# Patient Record
Sex: MALE | Race: WHITE | NOT HISPANIC OR LATINO | Employment: OTHER | URBAN - METROPOLITAN AREA
[De-identification: names, ages, dates, MRNs, and addresses within clinical notes are randomized per-mention and may not be internally consistent; named-entity substitution may affect disease eponyms.]

---

## 2017-04-28 PROBLEM — E11.3293 NON-PROLIFERATIVE DIABETIC RETINOPATHY, BOTH EYES: Status: ACTIVE | Noted: 2017-04-28

## 2017-06-07 ENCOUNTER — PATIENT OUTREACH (OUTPATIENT)
Dept: ADMINISTRATIVE | Facility: HOSPITAL | Age: 47
End: 2017-06-07

## 2017-06-12 ENCOUNTER — TELEPHONE (OUTPATIENT)
Dept: FAMILY MEDICINE | Facility: CLINIC | Age: 47
End: 2017-06-12

## 2017-06-12 DIAGNOSIS — E78.5 DYSLIPIDEMIA: ICD-10-CM

## 2017-06-12 DIAGNOSIS — I10 ESSENTIAL HYPERTENSION: Primary | ICD-10-CM

## 2017-06-12 DIAGNOSIS — Z12.5 ENCOUNTER FOR SCREENING FOR MALIGNANT NEOPLASM OF PROSTATE: ICD-10-CM

## 2017-06-12 DIAGNOSIS — E11.9 CONTROLLED TYPE 2 DIABETES MELLITUS WITHOUT COMPLICATION, WITHOUT LONG-TERM CURRENT USE OF INSULIN: ICD-10-CM

## 2017-06-12 NOTE — TELEPHONE ENCOUNTER
----- Message from Beth Noel sent at 6/12/2017  8:14 AM CDT -----  Contact: pt [phone]  Patient called and asked if you will put the labs to be done in the system.

## 2017-06-14 ENCOUNTER — LAB VISIT (OUTPATIENT)
Dept: LAB | Facility: HOSPITAL | Age: 47
End: 2017-06-14
Attending: INTERNAL MEDICINE
Payer: COMMERCIAL

## 2017-06-14 DIAGNOSIS — E78.5 DYSLIPIDEMIA: ICD-10-CM

## 2017-06-14 DIAGNOSIS — Z12.5 ENCOUNTER FOR SCREENING FOR MALIGNANT NEOPLASM OF PROSTATE: ICD-10-CM

## 2017-06-14 DIAGNOSIS — E11.9 CONTROLLED TYPE 2 DIABETES MELLITUS WITHOUT COMPLICATION, WITHOUT LONG-TERM CURRENT USE OF INSULIN: ICD-10-CM

## 2017-06-14 DIAGNOSIS — I10 ESSENTIAL HYPERTENSION: ICD-10-CM

## 2017-06-14 LAB
BASOPHILS # BLD AUTO: 0.03 K/UL
BASOPHILS NFR BLD: 0.4 %
CHOLEST/HDLC SERPL: 6.2 {RATIO}
COMPLEXED PSA SERPL-MCNC: 0.46 NG/ML
DIFFERENTIAL METHOD: ABNORMAL
EOSINOPHIL # BLD AUTO: 0.1 K/UL
EOSINOPHIL NFR BLD: 1.3 %
ERYTHROCYTE [DISTWIDTH] IN BLOOD BY AUTOMATED COUNT: 12.9 %
ESTIMATED AVG GLUCOSE: 315 MG/DL
HBA1C MFR BLD HPLC: 12.6 %
HCT VFR BLD AUTO: 51 %
HDL/CHOLESTEROL RATIO: 16 %
HDLC SERPL-MCNC: 231 MG/DL
HDLC SERPL-MCNC: 37 MG/DL
HGB BLD-MCNC: 17.7 G/DL
LDLC SERPL CALC-MCNC: 129 MG/DL
LYMPHOCYTES # BLD AUTO: 2.7 K/UL
LYMPHOCYTES NFR BLD: 32 %
MCH RBC QN AUTO: 29.4 PG
MCHC RBC AUTO-ENTMCNC: 34.7 %
MCV RBC AUTO: 85 FL
MONOCYTES # BLD AUTO: 0.6 K/UL
MONOCYTES NFR BLD: 7.3 %
NEUTROPHILS # BLD AUTO: 4.9 K/UL
NEUTROPHILS NFR BLD: 59 %
NONHDLC SERPL-MCNC: 194 MG/DL
PLATELET # BLD AUTO: 172 K/UL
PMV BLD AUTO: 13.3 FL
RBC # BLD AUTO: 6.03 M/UL
TRIGL SERPL-MCNC: 325 MG/DL
WBC # BLD AUTO: 8.34 K/UL

## 2017-06-14 PROCEDURE — 85025 COMPLETE CBC W/AUTO DIFF WBC: CPT

## 2017-06-14 PROCEDURE — 80061 LIPID PANEL: CPT

## 2017-06-14 PROCEDURE — 83036 HEMOGLOBIN GLYCOSYLATED A1C: CPT

## 2017-06-14 PROCEDURE — 36415 COLL VENOUS BLD VENIPUNCTURE: CPT | Mod: PO

## 2017-06-14 PROCEDURE — 84153 ASSAY OF PSA TOTAL: CPT

## 2017-06-21 ENCOUNTER — OFFICE VISIT (OUTPATIENT)
Dept: FAMILY MEDICINE | Facility: CLINIC | Age: 47
End: 2017-06-21
Payer: COMMERCIAL

## 2017-06-21 VITALS
WEIGHT: 289.44 LBS | SYSTOLIC BLOOD PRESSURE: 126 MMHG | OXYGEN SATURATION: 96 % | RESPIRATION RATE: 18 BRPM | BODY MASS INDEX: 37.15 KG/M2 | HEIGHT: 74 IN | DIASTOLIC BLOOD PRESSURE: 90 MMHG | HEART RATE: 85 BPM

## 2017-06-21 DIAGNOSIS — E78.5 DYSLIPIDEMIA: ICD-10-CM

## 2017-06-21 DIAGNOSIS — I10 ESSENTIAL HYPERTENSION: ICD-10-CM

## 2017-06-21 DIAGNOSIS — Z00.00 ROUTINE PHYSICAL EXAMINATION: Primary | ICD-10-CM

## 2017-06-21 PROCEDURE — 99396 PREV VISIT EST AGE 40-64: CPT | Mod: S$GLB,,, | Performed by: INTERNAL MEDICINE

## 2017-06-21 PROCEDURE — 99999 PR PBB SHADOW E&M-EST. PATIENT-LVL IV: CPT | Mod: PBBFAC,,, | Performed by: INTERNAL MEDICINE

## 2017-06-21 RX ORDER — ENALAPRIL MALEATE 10 MG/1
10 TABLET ORAL DAILY
Qty: 30 TABLET | Refills: 3 | Status: SHIPPED | OUTPATIENT
Start: 2017-06-21 | End: 2017-12-04 | Stop reason: SDUPTHER

## 2017-06-21 RX ORDER — FENOFIBRATE 54 MG/1
54 TABLET ORAL DAILY
Qty: 30 TABLET | Refills: 3 | Status: SHIPPED | OUTPATIENT
Start: 2017-06-21 | End: 2017-10-15 | Stop reason: SDUPTHER

## 2017-06-21 RX ORDER — ATORVASTATIN CALCIUM 40 MG/1
40 TABLET, FILM COATED ORAL DAILY
Qty: 30 TABLET | Refills: 3 | Status: SHIPPED | OUTPATIENT
Start: 2017-06-21 | End: 2017-11-10 | Stop reason: SDUPTHER

## 2017-06-21 RX ORDER — INSULIN LISPRO 100 [IU]/ML
INJECTION, SOLUTION INTRAVENOUS; SUBCUTANEOUS
Qty: 9 ML | Refills: 1 | Status: SHIPPED | OUTPATIENT
Start: 2017-06-21 | End: 2017-11-10 | Stop reason: SDUPTHER

## 2017-06-21 RX ORDER — INSULIN GLARGINE 100 [IU]/ML
50 INJECTION, SOLUTION SUBCUTANEOUS DAILY
Qty: 15 ML | Refills: 1 | Status: SHIPPED | OUTPATIENT
Start: 2017-06-21 | End: 2017-09-01 | Stop reason: SDUPTHER

## 2017-06-21 NOTE — PROGRESS NOTES
Subjective:       Patient ID: Dre Short is a 47 y.o. male.    Chief Complaint: Establish Care and Annual Exam    Here for routine health maintenance.  No new complaints.  Nice patient who has been extremely poorly compliant with Dr peres and medication.  He was eventually fired and now is committed to his health.  He is out of meds for weeks - months  DM - severely uncontrolled;   HTN - uncontrolled;   HLD - uncontrolled        Review of Systems   Constitutional: Negative for activity change and unexpected weight change.   HENT: Negative for hearing loss, rhinorrhea and trouble swallowing.    Eyes: Negative for discharge and visual disturbance.   Respiratory: Negative for chest tightness and wheezing.    Cardiovascular: Negative for chest pain and palpitations.   Gastrointestinal: Negative for blood in stool, constipation, diarrhea and vomiting.   Endocrine: Negative for polydipsia and polyuria.   Genitourinary: Negative for difficulty urinating, hematuria and urgency.   Musculoskeletal: Negative for arthralgias, joint swelling and neck pain.   Neurological: Negative for weakness and headaches.   Psychiatric/Behavioral: Negative for confusion and dysphoric mood.       Objective:      Vitals:    06/21/17 1424   BP: (!) 126/90   Pulse: 85   Resp: 18     Physical Exam   Constitutional: He appears well-nourished.   Eyes: Conjunctivae and EOM are normal.   Neck: Trachea normal and normal range of motion. No thyromegaly present.   Cardiovascular: Normal heart sounds.    Edema negative   Pulmonary/Chest: Effort normal and breath sounds normal.   Abdominal: Soft. There is no hepatomegaly.   Musculoskeletal:   ROM normal bilateral  Strength normal bilateral   Neurological: He has normal reflexes. No cranial nerve deficit.   Skin: Skin is warm, dry and intact.   Psychiatric: He has a normal mood and affect.   Alert and Oriented    Vitals reviewed.        Assessment:       1. Routine physical examination    2.  Uncontrolled type 2 diabetes mellitus without complication, without long-term current use of insulin    3. Essential hypertension    4. Dyslipidemia        Plan:       Routine physical examination    Uncontrolled type 2 diabetes mellitus without complication, without long-term current use of insulin  -     Ambulatory Referral to Diabetes Education  -     Hemoglobin A1c; Future; Expected date: 09/19/2017  -     insulin glargine (LANTUS SOLOSTAR) 100 unit/mL (3 mL) InPn pen; Inject 50 Units into the skin once daily. Include needles  Dispense: 15 mL; Refill: 1  -     insulin lispro (HUMALOG KWIKPEN) 100 unit/mL InPn pen; 15u sc before 2 largest meals daily plus scale below:  If the glucose is less than 150, add nothing to the 15u.   If the glucose is >151-200, add 2 unit SQ   If the glucose is 201-250, add 3 units SQ   If the glucose is 250-300, add 4 units SQ   If the glucose is 301-350, add 5 units SQ   If the glucose is 350-400, add 6 units SQ   If the glucose is >400, add 7 units SQ  Dispense: 9 mL; Refill: 1    Essential hypertension  -     Comprehensive metabolic panel; Future; Expected date: 09/19/2017  -     enalapril (VASOTEC) 10 MG tablet; Take 1 tablet (10 mg total) by mouth once daily.  Dispense: 30 tablet; Refill: 3    Dyslipidemia  -     Lipid panel; Future; Expected date: 09/19/2017  -     TSH; Future; Expected date: 09/19/2017  -     atorvastatin (LIPITOR) 40 MG tablet; Take 1 tablet (40 mg total) by mouth once daily.  Dispense: 30 tablet; Refill: 3  -     fenofibrate (TRICOR) 54 MG tablet; Take 1 tablet (54 mg total) by mouth once daily.  Dispense: 30 tablet; Refill: 3            Counseled on regular exercise, maintenance of a healthy weight, balanced diet rich in fruits/vegetables and lean protein, and avoidance of unhealthy habits like smoking and excessive alcohol intake.   Also, counseled on importance of being compliant with medication, health appointments, diet and exercise.     Return in about 3  "months (around 9/21/2017).    "This note will not be shared with the patient."  "

## 2017-06-22 ENCOUNTER — PATIENT MESSAGE (OUTPATIENT)
Dept: FAMILY MEDICINE | Facility: CLINIC | Age: 47
End: 2017-06-22

## 2017-06-27 ENCOUNTER — TELEPHONE (OUTPATIENT)
Dept: FAMILY MEDICINE | Facility: CLINIC | Age: 47
End: 2017-06-27

## 2017-06-27 NOTE — TELEPHONE ENCOUNTER
----- Message from Arlyn Banegas sent at 6/27/2017  8:39 AM CDT -----  Contact: self- [phone]  Patient called asking for an update for rx refill for rx pen needles, meter, testing strips. Thanks!

## 2017-07-13 ENCOUNTER — PATIENT MESSAGE (OUTPATIENT)
Dept: ENDOCRINOLOGY | Facility: CLINIC | Age: 47
End: 2017-07-13

## 2017-07-13 ENCOUNTER — CLINICAL SUPPORT (OUTPATIENT)
Dept: DIABETES | Facility: CLINIC | Age: 47
End: 2017-07-13
Payer: COMMERCIAL

## 2017-07-13 VITALS — WEIGHT: 291.25 LBS | HEIGHT: 74 IN | BODY MASS INDEX: 37.38 KG/M2

## 2017-07-13 PROCEDURE — 99999 PR PBB SHADOW E&M-EST. PATIENT-LVL I: CPT | Mod: PBBFAC,,,

## 2017-07-13 PROCEDURE — G0108 DIAB MANAGE TRN  PER INDIV: HCPCS | Mod: S$GLB,,, | Performed by: DIETITIAN, REGISTERED

## 2017-07-13 NOTE — PROGRESS NOTES
Patient here for DM education, referred by PCP, Dr. Peñaloza.  Pt with long history of uncontrolled diabetes (Hgb A1c > 12%).  Appt made for pt to see Tessie mills NP in Endocrinology for diabetes management.   Pt reports he weighed 423 lbs in 2010 and has lost weight gradually to current weight of 290 lbs. Has recently been fluctuating between 280-290 lbs.  Pt has a goal weight of 250 lbs.      Much of appt spent with meal planning-pt eating a high CHO diet with increased portions.  Pt willing to count CHO at meals to help balance intake--will use [employer]ink for iPhone and Android geo on phone to count CHO especially when dining at restaurants.  Pt not getting regular exercise, generally bikes in Fall & Spring with young son but in summer it is too hot.  Recommendations given to pt for aerobic exercise and stressed importance of getting 30 minutes in most days.    Pt having difficulty getting Humalog kwikpen from pharmacy.  Called [facility] in [place] today and fixed the error with the prescription so he will begin his Humalog with dinner meal tonight as prescribed by PCP.  Pt taking Lantus each evening around 9pm (50 units) and has been injecting same site.  Discussed importance of site rotation for injections and pt verbalizes understanding.       07/13/17 0000   Diabetes Education Visit   Diabetes Education Record Assessment/Progress Initial   Diabetes Type   Diabetes Type  Type II   Diabetes History   Diabetes Diagnosis 5-10 years  (Diagnosed 7 yrs ago per patient)   Nutrition   Meal Planning skipping meals;eats out often;diet drinks;snacks between meal  (Has stopped drinking regular sodas, continues to snack 2-3 times a day. Eats fast food regularly)   Meal Plan 24 Hour Recall - Breakfast Generally skips or will have pop-tart or slice of toast with butter   Meal Plan 24 Hour Recall - Lunch chicken breast, rice pilaf, baked beans, crescent roll, 2% milk   Meal Plan 24 Hour Recall - Dinner Popeyes: 2 pieces, mashed potatoes, biscuit, and  Coke Zero   Meal Plan 24 Hour Recall - Snack Cane's chicken finger (1) with slice tex[place] toast (mid afternoon) AND vanilla wafers/harpreet crackers (evening)   Monitoring    Monitoring Free Lite   Self Monitoring  Yes has been checking 2 times daily, once before meals in morning and right after eating   Blood Glucose Logs No  (Pt self reports glucose levels since starting Lantus 3 weeks ago running 215-220 mg/dL in morning before breakfast)   Exercise    Exercise Type bike riding  (Bike rides with son in Fall and Spring, does not exercise when too hot)   Intensity Moderate   Frequency 3-5 Times per week  (Only in Fall and Spring season)   Current Diabetes Treatment    Current Treatment Insulin  (Lantus 50 units QHS, prescribed Humalog but having issues getting prescription filled)   Social History   Preferred Learning Method Face to Face   Primary Support Self   Alcohol Use Never   Barriers to Change   Barriers to Change None   Learning Challenges  None   Readiness to Learn    Readiness to Learn  Acceptance   Cultural Influences   Cultural Influences None   Diabetes Education Assessment/Progress   Acute Complications (preventing, detecting, and treating acute complications) DC  (Discussed signs and symptoms of hypoglycemia (< 70 mg/dL) and proper methods to treat if hypoglycemia occurs)   Chronic Complications (preventing, detecting, and treating chronic complications) DC  (Long discussion on long term complications of uncontrolled DM--A1c has been > 12 since 2014)   Diabetes Disease Process (diabetes disease process and treatment options) DC  (Discussed goal Hgb A1c levels and goal pre meal glucose levels (< 150 mg/dL))   Nutrition (Incorporating nutritional management into one's lifestyle) DC;W  (Reviewed foods that contain CHO; Practiced reading food labels for CHO content.  Pt given a CHO budget for meals and snacks.  Written booklet provided for CHO counting & pt will use [place] geo on his phone.  Discussed  balancing CHO throughout day)   Physical Activity (incorporating physical activity into one's lifestyle) DC  (Encouraged to increase frequency of exercise, pt has a pool at home.  Goal to get 30 minutes of aerobic exercisem ost days of the week for both weight loss efforts and better glucose management)   Medications (states correct name, dose, onset, peak, duration, side effects & timing of meds) DC  (Discussed proper timing of Lantus, pt compliant.  Pt states he gives injection in same site each time--discussed importance of alternating injection sites. Called in Humalog kwikpen ordered by Dr. Peñaloza into pharmacy, to begin Humalog tonight with dinner)   Monitoring (monitoring blood glucose/other parameters & using results) DC  (Encouraged to bring glucose log to August 2017 Endo appt, pt given written glucose log to complete.  He will be checking glucose 2-3 times daily.  )   Goals   Healthy Eating Set   Start Date 07/14/17  (Will decrease portions of CHO and try to limit CHO at meals)   Physical Activity In Progress  (Will increase frequency)   Monitoring In Progress   Medications In Progress  (Compliant with Lantus, will start Humalog at lunch & dinner)   Diabetes Care Plan/Intervention   Education Plan/Intervention Endocrine Provider Visit Set Up  (Due to long history of elevated Hgb A1c, made appt with Endocrine provider on 8/4/17, pt will call or email me with questions/concerns or if additional DM education desired)   Diabetes Meal Plan   Carbohydrate Per Meal 60-75g   Carbohydrate Per Snack  15-20g   Education Units of Time    Time Spent 45 min

## 2017-08-31 ENCOUNTER — PATIENT MESSAGE (OUTPATIENT)
Dept: ADMINISTRATIVE | Facility: OTHER | Age: 47
End: 2017-08-31

## 2017-09-01 RX ORDER — INSULIN GLARGINE 100 [IU]/ML
50 INJECTION, SOLUTION SUBCUTANEOUS DAILY
Qty: 15 ML | Refills: 1 | Status: SHIPPED | OUTPATIENT
Start: 2017-09-01 | End: 2017-11-08 | Stop reason: SDUPTHER

## 2017-09-14 ENCOUNTER — PATIENT OUTREACH (OUTPATIENT)
Dept: ADMINISTRATIVE | Facility: HOSPITAL | Age: 47
End: 2017-09-14

## 2017-10-12 ENCOUNTER — TELEPHONE (OUTPATIENT)
Dept: FAMILY MEDICINE | Facility: CLINIC | Age: 47
End: 2017-10-12

## 2017-10-12 NOTE — TELEPHONE ENCOUNTER
----- Message from Mike Morse sent at 10/12/2017  2:28 PM CDT -----  Contact: Patient  Patient is calling to see about getting an appointment the last week of October or the first week of November for a 3 week follow-up.   Call Back #[phone]  Thanks

## 2017-10-15 DIAGNOSIS — E78.5 DYSLIPIDEMIA: ICD-10-CM

## 2017-10-16 RX ORDER — FENOFIBRATE 54 MG/1
54 TABLET ORAL DAILY
Qty: 90 TABLET | Refills: 0 | Status: SHIPPED | OUTPATIENT
Start: 2017-10-16 | End: 2017-11-10 | Stop reason: SDUPTHER

## 2017-10-16 NOTE — PROGRESS NOTES
Refill Authorization Note     is requesting a refill authorization.    Brief assessment and rational for refill: APPROVE: prr  Name of medication: FENOFIBRATE 54MG TABLETS  How patient will take medication: t1t po daily   Amount/Quantity of medication ordered: 90d   Medication reconciliation completed: No        Refills Authorized: Yes  If authorized number of refills: 0        Medication Therapy Plan: New start at last visit.  Needs new labs to assess efficacy.  Needs to draw labs before appointment with NP.  Approve 90d   Comments:   Lab Results   Component Value Date    CHOL 231 (H) 06/14/2017    CHOL 212 (H) 04/18/2016    CHOL 143 03/14/2014     Lab Results   Component Value Date    HDL 37 (L) 06/14/2017    HDL 35 (L) 04/18/2016    HDL 35 (L) 03/14/2014     Lab Results   Component Value Date    LDLCALC 129.0 06/14/2017    LDLCALC 125.6 04/18/2016    LDLCALC 80.6 03/14/2014     Lab Results   Component Value Date    TRIG 325 (H) 06/14/2017    TRIG 257 (H) 04/18/2016    TRIG 137 03/14/2014     Lab Results   Component Value Date    CHOLHDL 16.0 (L) 06/14/2017    CHOLHDL 16.5 (L) 04/18/2016    CHOLHDL 24.5 03/14/2014      Lab Results   Component Value Date    CREATININE 1.1 04/18/2016    BUN 12 04/18/2016     04/18/2016    K 4.5 04/18/2016     04/18/2016    CO2 26 04/18/2016

## 2017-10-27 ENCOUNTER — PATIENT OUTREACH (OUTPATIENT)
Dept: ADMINISTRATIVE | Facility: HOSPITAL | Age: 47
End: 2017-10-27

## 2017-11-03 ENCOUNTER — LAB VISIT (OUTPATIENT)
Dept: LAB | Facility: HOSPITAL | Age: 47
End: 2017-11-03
Attending: INTERNAL MEDICINE
Payer: COMMERCIAL

## 2017-11-03 DIAGNOSIS — I10 ESSENTIAL HYPERTENSION: ICD-10-CM

## 2017-11-03 DIAGNOSIS — E78.5 DYSLIPIDEMIA: ICD-10-CM

## 2017-11-03 LAB
ALBUMIN SERPL BCP-MCNC: 3.4 G/DL
ALP SERPL-CCNC: 93 U/L
ALT SERPL W/O P-5'-P-CCNC: 21 U/L
ANION GAP SERPL CALC-SCNC: 8 MMOL/L
AST SERPL-CCNC: 22 U/L
BILIRUB SERPL-MCNC: 0.8 MG/DL
BUN SERPL-MCNC: 11 MG/DL
CALCIUM SERPL-MCNC: 9.4 MG/DL
CHLORIDE SERPL-SCNC: 104 MMOL/L
CHOLEST SERPL-MCNC: 138 MG/DL
CHOLEST/HDLC SERPL: 3.9 {RATIO}
CO2 SERPL-SCNC: 29 MMOL/L
CREAT SERPL-MCNC: 1 MG/DL
EST. GFR  (AFRICAN AMERICAN): >60 ML/MIN/1.73 M^2
EST. GFR  (NON AFRICAN AMERICAN): >60 ML/MIN/1.73 M^2
ESTIMATED AVG GLUCOSE: 260 MG/DL
GLUCOSE SERPL-MCNC: 266 MG/DL
HBA1C MFR BLD HPLC: 10.7 %
HDLC SERPL-MCNC: 35 MG/DL
HDLC SERPL: 25.4 %
LDLC SERPL CALC-MCNC: 79.4 MG/DL
NONHDLC SERPL-MCNC: 103 MG/DL
POTASSIUM SERPL-SCNC: 5.2 MMOL/L
PROT SERPL-MCNC: 7.4 G/DL
SODIUM SERPL-SCNC: 141 MMOL/L
TRIGL SERPL-MCNC: 118 MG/DL
TSH SERPL DL<=0.005 MIU/L-ACNC: 1.66 UIU/ML

## 2017-11-03 PROCEDURE — 83036 HEMOGLOBIN GLYCOSYLATED A1C: CPT

## 2017-11-03 PROCEDURE — 36415 COLL VENOUS BLD VENIPUNCTURE: CPT | Mod: PO

## 2017-11-03 PROCEDURE — 80053 COMPREHEN METABOLIC PANEL: CPT

## 2017-11-03 PROCEDURE — 80061 LIPID PANEL: CPT

## 2017-11-03 PROCEDURE — 84443 ASSAY THYROID STIM HORMONE: CPT

## 2017-11-08 RX ORDER — INSULIN GLARGINE 100 [IU]/ML
INJECTION, SOLUTION SUBCUTANEOUS
Qty: 15 ML | Refills: 0 | Status: SHIPPED | OUTPATIENT
Start: 2017-11-08 | End: 2017-11-10 | Stop reason: SDUPTHER

## 2017-11-10 ENCOUNTER — LAB VISIT (OUTPATIENT)
Dept: LAB | Facility: HOSPITAL | Age: 47
End: 2017-11-10
Attending: NURSE PRACTITIONER
Payer: COMMERCIAL

## 2017-11-10 ENCOUNTER — OFFICE VISIT (OUTPATIENT)
Dept: FAMILY MEDICINE | Facility: CLINIC | Age: 47
End: 2017-11-10
Payer: COMMERCIAL

## 2017-11-10 VITALS
RESPIRATION RATE: 18 BRPM | DIASTOLIC BLOOD PRESSURE: 80 MMHG | BODY MASS INDEX: 38.73 KG/M2 | SYSTOLIC BLOOD PRESSURE: 134 MMHG | OXYGEN SATURATION: 99 % | HEART RATE: 74 BPM | TEMPERATURE: 99 F | WEIGHT: 301.81 LBS | HEIGHT: 74 IN

## 2017-11-10 DIAGNOSIS — E11.69 COMBINED HYPERLIPIDEMIA ASSOCIATED WITH TYPE 2 DIABETES MELLITUS: ICD-10-CM

## 2017-11-10 DIAGNOSIS — Z23 NEED FOR INFLUENZA VACCINATION: ICD-10-CM

## 2017-11-10 DIAGNOSIS — E78.2 COMBINED HYPERLIPIDEMIA ASSOCIATED WITH TYPE 2 DIABETES MELLITUS: ICD-10-CM

## 2017-11-10 DIAGNOSIS — E87.5 HYPERKALEMIA: ICD-10-CM

## 2017-11-10 DIAGNOSIS — I15.2 HYPERTENSION ASSOCIATED WITH DIABETES: ICD-10-CM

## 2017-11-10 DIAGNOSIS — E78.5 DYSLIPIDEMIA: ICD-10-CM

## 2017-11-10 DIAGNOSIS — E11.59 HYPERTENSION ASSOCIATED WITH DIABETES: ICD-10-CM

## 2017-11-10 LAB — POTASSIUM SERPL-SCNC: 4.8 MMOL/L

## 2017-11-10 PROCEDURE — 36415 COLL VENOUS BLD VENIPUNCTURE: CPT | Mod: PO

## 2017-11-10 PROCEDURE — 99999 PR PBB SHADOW E&M-EST. PATIENT-LVL IV: CPT | Mod: PBBFAC,,, | Performed by: NURSE PRACTITIONER

## 2017-11-10 PROCEDURE — 84132 ASSAY OF SERUM POTASSIUM: CPT

## 2017-11-10 PROCEDURE — 90471 IMMUNIZATION ADMIN: CPT | Mod: S$GLB,,, | Performed by: FAMILY MEDICINE

## 2017-11-10 PROCEDURE — 99214 OFFICE O/P EST MOD 30 MIN: CPT | Mod: 25,S$GLB,, | Performed by: NURSE PRACTITIONER

## 2017-11-10 PROCEDURE — 90686 IIV4 VACC NO PRSV 0.5 ML IM: CPT | Mod: S$GLB,,, | Performed by: FAMILY MEDICINE

## 2017-11-10 RX ORDER — CIPROFLOXACIN 500 MG/1
500 TABLET ORAL EVERY 12 HOURS
Qty: 14 TABLET | Refills: 0 | Status: SHIPPED | OUTPATIENT
Start: 2017-11-10 | End: 2017-11-17

## 2017-11-10 RX ORDER — PEN NEEDLE, DIABETIC 29 G X1/2"
NEEDLE, DISPOSABLE MISCELLANEOUS
Qty: 100 EACH | Refills: 11 | Status: SHIPPED | OUTPATIENT
Start: 2017-11-10 | End: 2018-06-28 | Stop reason: SDUPTHER

## 2017-11-10 RX ORDER — INSULIN LISPRO 100 [IU]/ML
INJECTION, SOLUTION INTRAVENOUS; SUBCUTANEOUS
Qty: 9 ML | Refills: 3 | OUTPATIENT
Start: 2017-11-10 | End: 2018-06-28 | Stop reason: SDUPTHER

## 2017-11-10 RX ORDER — ATORVASTATIN CALCIUM 40 MG/1
40 TABLET, FILM COATED ORAL DAILY
Qty: 90 TABLET | Refills: 1 | Status: SHIPPED | OUTPATIENT
Start: 2017-11-10 | End: 2018-02-14 | Stop reason: SDUPTHER

## 2017-11-10 RX ORDER — INSULIN GLARGINE 100 [IU]/ML
INJECTION, SOLUTION SUBCUTANEOUS
Qty: 15 ML | Refills: 3 | Status: SHIPPED | OUTPATIENT
Start: 2017-11-10 | End: 2018-05-15 | Stop reason: SDUPTHER

## 2017-11-10 RX ORDER — FENOFIBRATE 54 MG/1
54 TABLET ORAL DAILY
Qty: 90 TABLET | Refills: 1 | Status: SHIPPED | OUTPATIENT
Start: 2017-11-10 | End: 2018-11-02 | Stop reason: SDUPTHER

## 2017-11-10 NOTE — PROGRESS NOTES
Subjective:       Patient ID: Dre Short is a 47 y.o. male.    Chief Complaint: Diabetes    HPI   Mr. Short is a new patient to me. He presents today to review lab results.     T2DM: uncontrolled; Hemoglobin A1C improved from 12.6 to 10.7 in the past 4 months. Last saw PCP on 6/21/17, patient had been noncompliant with meds prior to then. Started on Lantus 50 units daily and Humalog 15 units before 2 largest meals of the day plus SSI. Saw diabetic education on 7/13/17--no follow up. Was referred to endocrinology but cancelled 2 appointments. He reports he didn't get humalog until at least 1-2 months after prescribed due to pharmacy confusion. He reports he is feeling much better--only taking Humalog about 60% of time due to being away from home for meals. Isnt eating breakfast so taking Humalog with lunch and dinner. No hypoglycemic episodes.    HTN: controlled on enalapril 10mg daily; recent mild elevation in K--will recheck today    HLD: controlled on lipitor and fenofibrate  Vitals:    11/10/17 0747   BP: 134/80   Pulse: 74   Resp: 18   Temp: 98.5 °F (36.9 °C)     Review of Systems   Constitutional: Negative.  Negative for diaphoresis and fever.   HENT: Negative.  Negative for facial swelling and trouble swallowing.    Eyes: Negative.  Negative for discharge and redness.   Respiratory: Negative.  Negative for cough and shortness of breath.    Cardiovascular: Negative.  Negative for chest pain and palpitations.   Gastrointestinal: Negative.  Negative for abdominal pain, constipation and diarrhea.   Genitourinary: Negative.  Negative for difficulty urinating and flank pain.   Musculoskeletal: Negative.  Negative for back pain and neck pain.   Skin: Negative.  Negative for rash and wound.   Neurological: Negative.  Negative for dizziness and light-headedness.   Hematological: Negative.    Psychiatric/Behavioral: Negative.  Negative for confusion. The patient is not nervous/anxious.        Past Medical  History:   Diagnosis Date    Colon polyps 7/3/2012    Combined hyperlipidemia associated with type 2 diabetes mellitus     Diabetes mellitus type II, uncontrolled     Diverticulitis of colon 7/3/2012    Fatty liver     Hyperlipidemia     Hypertension associated with diabetes     Sleep apnea     compliant with CPAP     Objective:      Physical Exam   Constitutional: He is oriented to person, place, and time. He does not have a sickly appearance. No distress.   HENT:   Head: Normocephalic.   Right Ear: Hearing normal.   Left Ear: Hearing normal.   Nose: Nose normal.   Eyes: Conjunctivae and lids are normal.   Neck: Trachea normal. No JVD present.   Cardiovascular: Normal rate, regular rhythm, S1 normal, S2 normal and normal heart sounds.    Pulses:       Dorsalis pedis pulses are 2+ on the right side, and 2+ on the left side.        Posterior tibial pulses are 2+ on the right side, and 2+ on the left side.   Pulmonary/Chest: Effort normal and breath sounds normal. He exhibits no tenderness.   Abdominal: Normal appearance. He exhibits no distension.   Musculoskeletal: Normal range of motion. He exhibits no edema or deformity.        Right foot: There is normal range of motion and no deformity.        Left foot: There is normal range of motion and no deformity.   Feet:   Right Foot:   Protective Sensation: 10 sites tested. 10 sites sensed.   Skin Integrity: Negative for ulcer, blister, skin breakdown, erythema, warmth, callus or dry skin.   Left Foot:   Protective Sensation: 10 sites tested. 10 sites sensed.   Skin Integrity: Negative for ulcer, blister, skin breakdown, erythema, warmth, callus or dry skin.   Neurological: He is alert and oriented to person, place, and time.   Skin: Skin is warm and dry. He is not diaphoretic. No pallor.   Psychiatric: He has a normal mood and affect. His speech is normal and behavior is normal. Judgment and thought content normal. Cognition and memory are normal.   Nursing note  and vitals reviewed.      Assessment:       1. Uncontrolled type 2 diabetes mellitus with other ophthalmic complication, without long-term current use of insulin    2. Combined hyperlipidemia associated with type 2 diabetes mellitus    3. Hypertension associated with diabetes    4. Need for influenza vaccination    5. BMI 38.0-38.9,adult    6. Uncontrolled type 2 diabetes mellitus with other ophthalmic complication, with long-term current use of insulin    7. Hyperkalemia        Plan:     Dre was seen today for diabetes.    Diagnoses and all orders for this visit:    Uncontrolled type 2 diabetes mellitus with other ophthalmic complication, without long-term current use of insulin  -     Ambulatory referral to Endocrinology  -     Discontinue: liraglutide 0.6 mg/0.1 mL, 18 mg/3 mL, subq PNIJ 0.6 mg/0.1 mL (18 mg/3 mL) PnIj; Inject 0.6 mg into the skin once daily.  -     Comprehensive metabolic panel; Future  -     Hemoglobin A1c; Future  -     liraglutide 0.6 mg/0.1 mL, 18 mg/3 mL, subq PNIJ 0.6 mg/0.1 mL (18 mg/3 mL) PnIj; Inject 0.6 mg into the skin once daily. Increase to 1.2 mg SC daily after 1 week  -     insulin glargine (LANTUS SOLOSTAR) 100 unit/mL (3 mL) InPn pen; INJECT 50 UNITS INTO THE SKIN ONCE DAILY  -     insulin lispro (HUMALOG KWIKPEN) 100 unit/mL InPn pen; 15u sc before 2 largest meals daily plus scale below:  If the glucose is less than 150, add nothing to the 15u.   If the glucose is >151-200, add 2 unit SQ   If the glucose is 201-250, add 3 units SQ   If the glucose is 250-300, add 4 units SQ   If the glucose is 301-350, add 5 units SQ   If the glucose is 350-400, add 6 units SQ   If the glucose is >400, add 7 units SQ  - Start eating breakfast--diet education; try taking Humalog with breakfast and dinner to increase compliance with humalog       Combined hyperlipidemia associated with type 2 diabetes mellitus  -     fenofibrate (TRICOR) 54 MG tablet; Take 1 tablet (54 mg total) by mouth once  "daily. Needs to draw new labs  -     atorvastatin (LIPITOR) 40 MG tablet; Take 1 tablet (40 mg total) by mouth once daily.    Hypertension associated with diabetes    Need for influenza vaccination    BMI 38.0-38.9,adult  -     Discontinue: liraglutide 0.6 mg/0.1 mL, 18 mg/3 mL, subq PNIJ 0.6 mg/0.1 mL (18 mg/3 mL) PnIj; Inject 0.6 mg into the skin once daily.  -     liraglutide 0.6 mg/0.1 mL, 18 mg/3 mL, subq PNIJ 0.6 mg/0.1 mL (18 mg/3 mL) PnIj; Inject 0.6 mg into the skin once daily. Increase to 1.2 mg SC daily after 1 week    Uncontrolled type 2 diabetes mellitus with other ophthalmic complication, with long-term current use of insulin  -     pen needle, diabetic (BD INSULIN PEN NEEDLE UF ORIG) 29 gauge x 1/2" Ndle; USE ONCE DAILY AS DIRECTED    Hyperkalemia  -     Potassium; Future    Dyslipidemia  -     fenofibrate (TRICOR) 54 MG tablet; Take 1 tablet (54 mg total) by mouth once daily. Needs to draw new labs  -     atorvastatin (LIPITOR) 40 MG tablet; Take 1 tablet (40 mg total) by mouth once daily.    Uncontrolled type 2 diabetes mellitus without complication, without long-term current use of insulin  -     insulin glargine (LANTUS SOLOSTAR) 100 unit/mL (3 mL) InPn pen; INJECT 50 UNITS INTO THE SKIN ONCE DAILY  -     insulin lispro (HUMALOG KWIKPEN) 100 unit/mL InPn pen; 15u sc before 2 largest meals daily plus scale below:  If the glucose is less than 150, add nothing to the 15u.   If the glucose is >151-200, add 2 unit SQ   If the glucose is 201-250, add 3 units SQ   If the glucose is 250-300, add 4 units SQ   If the glucose is 301-350, add 5 units SQ   If the glucose is 350-400, add 6 units SQ   If the glucose is >400, add 7 units SQ    Other orders  -     ciprofloxacin HCl (CIPRO) 500 MG tablet; Take 1 tablet (500 mg total) by mouth every 12 (twelve) hours.      Follow up with Endo in January as scheduled  3 months with PCP  With me PRN  "

## 2017-11-13 ENCOUNTER — PATIENT MESSAGE (OUTPATIENT)
Dept: FAMILY MEDICINE | Facility: CLINIC | Age: 47
End: 2017-11-13

## 2017-11-21 ENCOUNTER — TELEPHONE (OUTPATIENT)
Dept: FAMILY MEDICINE | Facility: CLINIC | Age: 47
End: 2017-11-21

## 2017-11-22 NOTE — TELEPHONE ENCOUNTER
Per CoverMyMeds:    The plan will fax you a determination, typically within 1 to 5 business days.

## 2017-11-24 ENCOUNTER — TELEPHONE (OUTPATIENT)
Dept: FAMILY MEDICINE | Facility: CLINIC | Age: 47
End: 2017-11-24

## 2017-12-04 DIAGNOSIS — I10 ESSENTIAL HYPERTENSION: ICD-10-CM

## 2017-12-05 RX ORDER — ENALAPRIL MALEATE 10 MG/1
TABLET ORAL
Qty: 30 TABLET | Refills: 0 | Status: SHIPPED | OUTPATIENT
Start: 2017-12-05 | End: 2018-01-25 | Stop reason: SDUPTHER

## 2017-12-05 NOTE — TELEPHONE ENCOUNTER
Needs an appointment with a provider; preferably NP Yoanna - next available.   Labs right before next appointment.

## 2017-12-21 ENCOUNTER — TELEPHONE (OUTPATIENT)
Dept: FAMILY MEDICINE | Facility: CLINIC | Age: 47
End: 2017-12-21

## 2017-12-21 NOTE — TELEPHONE ENCOUNTER
----- Message from Ileana Castro sent at 12/21/2017  9:06 AM CST -----  Contact: Tawnya Deja   Wife called regarding a refill of medication. Only has 2 days left. Please contact [phone] (home)     insulin glargine (LANTUS SOLOSTAR) 100 unit/mL (3 mL) [facility] [address] [place] [address] AXEL TAVERAS  Annie EGAN AT [facility] [place] [address]  Phone: [phone] Fax: [phone]

## 2017-12-21 NOTE — TELEPHONE ENCOUNTER
Spoke with pharmacy advised pt still have 4 refills ledt of Rx. Spoke with patient advised him of refills. Patient verbalized understanding

## 2018-01-25 DIAGNOSIS — I10 ESSENTIAL HYPERTENSION: ICD-10-CM

## 2018-01-26 RX ORDER — ENALAPRIL MALEATE 10 MG/1
TABLET ORAL
Qty: 30 TABLET | Refills: 0 | Status: SHIPPED | OUTPATIENT
Start: 2018-01-26 | End: 2018-06-27 | Stop reason: SDUPTHER

## 2018-02-14 ENCOUNTER — PATIENT MESSAGE (OUTPATIENT)
Dept: FAMILY MEDICINE | Facility: CLINIC | Age: 48
End: 2018-02-14

## 2018-02-14 DIAGNOSIS — E78.5 DYSLIPIDEMIA: ICD-10-CM

## 2018-02-14 DIAGNOSIS — E11.69 COMBINED HYPERLIPIDEMIA ASSOCIATED WITH TYPE 2 DIABETES MELLITUS: ICD-10-CM

## 2018-02-14 DIAGNOSIS — E78.2 COMBINED HYPERLIPIDEMIA ASSOCIATED WITH TYPE 2 DIABETES MELLITUS: ICD-10-CM

## 2018-02-14 RX ORDER — ATORVASTATIN CALCIUM 40 MG/1
TABLET, FILM COATED ORAL
Qty: 90 TABLET | Refills: 1 | Status: SHIPPED | OUTPATIENT
Start: 2018-02-14 | End: 2018-06-28 | Stop reason: SDUPTHER

## 2018-05-07 ENCOUNTER — PATIENT MESSAGE (OUTPATIENT)
Dept: FAMILY MEDICINE | Facility: CLINIC | Age: 48
End: 2018-05-07

## 2018-05-09 ENCOUNTER — PATIENT OUTREACH (OUTPATIENT)
Dept: ADMINISTRATIVE | Facility: HOSPITAL | Age: 48
End: 2018-05-09

## 2018-05-09 NOTE — PROGRESS NOTES
Eye exam performed by [facility]  in media tab. HM and care team updated.     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

## 2018-05-11 LAB
ALBUMIN SERPL-MCNC: 4.1 G/DL (ref 3.6–5.1)
ALBUMIN/GLOB SERPL: 1.3 (CALC) (ref 1–2.5)
ALP SERPL-CCNC: 75 U/L (ref 40–115)
ALT SERPL-CCNC: 30 U/L (ref 9–46)
AST SERPL-CCNC: 26 U/L (ref 10–40)
BILIRUB SERPL-MCNC: 0.7 MG/DL (ref 0.2–1.2)
BUN SERPL-MCNC: 10 MG/DL (ref 7–25)
BUN/CREAT SERPL: ABNORMAL (CALC) (ref 6–22)
CALCIUM SERPL-MCNC: 9.4 MG/DL (ref 8.6–10.3)
CHLORIDE SERPL-SCNC: 107 MMOL/L (ref 98–110)
CO2 SERPL-SCNC: 30 MMOL/L (ref 20–31)
CREAT SERPL-MCNC: 0.89 MG/DL (ref 0.6–1.35)
GFR SERPL CREATININE-BSD FRML MDRD: 100 ML/MIN/1.73M2
GLOBULIN SER CALC-MCNC: 3.2 G/DL (CALC) (ref 1.9–3.7)
GLUCOSE SERPL-MCNC: 165 MG/DL (ref 65–99)
POTASSIUM SERPL-SCNC: 4.9 MMOL/L (ref 3.5–5.3)
PROT SERPL-MCNC: 7.3 G/DL (ref 6.1–8.1)
SODIUM SERPL-SCNC: 142 MMOL/L (ref 135–146)

## 2018-05-14 ENCOUNTER — TELEPHONE (OUTPATIENT)
Dept: FAMILY MEDICINE | Facility: CLINIC | Age: 48
End: 2018-05-14

## 2018-05-14 ENCOUNTER — PATIENT MESSAGE (OUTPATIENT)
Dept: FAMILY MEDICINE | Facility: CLINIC | Age: 48
End: 2018-05-14

## 2018-05-15 RX ORDER — INSULIN GLARGINE 100 [IU]/ML
INJECTION, SOLUTION SUBCUTANEOUS
Qty: 15 ML | Refills: 3 | Status: SHIPPED | OUTPATIENT
Start: 2018-05-15 | End: 2018-06-28 | Stop reason: SDUPTHER

## 2018-05-15 NOTE — TELEPHONE ENCOUNTER
----- Message from Laura Barros sent at 5/15/2018  1:15 PM CDT -----  Contact: Dre  Calling to speak with nurse regarding [place] message. Please call him back on [phone]. thanks

## 2018-05-15 NOTE — TELEPHONE ENCOUNTER
Called and spoke with patient he states the last time he had his labs done here at [facility] he was billed he's asking if the orders can be sent to [facility] in [place] he's also asking for a refill on medication lantus he states he will be out before his upcoming appointment please advise

## 2018-05-16 ENCOUNTER — PATIENT MESSAGE (OUTPATIENT)
Dept: FAMILY MEDICINE | Facility: CLINIC | Age: 48
End: 2018-05-16

## 2018-06-27 ENCOUNTER — PATIENT MESSAGE (OUTPATIENT)
Dept: FAMILY MEDICINE | Facility: CLINIC | Age: 48
End: 2018-06-27

## 2018-06-27 ENCOUNTER — OFFICE VISIT (OUTPATIENT)
Dept: FAMILY MEDICINE | Facility: CLINIC | Age: 48
End: 2018-06-27
Payer: COMMERCIAL

## 2018-06-27 VITALS
HEART RATE: 92 BPM | BODY MASS INDEX: 40.43 KG/M2 | HEIGHT: 74 IN | TEMPERATURE: 98 F | RESPIRATION RATE: 16 BRPM | WEIGHT: 315 LBS | SYSTOLIC BLOOD PRESSURE: 144 MMHG | OXYGEN SATURATION: 99 % | DIASTOLIC BLOOD PRESSURE: 92 MMHG

## 2018-06-27 DIAGNOSIS — Z79.4 UNCONTROLLED TYPE 2 DIABETES MELLITUS WITH HYPERGLYCEMIA, WITH LONG-TERM CURRENT USE OF INSULIN: Primary | ICD-10-CM

## 2018-06-27 DIAGNOSIS — I10 ESSENTIAL HYPERTENSION: ICD-10-CM

## 2018-06-27 DIAGNOSIS — E66.01 MORBID OBESITY: ICD-10-CM

## 2018-06-27 DIAGNOSIS — M10.9 GOUT, UNSPECIFIED CAUSE, UNSPECIFIED CHRONICITY, UNSPECIFIED SITE: ICD-10-CM

## 2018-06-27 DIAGNOSIS — E11.65 UNCONTROLLED TYPE 2 DIABETES MELLITUS WITH HYPERGLYCEMIA, WITH LONG-TERM CURRENT USE OF INSULIN: Primary | ICD-10-CM

## 2018-06-27 LAB — HBA1C MFR BLD: 9.1 % OF TOTAL HGB

## 2018-06-27 PROCEDURE — 3046F HEMOGLOBIN A1C LEVEL >9.0%: CPT | Mod: CPTII,S$GLB,, | Performed by: NURSE PRACTITIONER

## 2018-06-27 PROCEDURE — 3008F BODY MASS INDEX DOCD: CPT | Mod: CPTII,S$GLB,, | Performed by: NURSE PRACTITIONER

## 2018-06-27 PROCEDURE — 3080F DIAST BP >= 90 MM HG: CPT | Mod: CPTII,S$GLB,, | Performed by: NURSE PRACTITIONER

## 2018-06-27 PROCEDURE — 3077F SYST BP >= 140 MM HG: CPT | Mod: CPTII,S$GLB,, | Performed by: NURSE PRACTITIONER

## 2018-06-27 PROCEDURE — 99214 OFFICE O/P EST MOD 30 MIN: CPT | Mod: S$GLB,,, | Performed by: NURSE PRACTITIONER

## 2018-06-27 PROCEDURE — 99999 PR PBB SHADOW E&M-EST. PATIENT-LVL IV: CPT | Mod: PBBFAC,,, | Performed by: NURSE PRACTITIONER

## 2018-06-27 RX ORDER — ENALAPRIL MALEATE 10 MG/1
TABLET ORAL
Qty: 90 TABLET | Refills: 1 | Status: SHIPPED | OUTPATIENT
Start: 2018-06-27 | End: 2018-12-30 | Stop reason: SDUPTHER

## 2018-06-27 RX ORDER — METFORMIN HYDROCHLORIDE 500 MG/1
500 TABLET ORAL 2 TIMES DAILY WITH MEALS
Qty: 180 TABLET | Refills: 3 | Status: SHIPPED | OUTPATIENT
Start: 2018-06-27 | End: 2018-06-28

## 2018-06-27 NOTE — PATIENT INSTRUCTIONS

## 2018-06-27 NOTE — PROGRESS NOTES
Subjective:       Patient ID: Dre Short is a 48 y.o. male.    Chief Complaint: Results    Mr. Short is a known patient to me. He presents today for diabetes follow up    HPI   T2DM: uncontrolled. I first and last saw him in November of 2017 and stressed importance of following up with endocrinology for specialist management. He cancelled multiple appointments. Was on oral meds at one point then switched to lantus, humalog and victoza? No orals currently. Has gained 26lbs in 6 months.     HTN: uncontrolled, has been out of enalapril since January 2018. Never requested refill.     History of noncompliance with medications and follow ups   Vitals:    06/27/18 0738   BP: (!) 144/92   Pulse: 92   Resp: 16   Temp: 98.3 °F (36.8 °C)     Review of Systems   Constitutional: Negative for diaphoresis, fatigue and fever.   HENT: Negative for facial swelling and trouble swallowing.    Eyes: Negative for discharge and redness.   Respiratory: Negative for cough and shortness of breath.    Cardiovascular: Negative for chest pain and palpitations.   Gastrointestinal: Negative for abdominal pain and diarrhea.   Endocrine: Negative for polydipsia, polyphagia and polyuria.   Genitourinary: Negative for difficulty urinating.   Musculoskeletal: Negative for gait problem.   Skin: Negative for pallor, rash and wound.   Neurological: Negative for dizziness, tremors, seizures, facial asymmetry, speech difficulty, weakness and headaches.   Psychiatric/Behavioral: Negative for confusion. The patient is not nervous/anxious.        Past Medical History:   Diagnosis Date    Colon polyps 7/3/2012    Combined hyperlipidemia associated with type 2 diabetes mellitus     Diabetes mellitus type II, uncontrolled     Diverticulitis of colon 7/3/2012    Fatty liver     Hyperlipidemia     Hypertension associated with diabetes     Sleep apnea     compliant with CPAP     Objective:      Physical Exam   Constitutional: He is oriented to  person, place, and time. He does not have a sickly appearance. No distress.   HENT:   Head: Normocephalic.   Right Ear: Hearing normal.   Left Ear: Hearing normal.   Nose: Nose normal.   Eyes: Conjunctivae and lids are normal.   Neck: No JVD present. No tracheal deviation present.   Cardiovascular: Normal rate and normal heart sounds.    Pulmonary/Chest: Effort normal and breath sounds normal.   Abdominal: Normal appearance. He exhibits no distension.   Musculoskeletal: Normal range of motion. He exhibits no deformity.   Neurological: He is alert and oriented to person, place, and time.   Skin: He is not diaphoretic. No pallor.   Psychiatric: He has a normal mood and affect. His speech is normal and behavior is normal. Judgment and thought content normal. Cognition and memory are normal.   Nursing note and vitals reviewed.      Assessment:       1. Uncontrolled type 2 diabetes mellitus with hyperglycemia, with long-term current use of insulin    2. Morbid obesity    3. Essential hypertension    4. Gout, unspecified cause, unspecified chronicity, unspecified site        Plan:       Uncontrolled type 2 diabetes mellitus with hyperglycemia, with long-term current use of insulin  -     metFORMIN (GLUCOPHAGE) 500 MG tablet; Take 1 tablet (500 mg total) by mouth 2 (two) times daily with meals.  Dispense: 180 tablet; Refill: 3  - Restart orals; request specialist evaluation for management of further orals and backing off insulin   - Start keeping log--AC/HS    Morbid obesity    Essential hypertension  -     enalapril (VASOTEC) 10 MG tablet; TAKE 1 TABLET(10 MG) BY MOUTH EVERY DAY  Dispense: 90 tablet; Refill: 1    Gout, unspecified cause, unspecified chronicity, unspecified site  -     Uric acid; Future; Expected date: 06/27/2018        Follow-up for endocrinology evaluation, with me as needed, PCP routinely .

## 2018-06-28 ENCOUNTER — OFFICE VISIT (OUTPATIENT)
Dept: ENDOCRINOLOGY | Facility: CLINIC | Age: 48
End: 2018-06-28
Payer: COMMERCIAL

## 2018-06-28 ENCOUNTER — PATIENT MESSAGE (OUTPATIENT)
Dept: ENDOCRINOLOGY | Facility: CLINIC | Age: 48
End: 2018-06-28

## 2018-06-28 VITALS
WEIGHT: 315 LBS | DIASTOLIC BLOOD PRESSURE: 80 MMHG | BODY MASS INDEX: 40.43 KG/M2 | HEART RATE: 90 BPM | HEIGHT: 74 IN | SYSTOLIC BLOOD PRESSURE: 134 MMHG

## 2018-06-28 DIAGNOSIS — E11.9 TYPE 2 DIABETES MELLITUS WITHOUT COMPLICATION, WITH LONG-TERM CURRENT USE OF INSULIN: Primary | ICD-10-CM

## 2018-06-28 DIAGNOSIS — E78.5 DYSLIPIDEMIA: ICD-10-CM

## 2018-06-28 DIAGNOSIS — E78.2 COMBINED HYPERLIPIDEMIA ASSOCIATED WITH TYPE 2 DIABETES MELLITUS: ICD-10-CM

## 2018-06-28 DIAGNOSIS — Z79.4 TYPE 2 DIABETES MELLITUS WITHOUT COMPLICATION, WITH LONG-TERM CURRENT USE OF INSULIN: Primary | ICD-10-CM

## 2018-06-28 DIAGNOSIS — I15.2 HYPERTENSION ASSOCIATED WITH DIABETES: ICD-10-CM

## 2018-06-28 DIAGNOSIS — E11.69 COMBINED HYPERLIPIDEMIA ASSOCIATED WITH TYPE 2 DIABETES MELLITUS: ICD-10-CM

## 2018-06-28 DIAGNOSIS — E66.01 MORBID OBESITY WITH BMI OF 40.0-44.9, ADULT: ICD-10-CM

## 2018-06-28 DIAGNOSIS — E11.59 HYPERTENSION ASSOCIATED WITH DIABETES: ICD-10-CM

## 2018-06-28 PROCEDURE — 99214 OFFICE O/P EST MOD 30 MIN: CPT | Mod: S$GLB,,, | Performed by: NURSE PRACTITIONER

## 2018-06-28 PROCEDURE — 3079F DIAST BP 80-89 MM HG: CPT | Mod: CPTII,S$GLB,, | Performed by: NURSE PRACTITIONER

## 2018-06-28 PROCEDURE — 3075F SYST BP GE 130 - 139MM HG: CPT | Mod: CPTII,S$GLB,, | Performed by: NURSE PRACTITIONER

## 2018-06-28 PROCEDURE — 99999 PR PBB SHADOW E&M-EST. PATIENT-LVL IV: CPT | Mod: PBBFAC,,, | Performed by: NURSE PRACTITIONER

## 2018-06-28 PROCEDURE — 3008F BODY MASS INDEX DOCD: CPT | Mod: CPTII,S$GLB,, | Performed by: NURSE PRACTITIONER

## 2018-06-28 PROCEDURE — 3046F HEMOGLOBIN A1C LEVEL >9.0%: CPT | Mod: CPTII,S$GLB,, | Performed by: NURSE PRACTITIONER

## 2018-06-28 RX ORDER — INSULIN GLARGINE 100 [IU]/ML
INJECTION, SOLUTION SUBCUTANEOUS
Qty: 15 ML | Refills: 6 | Status: SHIPPED | OUTPATIENT
Start: 2018-06-28 | End: 2019-04-17 | Stop reason: SDUPTHER

## 2018-06-28 RX ORDER — ATORVASTATIN CALCIUM 40 MG/1
TABLET, FILM COATED ORAL
Qty: 90 TABLET | Refills: 2 | Status: SHIPPED | OUTPATIENT
Start: 2018-06-28 | End: 2019-04-17 | Stop reason: SDUPTHER

## 2018-06-28 RX ORDER — METFORMIN HYDROCHLORIDE 500 MG/1
500 TABLET, EXTENDED RELEASE ORAL 2 TIMES DAILY WITH MEALS
Qty: 60 TABLET | Refills: 6 | Status: SHIPPED | OUTPATIENT
Start: 2018-06-28 | End: 2019-04-17 | Stop reason: SDUPTHER

## 2018-06-28 RX ORDER — PEN NEEDLE, DIABETIC 29 G X1/2"
NEEDLE, DISPOSABLE MISCELLANEOUS
Qty: 150 EACH | Refills: 6 | Status: SHIPPED | OUTPATIENT
Start: 2018-06-28 | End: 2019-04-17 | Stop reason: SDUPTHER

## 2018-06-28 RX ORDER — INSULIN LISPRO 100 [IU]/ML
18 INJECTION, SOLUTION INTRAVENOUS; SUBCUTANEOUS
Qty: 2 BOX | Refills: 6 | Status: SHIPPED | OUTPATIENT
Start: 2018-06-28 | End: 2019-04-17 | Stop reason: SDUPTHER

## 2018-06-28 NOTE — LETTER
June 28, 2018      Yoanna Butterfield, REILLY  [address] [facility] [address] [place] [address]           [facility]  [address] [facility] [address] [place] [address]  Phone: [phone]  Fax: [phone]          Patient: Dre Short   MR Number: [id number]   YOB: 1970   Date of Visit: 6/28/2018       Dear Yoanna Butterfield:    Thank you for referring Dre Short to me for evaluation. Attached you will find relevant portions of my assessment and plan of care.    If you have questions, please do not hesitate to call me. I look forward to following Dre Short along with you.    Sincerely,    MEHUL Fuentes,MAXP-C    Enclosure  CC:  No Recipients    If you would like to receive this communication electronically, please contact [email or web address] or [phone] to request more information on [employer] access.    For providers and/or their staff who would like to refer a patient to [facility], please contact us through our one-stop-shop provider referral line, [facility], at [phone].    If you feel you have received this communication in error or would no longer like to receive these types of communications, please e-mail [email or web address]

## 2018-06-28 NOTE — PATIENT INSTRUCTIONS
Hypoglycemia (Low Blood Sugar)     Fast-acting sugar includes a cup of nonfat milk.     Too little sugar (glucose) in your blood is called hypoglycemia or low blood sugar. Low blood sugar usually means anything lower than 70 mg/dL. Talk with your healthcare provider about your target range and what level is too low for you. Diabetes itself doesnt cause low blood sugar. But some of the treatments for diabetes, such as pills or insulin, may raise your risk for it. Low blood sugar may cause you to pass out or have a seizure. So always treat low blood sugar right away, but don't overeat.  Special note: Always carry a source of fast-acting sugar and a snack in case of hypoglycemia.   What you may notice  If you have low blood sugar, you may have one or more of these symptoms:  · Shakiness or dizziness  · Cold, clammy skin or sweating  · Feelings of hunger  · Headache  · Nervousness  · A hard, fast heartbeat  · Weakness  · Confusion or irritability  · Blurred vision  · Having nightmares or waking up confused or sweating  · Numbness or tingling in the lips or tongue  What you should do  Here are tips to follow if you have hypoglycemia:   · First check your blood sugar. If it is too low (out of your target range), eat or drink 15 to 20 grams of fast-acting sugar. This may be 3 to 4 glucose tablets, 4 ounces (half a cup) of fruit juice or regular (nondiet) soda, 8 ounces (1 cup) of fat-free milk, or 1 tablespoon of honey. Dont take more than this, or your blood sugar may go too high.  · Wait 15 minutes. Then recheck your blood sugar if you can.  · If your blood sugar is still too low, repeat the steps above and check your blood sugar again. If your blood sugar still has not returned to your target range, contact your healthcare provider or seek emergency care.  · Once your blood sugar returns to target range, eat a snack or meal.  Preventing low blood sugar  Things you can do include the following:   · If your condition  needs a strict treatment plan, eat your meals and snacks at the same times each day. Dont skip meals!  · If your treatment plan lets you change when you eat and what you eat, learn how to change the time and dose of your rapid-acting insulin to match this.   · Ask your healthcare provider if it is safe for you to drink alcohol. Never drink on an empty stomach.  · Take your medicine at the prescribed times.  · Always carry a source of fast-acting sugar and a snack when youre away from home.  Other things to do  Additional tips include the following:  · Carry a medical ID card, a compact USB drive, or wear a medical alert bracelet or necklace. It should say that you have diabetes. It should also say what to do if you pass out or have a seizure.  · Make sure your family, friends, and coworkers know the signs of low blood sugar. Tell them what to do if your blood sugar falls very low and you cant treat yourself.  · Keep a glucagon emergency kit handy. Be sure your family, friends, and coworkers know how and when to use it. Check it regularly and replace the glucagon before it expires.  · Talk with your health care team about other things you can do to prevent low blood sugar.     If you have unexplained hypoglycemia or hypoglycemia several times, call your healthcare provider.   Date Last Reviewed: 5/1/2016  © 0940-0517 [employer]. [address] [place] [address], [place] [address]. All rights reserved. This information is not intended as a substitute for professional medical care. Always follow your healthcare professional's instructions.

## 2018-06-28 NOTE — PROGRESS NOTES
CC: Mr. Dre Short arrives today for management of Type 2 DM and review of chronic medical conditions, as listed in the Visit Diagnosis section of this encounter.       HPI: Mr. Dre Short was diagnosed with Type 2 DM in 2010. He was diagnosed based on lab work. Initial treatment consisted of metformin. Insulin was added in 2014. Victoza initiated in 11/2017.  + FH of DM in mother, maternal GF. Denies hospitalizations due to DM but had ER visit upon diagnosis.     This is his first time being seen in endocrine.      He was seen this week by Yoanna Butterfield, NP, and restarted on metformin. He had diarrhea and stopped taking.     BG readings are checked 2x/day - fasting and either before or after dinner.             Hypoglycemia: No    Missing Insulin/PO medication doses: Yes, misses Humalog half the time bc he thought that it had to stay refrigerated.   Timing prandial insulin 5-15 minutes before meals: yes    Exercise: No    Dietary Habits: Eats 2 meals/day. Skips breakfast. Snacks on trail mix (with chocolate and raisins) and pretzels. Avoids most sugary beverages but drinks 2 glasses of milk/day. .    Last DM education appointment: 2017      CURRENT DIABETIC MEDS: Victoza 1.2 mg nightly, Lantus 50 units QHS, Humalog 15 units AC + SSI, 150/25  Vial or pen: pen  Glucometer type: unsure - from [facility]    Previous DM treatments:  Metformin - diarrhea    Last Eye Exam: 4/2018, no DR, per patient. Dr. Thomas. [facility].   Last Podiatry Exam: n/a    REVIEW OF SYSTEMS  Constitutional: no c/o fatigue, weakness, or weight loss. C/o > 20 lbs weight gain since November.   Eyes: denies visual disturbances.  ENT: denies dysphagia, hearing loss  Cardiac: no palpitations or chest pain.  Respiratory: no cough or dyspnea.  GI: no c/o abdominal pain or nausea. Denies h/o pancreatitis.   : denies urinary frequency, burning, discharge, frequent UTIs  Skin: no lesions or rashes.  Musculoskeletal:  "denies difficulty mobilizing, reports recent back strain that has improved.   Neuro: no numbness, tingling, or parasthesias.  Endocrine: denies polyphagia, polydipsia, polyuria      Personally reviewed Past Medical, Surgical, Social History.    Vital Signs  /80   Pulse 90   Ht 6' 2" (1.88 m)   Wt (!) 146.7 kg (323 lb 8.4 oz)   BMI 41.54 kg/m²     Personally reviewed the below labs:       Ref. Range 6/26/2018 07:17   A1c Latest Ref Range: <5.7 % of total Hgb 9.1 (H)       Hemoglobin A1C   Date Value Ref Range Status   11/03/2017 10.7 (H) 4.0 - 5.6 % Final     Comment:     According to ADA guidelines, hemoglobin A1c <7.0% represents  optimal control in non-pregnant diabetic patients. Different  metrics may apply to specific patient populations.   Standards of Medical Care in Diabetes-2016.  For the purpose of screening for the presence of diabetes:  <5.7%     Consistent with the absence of diabetes  5.7-6.4%  Consistent with increasing risk for diabetes   (prediabetes)  >or=6.5%  Consistent with diabetes  Currently, no consensus exists for use of hemoglobin A1c  for diagnosis of diabetes for children.  This Hemoglobin A1c assay has significant interference with fetal   hemoglobin   (HbF). The results are invalid for patients with abnormal amounts of   HbF,   including those with known Hereditary Persistence   of Fetal Hemoglobin. Heterozygous hemoglobin variants (HbAS, HbAC,   HbAD, HbAE, HbA2) do not significantly interfere with this assay;   however, presence of multiple variants in a sample may impact the %   interference.     06/14/2017 12.6 (H) 4.0 - 5.6 % Final     Comment:     According to ADA guidelines, hemoglobin A1c <7.0% represents  optimal control in non-pregnant diabetic patients. Different  metrics may apply to specific patient populations.   Standards of Medical Care in Diabetes-2016.  For the purpose of screening for the presence of diabetes:  <5.7%     Consistent with the absence of " diabetes  5.7-6.4%  Consistent with increasing risk for diabetes   (prediabetes)  >or=6.5%  Consistent with diabetes  Currently, no consensus exists for use of hemoglobin A1c  for diagnosis of diabetes for children.  This Hemoglobin A1c assay has significant interference with fetal   hemoglobin   (HbF). The results are invalid for patients with abnormal amounts of   HbF,   including those with known Hereditary Persistence   of Fetal Hemoglobin. Heterozygous hemoglobin variants (HbAS, HbAC,   HbAD, HbAE, HbA2) do not significantly interfere with this assay;   however, presence of multiple variants in a sample may impact the %   interference.     04/18/2016 13.6 (H) 4.5 - 6.2 % Final       Chemistry        Component Value Date/Time     05/11/2018 0713    K 4.9 05/11/2018 0713     05/11/2018 0713    CO2 30 05/11/2018 0713    BUN 10 05/11/2018 0713    CREATININE 0.89 05/11/2018 0713     (H) 05/11/2018 0713        Component Value Date/Time    CALCIUM 9.4 05/11/2018 0713    ALKPHOS 75 05/11/2018 0713    AST 26 05/11/2018 0713    ALT 30 05/11/2018 0713    BILITOT 0.7 05/11/2018 0713    ESTGFRAFRICA 116 05/11/2018 0713    EGFRNONAA 100 05/11/2018 0713          Lab Results   Component Value Date    CHOL 138 11/03/2017    CHOL 231 (H) 06/14/2017    CHOL 212 (H) 04/18/2016     Lab Results   Component Value Date    HDL 35 (L) 11/03/2017    HDL 37 (L) 06/14/2017    HDL 35 (L) 04/18/2016     Lab Results   Component Value Date    LDLCALC 79.4 11/03/2017    LDLCALC 129.0 06/14/2017    LDLCALC 125.6 04/18/2016     Lab Results   Component Value Date    TRIG 118 11/03/2017    TRIG 325 (H) 06/14/2017    TRIG 257 (H) 04/18/2016     Lab Results   Component Value Date    CHOLHDL 25.4 11/03/2017    CHOLHDL 16.0 (L) 06/14/2017    CHOLHDL 16.5 (L) 04/18/2016       Lab Results   Component Value Date    MICALBCREAT 12.1 06/14/2017     Lab Results   Component Value Date    TSH 1.661 11/03/2017       CrCl cannot be calculated  (Patient's most recent lab result is older than the maximum 7 days allowed.).    No results found for: [id number]      PHYSICAL EXAMINATION  Constitutional: Appears well, no distress  Neck: Supple, trachea midline; no thyromegaly or nodules.   Respiratory: CTA, even and unlabored.  Cardiovascular: RRR, no murmurs, no carotid bruits. DP pulses  2+ bilaterally; no edema.    GI: bowel sounds active, no hernia noted  Lymph: no cervical or supraclavicular lymphadenopathy  Skin: warm and dry; no lipohypertrophy, or acanthosis nigracans observed.  Psych: normal affect, pleasant mood, conversant  Musculoskeletal: steady gait, no clubbing, full ROM to all extremities  Neuro: CN II-XII grossly intact  Feet: appropriate footwear.      Assessment/Plan  1. Type 2 diabetes mellitus without complication, with long-term current use of insulin  -- Uncontrolled, however, A1c has decreased. Glucoses globally elevated.  -- increase Victoza to 1.8 mg daily and take in AM, before first meal.  Can consider Ozempic in the future.   -- increase Humalog to 18 units and continue correction scale. Explained to pt that insulin may be stored at room temp for 28 days. Should keep unused pens refrigerated.   -- continue Lantus 50 units QHS for now.   -- will trial metformin  mg with dinner. If tolerating well, increase to BID after 1 week.    -- Discussed diagnosis of DM, A1c goals, progression of disease, long term complications and tx options.  -- Reviewed hypoglycemia management: treat with 1/2 glass of juice, 1/2 can regular coke, or 4 glucose tablets. Monitor and repeat treatment every 15 minutes until BG is >70 Then have a snack, which includes a complex carbohydrate and protein.   Advised patient to check BG before activities, such as driving or exercise.   2. Hypertension associated with diabetes  -- acceptable  -- continue enalapril   3. Combined hyperlipidemia associated with type 2 diabetes mellitus  -- controlled  -- continue  atorvastatin   4. Morbid obesity with BMI of 40.0-44.9, adult  -- increasing insulin resistance  -- Body mass index is 41.54 kg/m².       FOLLOW UP  Follow-up in about 3 months (around 9/28/2018).   Patient instructed to bring BG logs to each follow up   Patient encouraged to call for any BG/medication issues, concerns, or questions.    Labs at [facility]:  Orders Placed This Encounter   Procedures    Hemoglobin A1c    Microalbumin/creatinine urine ratio

## 2018-07-31 ENCOUNTER — OFFICE VISIT (OUTPATIENT)
Dept: PODIATRY | Facility: CLINIC | Age: 48
End: 2018-07-31
Payer: COMMERCIAL

## 2018-07-31 VITALS — BODY MASS INDEX: 40.43 KG/M2 | WEIGHT: 315 LBS | HEIGHT: 74 IN

## 2018-07-31 DIAGNOSIS — R60.0 BILATERAL EDEMA OF LOWER EXTREMITY: ICD-10-CM

## 2018-07-31 DIAGNOSIS — E11.49 TYPE II DIABETES MELLITUS WITH NEUROLOGICAL MANIFESTATIONS: Primary | ICD-10-CM

## 2018-07-31 DIAGNOSIS — E11.51 TYPE II DIABETES MELLITUS WITH PERIPHERAL CIRCULATORY DISORDER: ICD-10-CM

## 2018-07-31 DIAGNOSIS — E66.01 MORBID OBESITY WITH BMI OF 40.0-44.9, ADULT: ICD-10-CM

## 2018-07-31 PROCEDURE — 99203 OFFICE O/P NEW LOW 30 MIN: CPT | Mod: S$GLB,,, | Performed by: PODIATRIST

## 2018-07-31 PROCEDURE — 3008F BODY MASS INDEX DOCD: CPT | Mod: CPTII,S$GLB,, | Performed by: PODIATRIST

## 2018-07-31 PROCEDURE — 99999 PR PBB SHADOW E&M-EST. PATIENT-LVL III: CPT | Mod: PBBFAC,,, | Performed by: PODIATRIST

## 2018-07-31 PROCEDURE — 3046F HEMOGLOBIN A1C LEVEL >9.0%: CPT | Mod: CPTII,S$GLB,, | Performed by: PODIATRIST

## 2018-07-31 NOTE — PATIENT INSTRUCTIONS
600 mg alpha lipoic acid daily   B-Complex Vitamin daily  Pain cream 3-4 times daily as needed    Consider starting on Gabapentin if these are not working

## 2018-08-09 NOTE — PROGRESS NOTES
Subjective:      Patient ID: Dre Short is a 48 y.o. male.    Chief Complaint: Foot Swelling (bilateral foot swelling, PCP-(REILLY Cabrera)-06/27/2018) and Diabetes Mellitus (A1c-10.7-11/03/2017)    Dre is a 48 y.o. male who presents to the clinic upon referral from Dr. Blanco  for evaluation and treatment of diabetic feet. Dre has a past medical history of Colon polyps (7/3/2012); Combined hyperlipidemia associated with type 2 diabetes mellitus; Diabetes mellitus type II, uncontrolled; Diverticulitis of colon (7/3/2012); Fatty liver; Hyperlipidemia; Hypertension associated with diabetes; and Sleep apnea. Patient relates pain in bilateral feet with swelling. The pain is burning and tingling and is constant regardless of weight bearing status. Has not tried prior treatments to date.    PCP: Scott Peñaloza MD    Date Last Seen by PCP: 6/27/18    Current shoe gear: Casual shoes    Hemoglobin A1C   Date Value Ref Range Status   11/03/2017 10.7 (H) 4.0 - 5.6 % Final     Comment:     According to ADA guidelines, hemoglobin A1c <7.0% represents  optimal control in non-pregnant diabetic patients. Different  metrics may apply to specific patient populations.   Standards of Medical Care in Diabetes-2016.  For the purpose of screening for the presence of diabetes:  <5.7%     Consistent with the absence of diabetes  5.7-6.4%  Consistent with increasing risk for diabetes   (prediabetes)  >or=6.5%  Consistent with diabetes  Currently, no consensus exists for use of hemoglobin A1c  for diagnosis of diabetes for children.  This Hemoglobin A1c assay has significant interference with fetal   hemoglobin   (HbF). The results are invalid for patients with abnormal amounts of   HbF,   including those with known Hereditary Persistence   of Fetal Hemoglobin. Heterozygous hemoglobin variants (HbAS, HbAC,   HbAD, HbAE, HbA2) do not significantly interfere with this assay;   however, presence of multiple variants in a sample  may impact the %   interference.     06/14/2017 12.6 (H) 4.0 - 5.6 % Final     Comment:     According to ADA guidelines, hemoglobin A1c <7.0% represents  optimal control in non-pregnant diabetic patients. Different  metrics may apply to specific patient populations.   Standards of Medical Care in Diabetes-2016.  For the purpose of screening for the presence of diabetes:  <5.7%     Consistent with the absence of diabetes  5.7-6.4%  Consistent with increasing risk for diabetes   (prediabetes)  >or=6.5%  Consistent with diabetes  Currently, no consensus exists for use of hemoglobin A1c  for diagnosis of diabetes for children.  This Hemoglobin A1c assay has significant interference with fetal   hemoglobin   (HbF). The results are invalid for patients with abnormal amounts of   HbF,   including those with known Hereditary Persistence   of Fetal Hemoglobin. Heterozygous hemoglobin variants (HbAS, HbAC,   HbAD, HbAE, HbA2) do not significantly interfere with this assay;   however, presence of multiple variants in a sample may impact the %   interference.     04/18/2016 13.6 (H) 4.5 - 6.2 % Final           Review of Systems   Constitution: Negative for chills and fever.   Cardiovascular: Positive for leg swelling. Negative for claudication.   Respiratory: Negative for shortness of breath.    Skin: Positive for nail changes. Negative for itching and rash.   Musculoskeletal: Positive for arthritis. Negative for muscle cramps, muscle weakness and myalgias.   Gastrointestinal: Negative for nausea and vomiting.   Neurological: Positive for numbness and paresthesias. Negative for focal weakness and loss of balance.           Objective:      Physical Exam   Constitutional: He is oriented to person, place, and time. He appears well-developed and well-nourished. No distress.   Cardiovascular:   Pulses:       Dorsalis pedis pulses are 2+ on the right side, and 2+ on the left side.        Posterior tibial pulses are 2+ on the right  side, and 2+ on the left side.   < 3 sec capillary refill time to toes 1-5 bilateral. Feet are warm to touch proximally with distal cooling b/l. There is no hair growth on the feet and toes b/l. There is moderate edema b/l. No spider veins or varicosities present b/l.      Musculoskeletal:   Equinus noted b/l ankles with < 10 deg DF noted. MMT 5/5 in DF/PF/Inv/Ev resistance with no reproduction of pain in any direction. Passive range of motion of ankle and pedal joints is painless b/l.     Neurological: He is alert and oriented to person, place, and time. He has normal strength. He displays no atrophy and no tremor. A sensory deficit is present. He exhibits normal muscle tone.   Negative tinel sign bilateral. Diminished vibratory and protective sensation bilateral.   Skin: Skin is warm, dry and intact. No abrasion, no bruising, no burn, no ecchymosis, no laceration, no lesion, no petechiae and no rash noted. He is not diaphoretic. No cyanosis or erythema. No pallor. Nails show no clubbing.   Skin temperature, texture and turgor within normal limits.       Psychiatric: He has a normal mood and affect. His behavior is normal.             Assessment:       Encounter Diagnoses   Name Primary?    Type II diabetes mellitus with neurological manifestations Yes    Type II diabetes mellitus with peripheral circulatory disorder     Morbid obesity with BMI of 40.0-44.9, adult     Bilateral edema of lower extremity          Plan:       Dre was seen today for foot swelling and diabetes mellitus.    Diagnoses and all orders for this visit:    Type II diabetes mellitus with neurological manifestations    Type II diabetes mellitus with peripheral circulatory disorder    Morbid obesity with BMI of 40.0-44.9, adult    Bilateral edema of lower extremity      I counseled the patient on his conditions, their implications and medical management.    Shoe inspection. Diabetic Foot Education. Patient reminded of the importance of good  nutrition and blood sugar control to help prevent podiatric complications of diabetes. Patient instructed on proper foot hygeine. We discussed wearing proper shoe gear, daily foot inspections, never walking without protective shoe gear, never putting sharp instruments to feet    Discussed treatments of neuropathy including pain creams, OTC mediations and prescriptions including gabapentin and lyrica. Will begin with OTC options, he will start with alpha lipoic acid 600 mg daily with a B complex vitamin. If there is no improvement will consider starting on gabapentin.    Prescribed compound analgesic pain cream containing Flurbiprofen, Amitryptylline, Gabapentin, Lidociane and Prilocaine with addition of Ketamine transdermal gel for additional relief from [facility].    As for the swelling we discussed multiple etiologies including venous insufficiency, cardiovascular or renal disease etc. He would be best worked up by his PCP, in the mean time I recommended watching his salt intake, compression stockings OTC and elevation of the limbs when able.    Return in 2 months for follow up neuropathy pain.    Alfredo Shirley DPM

## 2018-09-11 ENCOUNTER — OFFICE VISIT (OUTPATIENT)
Dept: PODIATRY | Facility: CLINIC | Age: 48
End: 2018-09-11
Payer: COMMERCIAL

## 2018-09-11 VITALS — WEIGHT: 315 LBS | HEIGHT: 74 IN | BODY MASS INDEX: 40.43 KG/M2

## 2018-09-11 DIAGNOSIS — E66.01 MORBID OBESITY WITH BMI OF 40.0-44.9, ADULT: ICD-10-CM

## 2018-09-11 DIAGNOSIS — E11.51 TYPE II DIABETES MELLITUS WITH PERIPHERAL CIRCULATORY DISORDER: ICD-10-CM

## 2018-09-11 DIAGNOSIS — E11.49 TYPE II DIABETES MELLITUS WITH NEUROLOGICAL MANIFESTATIONS: Primary | ICD-10-CM

## 2018-09-11 PROCEDURE — 99214 OFFICE O/P EST MOD 30 MIN: CPT | Mod: S$GLB,,, | Performed by: PODIATRIST

## 2018-09-11 PROCEDURE — 99999 PR PBB SHADOW E&M-EST. PATIENT-LVL III: CPT | Mod: PBBFAC,,, | Performed by: PODIATRIST

## 2018-09-11 PROCEDURE — 3046F HEMOGLOBIN A1C LEVEL >9.0%: CPT | Mod: CPTII,S$GLB,, | Performed by: PODIATRIST

## 2018-09-11 PROCEDURE — 3008F BODY MASS INDEX DOCD: CPT | Mod: CPTII,S$GLB,, | Performed by: PODIATRIST

## 2018-09-11 RX ORDER — GABAPENTIN 300 MG/1
300 CAPSULE ORAL 2 TIMES DAILY
Qty: 60 CAPSULE | Refills: 11 | Status: SHIPPED | OUTPATIENT
Start: 2018-09-11 | End: 2018-11-12

## 2018-09-11 RX ORDER — VITAMIN B COMPLEX
1 CAPSULE ORAL DAILY
COMMUNITY

## 2018-09-11 RX ORDER — PERPHENAZINE 16 MG
TABLET ORAL
COMMUNITY

## 2018-09-12 NOTE — PROGRESS NOTES
Subjective:      Patient ID: Dre Short is a 48 y.o. male.    Chief Complaint: Foot Swelling (6 week f/u feet swollen, PCP-(REILLY Cabrera)-06/27/2018) and Diabetes Mellitus (A1c-9.1-06/26/2018, Endo-Jodi,ARNP-06/28/2018)    Dre is a 48 y.o. male who presents to the clinic upon referral from Dr. Ordonez ref. provider found  for evaluation and treatment of diabetic feet. Dre has a past medical history of Colon polyps (7/3/2012), Combined hyperlipidemia associated with type 2 diabetes mellitus, Diabetes mellitus type II, uncontrolled, Diverticulitis of colon (7/3/2012), Fatty liver, Hyperlipidemia, Hypertension associated with diabetes, and Sleep apnea. Patient relates pain in bilateral feet with swelling. The pain is burning and tingling and is constant regardless of weight bearing status. Has not tried prior treatments to date.    9/11/18: Patient returns for follow up bilateral foot pain secondary to neuropathy. He has been trying the alpha lipoic acid and the complex B vitamin daily with some relief of symptoms, also feels the pain cream is helping. He however is still having 4/10 pain and is looking for other treatment options. No new pedal complaints.    PCP: Scott Peñaloza MD    Date Last Seen by PCP: 6/27/18    Current shoe gear: Casual shoes    Hemoglobin A1C   Date Value Ref Range Status   11/03/2017 10.7 (H) 4.0 - 5.6 % Final     Comment:     According to ADA guidelines, hemoglobin A1c <7.0% represents  optimal control in non-pregnant diabetic patients. Different  metrics may apply to specific patient populations.   Standards of Medical Care in Diabetes-2016.  For the purpose of screening for the presence of diabetes:  <5.7%     Consistent with the absence of diabetes  5.7-6.4%  Consistent with increasing risk for diabetes   (prediabetes)  >or=6.5%  Consistent with diabetes  Currently, no consensus exists for use of hemoglobin A1c  for diagnosis of diabetes for children.  This Hemoglobin  A1c assay has significant interference with fetal   hemoglobin   (HbF). The results are invalid for patients with abnormal amounts of   HbF,   including those with known Hereditary Persistence   of Fetal Hemoglobin. Heterozygous hemoglobin variants (HbAS, HbAC,   HbAD, HbAE, HbA2) do not significantly interfere with this assay;   however, presence of multiple variants in a sample may impact the %   interference.     06/14/2017 12.6 (H) 4.0 - 5.6 % Final     Comment:     According to ADA guidelines, hemoglobin A1c <7.0% represents  optimal control in non-pregnant diabetic patients. Different  metrics may apply to specific patient populations.   Standards of Medical Care in Diabetes-2016.  For the purpose of screening for the presence of diabetes:  <5.7%     Consistent with the absence of diabetes  5.7-6.4%  Consistent with increasing risk for diabetes   (prediabetes)  >or=6.5%  Consistent with diabetes  Currently, no consensus exists for use of hemoglobin A1c  for diagnosis of diabetes for children.  This Hemoglobin A1c assay has significant interference with fetal   hemoglobin   (HbF). The results are invalid for patients with abnormal amounts of   HbF,   including those with known Hereditary Persistence   of Fetal Hemoglobin. Heterozygous hemoglobin variants (HbAS, HbAC,   HbAD, HbAE, HbA2) do not significantly interfere with this assay;   however, presence of multiple variants in a sample may impact the %   interference.     04/18/2016 13.6 (H) 4.5 - 6.2 % Final           Review of Systems   Constitution: Negative for chills and fever.   Cardiovascular: Positive for leg swelling. Negative for claudication.   Respiratory: Negative for shortness of breath.    Skin: Positive for nail changes. Negative for itching and rash.   Musculoskeletal: Positive for arthritis. Negative for muscle cramps, muscle weakness and myalgias.   Gastrointestinal: Negative for nausea and vomiting.   Neurological: Positive for numbness and  paresthesias. Negative for focal weakness and loss of balance.           Objective:      Physical Exam   Constitutional: He is oriented to person, place, and time. He appears well-developed and well-nourished. No distress.   Cardiovascular:   Pulses:       Dorsalis pedis pulses are 2+ on the right side, and 2+ on the left side.        Posterior tibial pulses are 2+ on the right side, and 2+ on the left side.   < 3 sec capillary refill time to toes 1-5 bilateral. Feet are warm to touch proximally with distal cooling b/l. There is no hair growth on the feet and toes b/l. There is moderate edema b/l. No spider veins or varicosities present b/l.      Musculoskeletal:   Equinus noted b/l ankles with < 10 deg DF noted. MMT 5/5 in DF/PF/Inv/Ev resistance with no reproduction of pain in any direction. Passive range of motion of ankle and pedal joints is painless b/l.     Neurological: He is alert and oriented to person, place, and time. He has normal strength. He displays no atrophy and no tremor. A sensory deficit is present. He exhibits normal muscle tone.   Negative tinel sign bilateral. Diminished vibratory and protective sensation bilateral.   Skin: Skin is warm, dry and intact. No abrasion, no bruising, no burn, no ecchymosis, no laceration, no lesion, no petechiae and no rash noted. He is not diaphoretic. No cyanosis or erythema. No pallor. Nails show no clubbing.   Skin temperature, texture and turgor within normal limits.       Psychiatric: He has a normal mood and affect. His behavior is normal.             Assessment:       Encounter Diagnoses   Name Primary?    Type II diabetes mellitus with neurological manifestations Yes    Type II diabetes mellitus with peripheral circulatory disorder          Plan:       Dre was seen today for foot swelling and diabetes mellitus.    Diagnoses and all orders for this visit:    Type II diabetes mellitus with neurological manifestations    Type II diabetes mellitus with  peripheral circulatory disorder    Other orders  -     gabapentin (NEURONTIN) 300 MG capsule; Take 1 capsule (300 mg total) by mouth 2 (two) times daily. Take 1 tab before bed for 3 weeks then take 1 tab twice daily.      I counseled the patient on his conditions, their implications and medical management.    Shoe inspection. Diabetic Foot Education. Patient reminded of the importance of good nutrition and blood sugar control to help prevent podiatric complications of diabetes. Patient instructed on proper foot hygeine. We discussed wearing proper shoe gear, daily foot inspections, never walking without protective shoe gear, never putting sharp instruments to feet    Discussed treatments of neuropathy including pain creams, OTC mediations and prescriptions including gabapentin and lyrica.     He will need to continue using the alpha lipoic acid and the b complex vitamins    Started on gabapentin 300 mg QHS to start then increase to BID.    Discussed importance of healthy diet and weight loss as to his diabetes control.    Return in 2 months for follow up neuropathy pain.    Alfredo Shirley DPM

## 2018-11-02 ENCOUNTER — TELEPHONE (OUTPATIENT)
Dept: FAMILY MEDICINE | Facility: CLINIC | Age: 48
End: 2018-11-02

## 2018-11-02 DIAGNOSIS — E78.5 DYSLIPIDEMIA: ICD-10-CM

## 2018-11-02 DIAGNOSIS — Z00.00 ROUTINE MEDICAL EXAM: Primary | ICD-10-CM

## 2018-11-02 DIAGNOSIS — E11.69 COMBINED HYPERLIPIDEMIA ASSOCIATED WITH TYPE 2 DIABETES MELLITUS: ICD-10-CM

## 2018-11-02 DIAGNOSIS — E78.2 COMBINED HYPERLIPIDEMIA ASSOCIATED WITH TYPE 2 DIABETES MELLITUS: ICD-10-CM

## 2018-11-02 RX ORDER — FENOFIBRATE 54 MG/1
TABLET ORAL
Qty: 90 TABLET | Refills: 0 | Status: SHIPPED | OUTPATIENT
Start: 2018-11-02 | End: 2019-02-08 | Stop reason: SDUPTHER

## 2018-11-02 NOTE — TELEPHONE ENCOUNTER
Attempted to contact patient to schedule annual with PCP and follow up appt with NP Sky/Endo dept. Left message asking to return call to clinic.

## 2018-11-02 NOTE — TELEPHONE ENCOUNTER
Schedule annual with pcp with fasting labs prior within the next few months. Also overdue for endo follow up--schedule with REILLY Palma with her labs prior.

## 2018-11-12 ENCOUNTER — OFFICE VISIT (OUTPATIENT)
Dept: PODIATRY | Facility: CLINIC | Age: 48
End: 2018-11-12
Payer: COMMERCIAL

## 2018-11-12 VITALS
HEART RATE: 94 BPM | HEIGHT: 74 IN | DIASTOLIC BLOOD PRESSURE: 89 MMHG | SYSTOLIC BLOOD PRESSURE: 151 MMHG | WEIGHT: 315 LBS | BODY MASS INDEX: 40.43 KG/M2

## 2018-11-12 DIAGNOSIS — M77.42 METATARSALGIA OF BOTH FEET: ICD-10-CM

## 2018-11-12 DIAGNOSIS — E11.49 TYPE II DIABETES MELLITUS WITH NEUROLOGICAL MANIFESTATIONS: Primary | ICD-10-CM

## 2018-11-12 DIAGNOSIS — E11.51 TYPE II DIABETES MELLITUS WITH PERIPHERAL CIRCULATORY DISORDER: ICD-10-CM

## 2018-11-12 DIAGNOSIS — M77.41 METATARSALGIA OF BOTH FEET: ICD-10-CM

## 2018-11-12 PROCEDURE — 3008F BODY MASS INDEX DOCD: CPT | Mod: CPTII,S$GLB,, | Performed by: PODIATRIST

## 2018-11-12 PROCEDURE — 3077F SYST BP >= 140 MM HG: CPT | Mod: CPTII,S$GLB,, | Performed by: PODIATRIST

## 2018-11-12 PROCEDURE — 3079F DIAST BP 80-89 MM HG: CPT | Mod: CPTII,S$GLB,, | Performed by: PODIATRIST

## 2018-11-12 PROCEDURE — 3046F HEMOGLOBIN A1C LEVEL >9.0%: CPT | Mod: CPTII,S$GLB,, | Performed by: PODIATRIST

## 2018-11-12 PROCEDURE — 99999 PR PBB SHADOW E&M-EST. PATIENT-LVL III: CPT | Mod: PBBFAC,,, | Performed by: PODIATRIST

## 2018-11-12 PROCEDURE — 99213 OFFICE O/P EST LOW 20 MIN: CPT | Mod: S$GLB,,, | Performed by: PODIATRIST

## 2018-11-12 RX ORDER — GABAPENTIN 300 MG/1
300 CAPSULE ORAL 3 TIMES DAILY
Qty: 90 CAPSULE | Refills: 11 | Status: SHIPPED | OUTPATIENT
Start: 2018-11-12 | End: 2019-08-14

## 2018-11-12 RX ORDER — GLYBURIDE 5 MG/1
5 TABLET ORAL
COMMUNITY
Start: 2013-04-05 | End: 2020-01-15

## 2018-11-12 NOTE — PROGRESS NOTES
Subjective:      Patient ID: Dre Short is a 48 y.o. male.    Chief Complaint: Follow-up (2 month f/u neuropathy pain, PCP-(REILLY Cabrera)-06/27/2018) and Diabetes Mellitus (A1c-9.1-06/26/2018, Endo-A.Jarrott,NP-06/28/2018)    Dre is a 48 y.o. male who presents to the clinic upon referral from Dr. Ordonez ref. provider found  for evaluation and treatment of diabetic feet. Dre has a past medical history of Colon polyps (7/3/2012), Combined hyperlipidemia associated with type 2 diabetes mellitus, Diabetes mellitus type II, uncontrolled, Diverticulitis of colon (7/3/2012), Fatty liver, Hyperlipidemia, Hypertension associated with diabetes, and Sleep apnea. Patient relates pain in bilateral feet with swelling. The pain is burning and tingling and is constant regardless of weight bearing status. Has not tried prior treatments to date.    9/11/18: Patient returns for follow up bilateral foot pain secondary to neuropathy. He has been trying the alpha lipoic acid and the complex B vitamin daily with some relief of symptoms, also feels the pain cream is helping. He however is still having 4/10 pain and is looking for other treatment options. No new pedal complaints.    11/12/18: Patient returns for follow up bilateral neuropathy pain, he relates the gabapentin is helping significantly and he is doing much better overall, there is still a 3/10 pain mostly to the ball of the foot bilateral, worse with weight bearing. No other pedal changes noted.     PCP: Scott Peñaloza MD    Date Last Seen by PCP: 6/27/18    Current shoe gear: Casual shoes    Hemoglobin A1C   Date Value Ref Range Status   11/03/2017 10.7 (H) 4.0 - 5.6 % Final     Comment:     According to ADA guidelines, hemoglobin A1c <7.0% represents  optimal control in non-pregnant diabetic patients. Different  metrics may apply to specific patient populations.   Standards of Medical Care in Diabetes-2016.  For the purpose of screening for the presence of  diabetes:  <5.7%     Consistent with the absence of diabetes  5.7-6.4%  Consistent with increasing risk for diabetes   (prediabetes)  >or=6.5%  Consistent with diabetes  Currently, no consensus exists for use of hemoglobin A1c  for diagnosis of diabetes for children.  This Hemoglobin A1c assay has significant interference with fetal   hemoglobin   (HbF). The results are invalid for patients with abnormal amounts of   HbF,   including those with known Hereditary Persistence   of Fetal Hemoglobin. Heterozygous hemoglobin variants (HbAS, HbAC,   HbAD, HbAE, HbA2) do not significantly interfere with this assay;   however, presence of multiple variants in a sample may impact the %   interference.     06/14/2017 12.6 (H) 4.0 - 5.6 % Final     Comment:     According to ADA guidelines, hemoglobin A1c <7.0% represents  optimal control in non-pregnant diabetic patients. Different  metrics may apply to specific patient populations.   Standards of Medical Care in Diabetes-2016.  For the purpose of screening for the presence of diabetes:  <5.7%     Consistent with the absence of diabetes  5.7-6.4%  Consistent with increasing risk for diabetes   (prediabetes)  >or=6.5%  Consistent with diabetes  Currently, no consensus exists for use of hemoglobin A1c  for diagnosis of diabetes for children.  This Hemoglobin A1c assay has significant interference with fetal   hemoglobin   (HbF). The results are invalid for patients with abnormal amounts of   HbF,   including those with known Hereditary Persistence   of Fetal Hemoglobin. Heterozygous hemoglobin variants (HbAS, HbAC,   HbAD, HbAE, HbA2) do not significantly interfere with this assay;   however, presence of multiple variants in a sample may impact the %   interference.     04/18/2016 13.6 (H) 4.5 - 6.2 % Final           Review of Systems   Constitution: Negative for chills and fever.   Cardiovascular: Positive for leg swelling. Negative for claudication.   Respiratory: Negative for  shortness of breath.    Skin: Positive for nail changes. Negative for itching and rash.   Musculoskeletal: Positive for arthritis. Negative for muscle cramps, muscle weakness and myalgias.   Gastrointestinal: Negative for nausea and vomiting.   Neurological: Positive for numbness and paresthesias. Negative for focal weakness and loss of balance.           Objective:      Physical Exam   Constitutional: He is oriented to person, place, and time. He appears well-developed and well-nourished. No distress.   Cardiovascular:   Pulses:       Dorsalis pedis pulses are 2+ on the right side, and 2+ on the left side.        Posterior tibial pulses are 2+ on the right side, and 2+ on the left side.   < 3 sec capillary refill time to toes 1-5 bilateral. Feet are warm to touch proximally with distal cooling b/l. There is no hair growth on the feet and toes b/l. There is moderate edema b/l. No spider veins or varicosities present b/l.      Musculoskeletal:   Bilateral diffuse discomfort to palpation at the plantar metatarsal heads area.    Equinus noted b/l ankles with < 10 deg DF noted. MMT 5/5 in DF/PF/Inv/Ev resistance with no reproduction of pain in any direction. Passive range of motion of ankle and pedal joints is painless b/l.     Neurological: He is alert and oriented to person, place, and time. He has normal strength. He displays no atrophy and no tremor. A sensory deficit is present. He exhibits normal muscle tone.   Negative tinel sign bilateral. Diminished vibratory and protective sensation bilateral.   Skin: Skin is warm, dry and intact. No abrasion, no bruising, no burn, no ecchymosis, no laceration, no lesion, no petechiae and no rash noted. He is not diaphoretic. No cyanosis or erythema. No pallor. Nails show no clubbing.   Skin temperature, texture and turgor within normal limits.       Psychiatric: He has a normal mood and affect. His behavior is normal.             Assessment:       Encounter Diagnoses   Name  Primary?    Type II diabetes mellitus with neurological manifestations Yes    Type II diabetes mellitus with peripheral circulatory disorder     Metatarsalgia of both feet          Plan:       Dre was seen today for follow-up and diabetes mellitus.    Diagnoses and all orders for this visit:    Type II diabetes mellitus with neurological manifestations    Type II diabetes mellitus with peripheral circulatory disorder    Metatarsalgia of both feet    Other orders  -     gabapentin (NEURONTIN) 300 MG capsule; Take 1 capsule (300 mg total) by mouth 3 (three) times daily. Take 1 tab before bed for 3 weeks then take 1 tab twice daily.      I counseled the patient on his conditions, their implications and medical management.    Shoe inspection. Diabetic Foot Education. Patient reminded of the importance of good nutrition and blood sugar control to help prevent podiatric complications of diabetes. Patient instructed on proper foot hygeine. We discussed wearing proper shoe gear, daily foot inspections, never walking without protective shoe gear, never putting sharp instruments to feet    Discussed treatments of neuropathy including pain creams, OTC mediations and prescriptions including gabapentin and lyrica.     He will need to continue using the alpha lipoic acid and the b complex vitamins    Increase gabapentin 300 mg to three times daily.    Discussed importance of healthy diet and weight loss as to his diabetes control.    Patient will obtain over the counter arch supports and wear them in shoes whenever possible.  Athletic shoes intended for walking or running are usually best.    Return in 6 months for follow up neuropathy pain.    Alfredo Shirley DPM

## 2018-11-12 NOTE — PATIENT INSTRUCTIONS
2. Supportive shoes at all times (athletic shoe including herring, new balance, asics, HOKA. For house slippers would recommend Fitflop or Spenco found on amazon.com, Never walk barefoot or in flats.    ([employer], [employer], [place] running [employer], Masseys, Goodfeet, Cantilever, Feet First, Foot Solutions, Therapeutic shoes, SAS, [facility] [employer] [facility] pro shop) [email or web address]    3. Orthotic (recommend the following brands: Superfeet, Spenco, Powerstep, Sof Sole Fit Series)

## 2018-12-30 ENCOUNTER — TELEPHONE (OUTPATIENT)
Dept: FAMILY MEDICINE | Facility: CLINIC | Age: 48
End: 2018-12-30

## 2018-12-30 DIAGNOSIS — I10 ESSENTIAL HYPERTENSION: ICD-10-CM

## 2018-12-31 RX ORDER — ENALAPRIL MALEATE 10 MG/1
TABLET ORAL
Qty: 90 TABLET | Refills: 0 | Status: SHIPPED | OUTPATIENT
Start: 2018-12-31 | End: 2019-04-17 | Stop reason: DRUGHIGH

## 2018-12-31 NOTE — TELEPHONE ENCOUNTER
Overdue for follow up with PCP--please schedule    Also he never followed up with endo--please schedule

## 2019-01-25 ENCOUNTER — PATIENT MESSAGE (OUTPATIENT)
Dept: ADMINISTRATIVE | Facility: HOSPITAL | Age: 49
End: 2019-01-25

## 2019-02-08 DIAGNOSIS — E78.5 DYSLIPIDEMIA: ICD-10-CM

## 2019-02-08 DIAGNOSIS — E11.69 COMBINED HYPERLIPIDEMIA ASSOCIATED WITH TYPE 2 DIABETES MELLITUS: ICD-10-CM

## 2019-02-08 DIAGNOSIS — E78.2 COMBINED HYPERLIPIDEMIA ASSOCIATED WITH TYPE 2 DIABETES MELLITUS: ICD-10-CM

## 2019-02-08 RX ORDER — FENOFIBRATE 54 MG/1
TABLET ORAL
Qty: 90 TABLET | Refills: 0 | Status: SHIPPED | OUTPATIENT
Start: 2019-02-08 | End: 2019-04-17 | Stop reason: SDUPTHER

## 2019-04-17 ENCOUNTER — PATIENT MESSAGE (OUTPATIENT)
Dept: ENDOCRINOLOGY | Facility: CLINIC | Age: 49
End: 2019-04-17

## 2019-04-17 ENCOUNTER — OFFICE VISIT (OUTPATIENT)
Dept: FAMILY MEDICINE | Facility: CLINIC | Age: 49
End: 2019-04-17
Payer: COMMERCIAL

## 2019-04-17 VITALS
DIASTOLIC BLOOD PRESSURE: 98 MMHG | OXYGEN SATURATION: 96 % | WEIGHT: 315 LBS | BODY MASS INDEX: 40.43 KG/M2 | SYSTOLIC BLOOD PRESSURE: 158 MMHG | HEART RATE: 100 BPM | HEIGHT: 74 IN

## 2019-04-17 DIAGNOSIS — Z12.5 SCREENING FOR PROSTATE CANCER: ICD-10-CM

## 2019-04-17 DIAGNOSIS — E11.3293 NON-PROLIFERATIVE DIABETIC RETINOPATHY, BOTH EYES: ICD-10-CM

## 2019-04-17 DIAGNOSIS — E78.2 COMBINED HYPERLIPIDEMIA ASSOCIATED WITH TYPE 2 DIABETES MELLITUS: ICD-10-CM

## 2019-04-17 DIAGNOSIS — E11.59 HYPERTENSION ASSOCIATED WITH DIABETES: ICD-10-CM

## 2019-04-17 DIAGNOSIS — I15.2 HYPERTENSION ASSOCIATED WITH DIABETES: ICD-10-CM

## 2019-04-17 DIAGNOSIS — E11.69 COMBINED HYPERLIPIDEMIA ASSOCIATED WITH TYPE 2 DIABETES MELLITUS: ICD-10-CM

## 2019-04-17 DIAGNOSIS — Z79.4 TYPE 2 DIABETES MELLITUS WITHOUT COMPLICATION, WITH LONG-TERM CURRENT USE OF INSULIN: ICD-10-CM

## 2019-04-17 DIAGNOSIS — E66.01 MORBID OBESITY WITH BMI OF 40.0-44.9, ADULT: ICD-10-CM

## 2019-04-17 DIAGNOSIS — E11.9 TYPE 2 DIABETES MELLITUS WITHOUT COMPLICATION, WITH LONG-TERM CURRENT USE OF INSULIN: ICD-10-CM

## 2019-04-17 DIAGNOSIS — E78.5 DYSLIPIDEMIA: ICD-10-CM

## 2019-04-17 LAB
ALBUMIN/CREAT UR: 4 MCG/MG CREAT
CREAT UR-MCNC: 150 MG/DL (ref 20–320)
HBA1C MFR BLD: 9.5 % OF TOTAL HGB
MICROALBUMIN UR-MCNC: 0.6 MG/DL

## 2019-04-17 PROCEDURE — 3046F HEMOGLOBIN A1C LEVEL >9.0%: CPT | Mod: CPTII,S$GLB,, | Performed by: NURSE PRACTITIONER

## 2019-04-17 PROCEDURE — 99214 PR OFFICE/OUTPT VISIT, EST, LEVL IV, 30-39 MIN: ICD-10-PCS | Mod: S$GLB,,, | Performed by: NURSE PRACTITIONER

## 2019-04-17 PROCEDURE — 3008F BODY MASS INDEX DOCD: CPT | Mod: CPTII,S$GLB,, | Performed by: NURSE PRACTITIONER

## 2019-04-17 PROCEDURE — 3080F PR MOST RECENT DIASTOLIC BLOOD PRESSURE >= 90 MM HG: ICD-10-PCS | Mod: CPTII,S$GLB,, | Performed by: NURSE PRACTITIONER

## 2019-04-17 PROCEDURE — 99999 PR PBB SHADOW E&M-EST. PATIENT-LVL V: CPT | Mod: PBBFAC,,, | Performed by: NURSE PRACTITIONER

## 2019-04-17 PROCEDURE — 3046F PR MOST RECENT HEMOGLOBIN A1C LEVEL > 9.0%: ICD-10-PCS | Mod: CPTII,S$GLB,, | Performed by: NURSE PRACTITIONER

## 2019-04-17 PROCEDURE — 3008F PR BODY MASS INDEX (BMI) DOCUMENTED: ICD-10-PCS | Mod: CPTII,S$GLB,, | Performed by: NURSE PRACTITIONER

## 2019-04-17 PROCEDURE — 3077F PR MOST RECENT SYSTOLIC BLOOD PRESSURE >= 140 MM HG: ICD-10-PCS | Mod: CPTII,S$GLB,, | Performed by: NURSE PRACTITIONER

## 2019-04-17 PROCEDURE — 3077F SYST BP >= 140 MM HG: CPT | Mod: CPTII,S$GLB,, | Performed by: NURSE PRACTITIONER

## 2019-04-17 PROCEDURE — 99999 PR PBB SHADOW E&M-EST. PATIENT-LVL V: ICD-10-PCS | Mod: PBBFAC,,, | Performed by: NURSE PRACTITIONER

## 2019-04-17 PROCEDURE — 99214 OFFICE O/P EST MOD 30 MIN: CPT | Mod: S$GLB,,, | Performed by: NURSE PRACTITIONER

## 2019-04-17 PROCEDURE — 3080F DIAST BP >= 90 MM HG: CPT | Mod: CPTII,S$GLB,, | Performed by: NURSE PRACTITIONER

## 2019-04-17 RX ORDER — INSULIN PUMP SYRINGE, 3 ML
EACH MISCELLANEOUS
Qty: 1 EACH | Refills: 0 | Status: SHIPPED | OUTPATIENT
Start: 2019-04-17 | End: 2020-12-07 | Stop reason: SDUPTHER

## 2019-04-17 RX ORDER — FENOFIBRATE 54 MG/1
TABLET ORAL
Qty: 90 TABLET | Refills: 0 | Status: SHIPPED | OUTPATIENT
Start: 2019-04-17 | End: 2020-11-25

## 2019-04-17 RX ORDER — METFORMIN HYDROCHLORIDE 500 MG/1
500 TABLET, EXTENDED RELEASE ORAL 2 TIMES DAILY WITH MEALS
Qty: 180 TABLET | Refills: 1 | Status: SHIPPED | OUTPATIENT
Start: 2019-04-17 | End: 2020-01-15 | Stop reason: SDUPTHER

## 2019-04-17 RX ORDER — INSULIN LISPRO 100 [IU]/ML
18 INJECTION, SOLUTION INTRAVENOUS; SUBCUTANEOUS
Qty: 2 BOX | Refills: 6 | Status: SHIPPED | OUTPATIENT
Start: 2019-04-17 | End: 2020-12-07 | Stop reason: SDUPTHER

## 2019-04-17 RX ORDER — PEN NEEDLE, DIABETIC 29 G X1/2"
NEEDLE, DISPOSABLE MISCELLANEOUS
Qty: 150 EACH | Refills: 6 | Status: SHIPPED | OUTPATIENT
Start: 2019-04-17 | End: 2020-12-07 | Stop reason: SDUPTHER

## 2019-04-17 RX ORDER — INSULIN GLARGINE 100 [IU]/ML
INJECTION, SOLUTION SUBCUTANEOUS
Qty: 15 ML | Refills: 6 | Status: SHIPPED | OUTPATIENT
Start: 2019-04-17 | End: 2020-01-15 | Stop reason: SDUPTHER

## 2019-04-17 RX ORDER — LANCETS
EACH MISCELLANEOUS
Qty: 200 EACH | Refills: 3 | Status: SHIPPED | OUTPATIENT
Start: 2019-04-17

## 2019-04-17 RX ORDER — ATORVASTATIN CALCIUM 40 MG/1
TABLET, FILM COATED ORAL
Qty: 90 TABLET | Refills: 2 | Status: SHIPPED | OUTPATIENT
Start: 2019-04-17 | End: 2020-11-25 | Stop reason: SDUPTHER

## 2019-04-17 RX ORDER — ENALAPRIL MALEATE 20 MG/1
20 TABLET ORAL DAILY
Qty: 90 TABLET | Refills: 3 | Status: SHIPPED | OUTPATIENT
Start: 2019-04-17 | End: 2020-11-25 | Stop reason: SDUPTHER

## 2019-04-17 NOTE — PROGRESS NOTES
Subjective:       Patient ID: Dre Short is a 49 y.o. male.    Chief Complaint: Hyperlipidemia; Diabetes; and Hypertension    Here today for labs and check up   HLD- on lipitor  DM 2  Per Endo last note 6-2018- not seen since that time    increase Victoza to 1.8 mg daily and take in AM, before first meal.  Can consider Ozempic in the future.   -- increase Humalog to 18 units and continue correction scale. Explained to pt that insulin may be stored at room temp for 28 days. Should keep unused pens refrigerated.   -- continue Lantus 50 units QHS for now.   -- will trial metformin  mg with dinner. If tolerating well, increase to BID after 1 week.\      [facility] clinic in [place]    He feels that the hardest part is diet and exercise   He has neuropathy 2/2 to high sugars   He is working with podiatry     Diabetes   He has type 2 diabetes mellitus. No MedicAlert identification noted. The initial diagnosis of diabetes was made 11 years ago. Pertinent negatives for hypoglycemia include no confusion, dizziness, headaches, hunger, mood changes, nervousness/anxiousness, pallor, seizures, sleepiness, speech difficulty, sweats or tremors. Associated symptoms include foot paresthesias, polydipsia and polyuria. Pertinent negatives for diabetes include no blurred vision, no chest pain, no fatigue, no foot ulcerations, no polyphagia, no visual change, no weakness and no weight loss. Pertinent negatives for hypoglycemia complications include no blackouts, no hospitalization, no nocturnal hypoglycemia, no required assistance and no required glucagon injection. Symptoms are stable. Diabetic complications include autonomic neuropathy, impotence and peripheral neuropathy. Pertinent negatives for diabetic complications include no CVA, heart disease, nephropathy, PVD or retinopathy. Risk factors for coronary artery disease include hypertension, obesity, diabetes mellitus and male sex. Current diabetic treatment  includes insulin injections and oral agent (dual therapy). He is compliant with treatment most of the time. He is currently taking insulin at bedtime. Insulin injections are given by patient. Rotation sites for injection include the abdominal wall. His weight is stable. He is following a generally unhealthy diet. When asked about meal planning, he reported none. He has had a previous visit with a dietitian. He participates in exercise three times a week. He monitors blood glucose at home 1-2 x per week. Blood glucose monitoring compliance is inadequate. His home blood glucose trend is fluctuating dramatically. He sees a podiatrist.Eye exam is current.     Vitals:    04/17/19 0658   BP: (!) 158/98   Pulse: 100     Review of Systems   Constitutional: Negative.  Negative for fatigue, fever and weight loss.   HENT: Negative.    Eyes: Negative.  Negative for blurred vision, discharge and itching.   Respiratory: Negative.  Negative for cough, choking, chest tightness and shortness of breath.    Cardiovascular: Negative.  Negative for chest pain.   Gastrointestinal: Negative.  Negative for abdominal pain, diarrhea and nausea.   Endocrine: Positive for polydipsia and polyuria. Negative for polyphagia.   Genitourinary: Positive for impotence. Negative for difficulty urinating, dysuria, enuresis, frequency and hematuria.   Musculoskeletal: Negative.    Skin: Negative.  Negative for color change, pallor and rash.   Allergic/Immunologic: Negative.    Neurological: Negative.  Negative for dizziness, tremors, seizures, speech difficulty, weakness, numbness and headaches.   Hematological: Negative.    Psychiatric/Behavioral: Negative.  Negative for confusion. The patient is not nervous/anxious.        Past Medical History:   Diagnosis Date    Colon polyps 7/3/2012    Combined hyperlipidemia associated with type 2 diabetes mellitus     Diabetes mellitus type II, uncontrolled     Diverticulitis of colon 7/3/2012    Fatty liver      Hyperlipidemia     Hypertension associated with diabetes     Sleep apnea     compliant with CPAP       Objective:      Physical Exam   Constitutional: He is oriented to person, place, and time. He appears well-developed and well-nourished.   HENT:   Head: Normocephalic and atraumatic.   Right Ear: Hearing, tympanic membrane, external ear and ear canal normal.   Left Ear: Hearing, tympanic membrane, external ear and ear canal normal.   Nose: Nose normal. No mucosal edema or rhinorrhea. Right sinus exhibits no maxillary sinus tenderness and no frontal sinus tenderness. Left sinus exhibits no maxillary sinus tenderness and no frontal sinus tenderness.   Mouth/Throat: Uvula is midline, oropharynx is clear and moist and mucous membranes are normal.   Eyes: Pupils are equal, round, and reactive to light. Conjunctivae and EOM are normal.   Neck: Normal range of motion. Neck supple.   Cardiovascular: Normal rate, regular rhythm, normal heart sounds and intact distal pulses. Exam reveals no friction rub.   No murmur heard.  Pulmonary/Chest: Effort normal and breath sounds normal. No stridor. No respiratory distress. He has no wheezes.   Abdominal: Soft. Bowel sounds are normal.   Musculoskeletal: Normal range of motion. He exhibits no edema.   Neurological: He is alert and oriented to person, place, and time. No cranial nerve deficit.   Skin: Skin is warm and dry.   Psychiatric: He has a normal mood and affect. His behavior is normal. Judgment and thought content normal.   Nursing note and vitals reviewed.      Assessment:       1. 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 Combined hyperlipidemia associated with type 2 diabetes mellitus    3. Hypertension associated with diabetes    4. Morbid obesity with BMI of 40.0-44.9, adult    5. Non-proliferative diabetic retinopathy, both eyes    6. Screening for prostate cancer    7. Type 2 diabetes mellitus without complication, with  long-term current use of insulin    8. 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  Dispense: 9 mL; Refill: 4    Combined hyperlipidemia associated with type 2 diabetes mellitus  -    -     fenofibrate (TRICOR) 54 MG tablet; TAKE 1 TABLET(54 MG) BY MOUTH EVERY DAY  Dispense: 90 tablet; Refill: 0  -     PSA, Screening; Future; Expected date: 04/17/2019    Hypertension associated with diabetes  -     -     Comprehensive metabolic panel; Future; Expected date: 04/17/2019     enalapril (VASOTEC) 20 MG tablet; Take 1 tablet (20 mg total) by mouth once daily.  Dispense: 90 tablet; Refill: 3  Morbid obesity with BMI of 40.0-44.9, adult  -      Non-proliferative diabetic retinopathy, both eyes  -    Screening for prostate cancer  -  -     PSA, Screening; Future; Expected date: 04/17/2019    Type 2 diabetes mellitus without complication, with long-term current use of insulin  -     insulin (LANTUS SOLOSTAR U-100 INSULIN) glargine 100 units/mL (3mL) SubQ pen; INJECT 50 UNITS INTO THE SKIN ONCE DAILY  Dispense: 15 mL; Refill: 6  -     metFORMIN (GLUCOPHAGE-XR) 500 MG 24 hr tablet; Take 1 tablet (500 mg total) by mouth 2 (two) times daily with meals.  Dispense: 180 tablet; Refill: 1  -     semaglutide (OZEMPIC) 1 mg/0.75 mL (2 mg/1.5 mL) PnIj; Inject 0.75 mLs into the skin every 7 days.  Dispense: 9 mL; Refill: 4  -     insulin lispro 100 unit/mL pen; Inject 18 Units into the skin 3 (three)  "times daily before meals. Plus correction scale, max TDD 84 units  Dispense: 2 Box; Refill: 6  -     pen needle, diabetic (BD ULTRA-FINE ORIG PEN NEEDLE) 29 gauge x 1/2" Ndle; USE FIVE TIMES DAILY AS DIRECTED  Dispense: 150 each; Refill: 6    Dyslipidemia  -     fenofibrate (TRICOR) 54 MG tablet; TAKE 1 TABLET(54 MG) BY MOUTH EVERY DAY  Dispense: 90 tablet; Refill: 0  -     atorvastatin (LIPITOR) 40 MG tablet; TAKE 1 TABLET(40 MG) BY MOUTH EVERY DAY  Dispense: 90 tablet; Refill: 2  -     Lipid panel; Future; Expected date: 04/17/2019               Discussed exercise and eating right   He states that he will work on this  I will give him 3 months   I do not have his hgba1c in my possession - it was ordered at [facility] - I called for it and they were to fax this but I have not gotten this  I ordered extra labs and will call him about all these results     Follow up in about 3 months (around 7/17/2019).        "

## 2019-04-18 ENCOUNTER — PATIENT MESSAGE (OUTPATIENT)
Dept: FAMILY MEDICINE | Facility: CLINIC | Age: 49
End: 2019-04-18

## 2019-04-18 ENCOUNTER — DOCUMENTATION ONLY (OUTPATIENT)
Dept: FAMILY MEDICINE | Facility: CLINIC | Age: 49
End: 2019-04-18

## 2019-04-18 DIAGNOSIS — R79.89 ELEVATED LFTS: Primary | ICD-10-CM

## 2019-04-18 LAB
ALBUMIN SERPL-MCNC: 4 G/DL (ref 3.6–5.1)
ALBUMIN/GLOB SERPL: 1.2 (CALC) (ref 1–2.5)
ALP SERPL-CCNC: 74 U/L (ref 40–115)
ALT SERPL-CCNC: 43 U/L (ref 9–46)
AST SERPL-CCNC: 47 U/L (ref 10–35)
BILIRUB SERPL-MCNC: 0.8 MG/DL (ref 0.2–1.2)
BUN SERPL-MCNC: 18 MG/DL (ref 7–25)
BUN/CREAT SERPL: ABNORMAL (CALC) (ref 6–22)
CALCIUM SERPL-MCNC: 9.6 MG/DL (ref 8.6–10.3)
CHLORIDE SERPL-SCNC: 104 MMOL/L (ref 98–110)
CHOLEST SERPL-MCNC: 135 MG/DL
CHOLEST/HDLC SERPL: 3.6 (CALC)
CO2 SERPL-SCNC: 30 MMOL/L (ref 20–32)
CREAT SERPL-MCNC: 0.9 MG/DL (ref 0.7–1.33)
GFRSERPLBLD MDRD-ARVRAT: 99 ML/MIN/1.73M2
GLOBULIN SER CALC-MCNC: 3.3 G/DL (CALC) (ref 1.9–3.7)
GLUCOSE SERPL-MCNC: 220 MG/DL (ref 65–99)
HDLC SERPL-MCNC: 37 MG/DL
LDLC SERPL CALC-MCNC: 77 MG/DL (CALC)
NONHDLC SERPL-MCNC: 98 MG/DL (CALC)
POTASSIUM SERPL-SCNC: 5 MMOL/L (ref 3.5–5.3)
PROT SERPL-MCNC: 7.3 G/DL (ref 6.1–8.1)
PSA SERPL-MCNC: 0.2 NG/ML
SODIUM SERPL-SCNC: 140 MMOL/L (ref 135–146)
TRIGL SERPL-MCNC: 124 MG/DL

## 2019-04-18 RX ORDER — CIPROFLOXACIN 500 MG/1
500 TABLET ORAL EVERY 12 HOURS
Qty: 20 TABLET | Refills: 0 | Status: SHIPPED | OUTPATIENT
Start: 2019-04-18 | End: 2020-01-15 | Stop reason: ALTCHOICE

## 2019-04-18 NOTE — PROGRESS NOTES
PA initiated for Ozempic   CMM: [id number]  [employer]  ---------------------------------------------  4/22/19: DENIED  Medication is NOT COVERED

## 2019-04-22 ENCOUNTER — TELEPHONE (OUTPATIENT)
Dept: FAMILY MEDICINE | Facility: CLINIC | Age: 49
End: 2019-04-22

## 2019-07-12 ENCOUNTER — PATIENT MESSAGE (OUTPATIENT)
Dept: FAMILY MEDICINE | Facility: CLINIC | Age: 49
End: 2019-07-12

## 2019-07-12 DIAGNOSIS — Z79.4 TYPE 2 DIABETES MELLITUS WITHOUT COMPLICATION, WITH LONG-TERM CURRENT USE OF INSULIN: ICD-10-CM

## 2019-07-12 DIAGNOSIS — E11.9 TYPE 2 DIABETES MELLITUS WITHOUT COMPLICATION, WITH LONG-TERM CURRENT USE OF INSULIN: ICD-10-CM

## 2019-08-14 ENCOUNTER — OFFICE VISIT (OUTPATIENT)
Dept: PODIATRY | Facility: CLINIC | Age: 49
End: 2019-08-14
Payer: COMMERCIAL

## 2019-08-14 VITALS
HEART RATE: 75 BPM | BODY MASS INDEX: 40.43 KG/M2 | SYSTOLIC BLOOD PRESSURE: 139 MMHG | HEIGHT: 74 IN | WEIGHT: 315 LBS | DIASTOLIC BLOOD PRESSURE: 82 MMHG

## 2019-08-14 DIAGNOSIS — E11.51 TYPE II DIABETES MELLITUS WITH PERIPHERAL CIRCULATORY DISORDER: ICD-10-CM

## 2019-08-14 DIAGNOSIS — E11.49 TYPE II DIABETES MELLITUS WITH NEUROLOGICAL MANIFESTATIONS: Primary | ICD-10-CM

## 2019-08-14 DIAGNOSIS — E66.01 MORBID OBESITY WITH BMI OF 40.0-44.9, ADULT: ICD-10-CM

## 2019-08-14 PROCEDURE — 3079F PR MOST RECENT DIASTOLIC BLOOD PRESSURE 80-89 MM HG: ICD-10-PCS | Mod: CPTII,S$GLB,, | Performed by: PODIATRIST

## 2019-08-14 PROCEDURE — 99213 OFFICE O/P EST LOW 20 MIN: CPT | Mod: S$GLB,,, | Performed by: PODIATRIST

## 2019-08-14 PROCEDURE — 99213 PR OFFICE/OUTPT VISIT, EST, LEVL III, 20-29 MIN: ICD-10-PCS | Mod: S$GLB,,, | Performed by: PODIATRIST

## 2019-08-14 PROCEDURE — 99999 PR PBB SHADOW E&M-EST. PATIENT-LVL III: CPT | Mod: PBBFAC,,, | Performed by: PODIATRIST

## 2019-08-14 PROCEDURE — 3008F PR BODY MASS INDEX (BMI) DOCUMENTED: ICD-10-PCS | Mod: CPTII,S$GLB,, | Performed by: PODIATRIST

## 2019-08-14 PROCEDURE — 3008F BODY MASS INDEX DOCD: CPT | Mod: CPTII,S$GLB,, | Performed by: PODIATRIST

## 2019-08-14 PROCEDURE — 3046F HEMOGLOBIN A1C LEVEL >9.0%: CPT | Mod: CPTII,S$GLB,, | Performed by: PODIATRIST

## 2019-08-14 PROCEDURE — 99999 PR PBB SHADOW E&M-EST. PATIENT-LVL III: ICD-10-PCS | Mod: PBBFAC,,, | Performed by: PODIATRIST

## 2019-08-14 PROCEDURE — 3079F DIAST BP 80-89 MM HG: CPT | Mod: CPTII,S$GLB,, | Performed by: PODIATRIST

## 2019-08-14 PROCEDURE — 3075F PR MOST RECENT SYSTOLIC BLOOD PRESS GE 130-139MM HG: ICD-10-PCS | Mod: CPTII,S$GLB,, | Performed by: PODIATRIST

## 2019-08-14 PROCEDURE — 3075F SYST BP GE 130 - 139MM HG: CPT | Mod: CPTII,S$GLB,, | Performed by: PODIATRIST

## 2019-08-14 PROCEDURE — 3046F PR MOST RECENT HEMOGLOBIN A1C LEVEL > 9.0%: ICD-10-PCS | Mod: CPTII,S$GLB,, | Performed by: PODIATRIST

## 2019-08-14 RX ORDER — GABAPENTIN 300 MG/1
600 CAPSULE ORAL 2 TIMES DAILY
Qty: 360 CAPSULE | Refills: 3 | Status: SHIPPED | OUTPATIENT
Start: 2019-08-14 | End: 2019-08-26

## 2019-08-14 RX ORDER — AMOXICILLIN 500 MG/1
CAPSULE ORAL
Refills: 0 | COMMUNITY
Start: 2019-07-03 | End: 2020-01-15 | Stop reason: ALTCHOICE

## 2019-08-14 RX ORDER — HYDROCODONE BITARTRATE AND ACETAMINOPHEN 7.5; 325 MG/1; MG/1
TABLET ORAL
Refills: 0 | COMMUNITY
Start: 2019-07-10 | End: 2022-07-10

## 2019-08-14 NOTE — PROGRESS NOTES
Subjective:      Patient ID: Dre Short is a 49 y.o. male.    Chief Complaint: Diabetes Mellitus (PCP:  Dr Peñaloza (S Primitivo NP) 4/17/19; HgbA1c: 4/16/19  9.5) and Follow-up (6 mo ck)    Dre is a 49 y.o. male who presents to the clinic for annual evaluation and treatment of diabetic feet. Dre has a past medical history of Colon polyps (7/3/2012), Combined hyperlipidemia associated with type 2 diabetes mellitus, Diabetes mellitus type II, uncontrolled, Diverticulitis of colon (7/3/2012), Fatty liver, Hyperlipidemia, Hypertension associated with diabetes, and Sleep apnea. Patient relates pain in the feet is improved on gabapentin, notices when he sometimes takes 2 in the afternoon it is even better. The plantar foot pain is better with the improved shoes he got as suggested.      PCP: Scott Peñaloza MD    Date Last Seen by PCP: 4/17/19    Current shoe gear: Casual shoes    Hemoglobin A1C   Date Value Ref Range Status   04/16/2019 9.5 (H) <5.7 % of total Hgb Final     Comment:     For someone without known diabetes, a hemoglobin A1c  value of 6.5% or greater indicates that they may have   diabetes and this should be confirmed with a follow-up   test.     For someone with known diabetes, a value <7% indicates   that their diabetes is well controlled and a value   greater than or equal to 7% indicates suboptimal   control. A1c targets should be individualized based on   duration of diabetes, age, comorbid conditions, and   other considerations.     Currently, no consensus exists regarding use of  hemoglobin A1c for diagnosis of diabetes for children.         11/03/2017 10.7 (H) 4.0 - 5.6 % Final     Comment:     According to ADA guidelines, hemoglobin A1c <7.0% represents  optimal control in non-pregnant diabetic patients. Different  metrics may apply to specific patient populations.   Standards of Medical Care in Diabetes-2016.  For the purpose of screening for the presence of diabetes:  <5.7%     Consistent  with the absence of diabetes  5.7-6.4%  Consistent with increasing risk for diabetes   (prediabetes)  >or=6.5%  Consistent with diabetes  Currently, no consensus exists for use of hemoglobin A1c  for diagnosis of diabetes for children.  This Hemoglobin A1c assay has significant interference with fetal   hemoglobin   (HbF). The results are invalid for patients with abnormal amounts of   HbF,   including those with known Hereditary Persistence   of Fetal Hemoglobin. Heterozygous hemoglobin variants (HbAS, HbAC,   HbAD, HbAE, HbA2) do not significantly interfere with this assay;   however, presence of multiple variants in a sample may impact the %   interference.     06/14/2017 12.6 (H) 4.0 - 5.6 % Final     Comment:     According to ADA guidelines, hemoglobin A1c <7.0% represents  optimal control in non-pregnant diabetic patients. Different  metrics may apply to specific patient populations.   Standards of Medical Care in Diabetes-2016.  For the purpose of screening for the presence of diabetes:  <5.7%     Consistent with the absence of diabetes  5.7-6.4%  Consistent with increasing risk for diabetes   (prediabetes)  >or=6.5%  Consistent with diabetes  Currently, no consensus exists for use of hemoglobin A1c  for diagnosis of diabetes for children.  This Hemoglobin A1c assay has significant interference with fetal   hemoglobin   (HbF). The results are invalid for patients with abnormal amounts of   HbF,   including those with known Hereditary Persistence   of Fetal Hemoglobin. Heterozygous hemoglobin variants (HbAS, HbAC,   HbAD, HbAE, HbA2) do not significantly interfere with this assay;   however, presence of multiple variants in a sample may impact the %   interference.             Review of Systems   Constitution: Negative for chills and fever.   Cardiovascular: Positive for leg swelling. Negative for claudication.   Respiratory: Negative for shortness of breath.    Skin: Positive for nail changes. Negative for  itching and rash.   Musculoskeletal: Positive for arthritis. Negative for muscle cramps, muscle weakness and myalgias.   Gastrointestinal: Negative for nausea and vomiting.   Neurological: Positive for numbness and paresthesias. Negative for focal weakness and loss of balance.           Objective:      Physical Exam   Constitutional: He is oriented to person, place, and time. He appears well-developed and well-nourished. No distress.   Cardiovascular:   Pulses:       Dorsalis pedis pulses are 2+ on the right side, and 2+ on the left side.        Posterior tibial pulses are 2+ on the right side, and 2+ on the left side.   < 3 sec capillary refill time to toes 1-5 bilateral. Feet are warm to touch proximally with distal cooling b/l. There is no hair growth on the feet and toes b/l. There is moderate edema b/l. No spider veins or varicosities present b/l.      Musculoskeletal:   Resolved discomfort to palpation at the plantar metatarsal heads area.    Equinus noted b/l ankles with < 10 deg DF noted. MMT 5/5 in DF/PF/Inv/Ev resistance with no reproduction of pain in any direction. Passive range of motion of ankle and pedal joints is painless b/l.     Neurological: He is alert and oriented to person, place, and time. He has normal strength. He displays no atrophy and no tremor. A sensory deficit is present. He exhibits normal muscle tone.   Negative tinel sign bilateral. Diminished vibratory and protective sensation bilateral.   Skin: Skin is warm, dry and intact. No abrasion, no bruising, no burn, no ecchymosis, no laceration, no lesion, no petechiae and no rash noted. He is not diaphoretic. No cyanosis or erythema. No pallor. Nails show no clubbing.   Skin temperature, texture and turgor within normal limits.       Psychiatric: He has a normal mood and affect. His behavior is normal.             Assessment:       Encounter Diagnoses   Name Primary?    Type II diabetes mellitus with neurological manifestations Yes     Type II diabetes mellitus with peripheral circulatory disorder     Morbid obesity with BMI of 40.0-44.9, adult          Plan:       Dre was seen today for diabetes mellitus and follow-up.    Diagnoses and all orders for this visit:    Type II diabetes mellitus with neurological manifestations    Type II diabetes mellitus with peripheral circulatory disorder    Morbid obesity with BMI of 40.0-44.9, adult    Other orders  -     gabapentin (NEURONTIN) 300 MG capsule; Take 2 capsules (600 mg total) by mouth 2 (two) times daily. Take 1 tab before bed for 3 weeks then take 1 tab twice daily.      I counseled the patient on his conditions, their implications and medical management.    Shoe inspection. Diabetic Foot Education. Patient reminded of the importance of good nutrition and blood sugar control to help prevent podiatric complications of diabetes. Patient instructed on proper foot hygeine. We discussed wearing proper shoe gear, daily foot inspections, never walking without protective shoe gear, never putting sharp instruments to feet    Increase gabapentin 600 mg twice daily.    Discussed importance of healthy diet and weight loss as to his diabetes control.    Patient will wear the over the counter arch supports and wear them in shoes whenever possible.  Athletic shoes intended for walking or running are usually best.    Return in 1 year follow up diabetic foot check sooner TIANNA Shirley DPM

## 2019-08-15 ENCOUNTER — TELEPHONE (OUTPATIENT)
Dept: PODIATRY | Facility: CLINIC | Age: 49
End: 2019-08-15

## 2019-08-15 NOTE — TELEPHONE ENCOUNTER
Left message at [facility]: as below      Alfredo Shirley, RUSSEL Camilo LPN   Caller: Unspecified (Today,  7:59 AM)             Sorry about that it is only supposed to say take 2 tablets twice daily

## 2019-08-15 NOTE — TELEPHONE ENCOUNTER
Sheron stating that there are 2 sets of directions on the Gabapentin 300 mg capsules.  Askling for clarification.    Sheron - [phone]

## 2019-08-26 ENCOUNTER — PATIENT MESSAGE (OUTPATIENT)
Dept: PODIATRY | Facility: CLINIC | Age: 49
End: 2019-08-26

## 2019-08-26 RX ORDER — GABAPENTIN 300 MG/1
600 CAPSULE ORAL 2 TIMES DAILY
Qty: 360 CAPSULE | Refills: 3 | Status: SHIPPED | OUTPATIENT
Start: 2019-08-26 | End: 2020-11-09 | Stop reason: SDUPTHER

## 2019-09-14 ENCOUNTER — PATIENT OUTREACH (OUTPATIENT)
Dept: ADMINISTRATIVE | Facility: HOSPITAL | Age: 49
End: 2019-09-14

## 2019-09-14 NOTE — LETTER
September 23, 2019    Dre Short  [address] [place] [address]             [facility]  [address]  [place] [address]  Phone: [phone] Dear Mr. Short:    [facility] is committed to your overall health and would like to ensure that you are up to date on your recommended test and/or procedures.   Scott Peñaloza MD has found that your chart shows you may be due for the following:     Eye Exam, yearly   Hemoglobin A1c, order placed     If you have had any of the above done at another facility, please let us know so that we may obtain copies from that facility.  If you have a copy of these records, please provide a copy for us to scan into your chart.  You are welcome to request that the report be faxed to us at  ([phone]).       If you have an upcoming scheduled appointment for the above test and/or procedures, please disregard this letter. Otherwise, please schedule these appointments at your earliest convenience by calling [phone] or going to [email or web address].       Thank you for letting us care for you,         Melony Bolanos, Care Coordinator   [facility]   Phone: [phone]   Fax: [phone]

## 2019-09-14 NOTE — LETTER
AUTHORIZATION FOR RELEASE OF   CONFIDENTIAL INFORMATION    Dear Dr. Jin and assoc.,    We are seeing Dre Short, date of birth 1970, in the clinic at [facility]. Scott Peñaloza MD is the patient's PCP. Dre Short has an outstanding lab/procedure at the time we reviewed his chart. In order to help keep his health information updated, he has authorized us to request the following medical record(s):       EYE EXAM                     Please fax records to [facility], Troy J Davis, MD,  [phone]     If you have any questions, please contact Melony Bolanos, Care Coordinator   at [phone].            Patient Name: Dre Short  : 1970  Patient Phone #: [phone]

## 2019-10-14 ENCOUNTER — PATIENT OUTREACH (OUTPATIENT)
Dept: ADMINISTRATIVE | Facility: HOSPITAL | Age: 49
End: 2019-10-14

## 2019-10-14 NOTE — LETTER
AUTHORIZATION FOR RELEASE OF   CONFIDENTIAL INFORMATION    John Jin MD    We are seeing Dre Short, date of birth 1970, in the clinic at [facility]. Scott Peñaloza MD is the patient's PCP. Dre Short has an outstanding lab/procedure at the time we reviewed his chart. In order to help keep his health information updated, he has authorized us to request the following medical record(s):       EYE EXAM    2nd request         Please fax records to [facility], Troy J Davis, MD,  [phone]     If you have any questions, please contact Melony Bolanos, Care Coordinator   at [phone].            Patient Name: Dre Short  : 1970  Patient Phone #: [phone]

## 2019-11-17 DIAGNOSIS — Z79.4 TYPE 2 DIABETES MELLITUS WITHOUT COMPLICATION, WITH LONG-TERM CURRENT USE OF INSULIN: ICD-10-CM

## 2019-11-17 DIAGNOSIS — E11.9 TYPE 2 DIABETES MELLITUS WITHOUT COMPLICATION, WITH LONG-TERM CURRENT USE OF INSULIN: ICD-10-CM

## 2019-11-20 RX ORDER — METFORMIN HYDROCHLORIDE 500 MG/1
TABLET, EXTENDED RELEASE ORAL
Qty: 180 TABLET | Refills: 0 | OUTPATIENT
Start: 2019-11-20

## 2019-11-20 NOTE — TELEPHONE ENCOUNTER
Needs appointment with ME in my next available regular appointment time slot (do not use any hold spots).  Refill to appointment please. Has not seen me in > 2 years.  Labs before

## 2019-11-23 ENCOUNTER — TELEPHONE (OUTPATIENT)
Dept: FAMILY MEDICINE | Facility: CLINIC | Age: 49
End: 2019-11-23

## 2019-12-16 ENCOUNTER — TELEPHONE (OUTPATIENT)
Dept: ADMINISTRATIVE | Facility: HOSPITAL | Age: 49
End: 2019-12-16

## 2019-12-16 DIAGNOSIS — E11.9 DIABETES MELLITUS WITHOUT COMPLICATION: Primary | ICD-10-CM

## 2019-12-16 NOTE — TELEPHONE ENCOUNTER
A1C was not listed in the pt's orders, Dr. Peñaloza's staff said they cannot put the order in themselves. Dr. Peñaloza has to put it in.

## 2019-12-17 ENCOUNTER — TELEPHONE (OUTPATIENT)
Dept: ADMINISTRATIVE | Facility: HOSPITAL | Age: 49
End: 2019-12-17

## 2020-01-06 ENCOUNTER — PATIENT MESSAGE (OUTPATIENT)
Dept: FAMILY MEDICINE | Facility: CLINIC | Age: 50
End: 2020-01-06

## 2020-01-07 DIAGNOSIS — R79.89 ELEVATED LFTS: Primary | ICD-10-CM

## 2020-01-07 DIAGNOSIS — E11.9 TYPE 2 DIABETES MELLITUS WITHOUT COMPLICATION, WITH LONG-TERM CURRENT USE OF INSULIN: ICD-10-CM

## 2020-01-07 DIAGNOSIS — Z79.4 TYPE 2 DIABETES MELLITUS WITHOUT COMPLICATION, WITH LONG-TERM CURRENT USE OF INSULIN: ICD-10-CM

## 2020-01-15 LAB — URATE SERPL-MCNC: 4.8 MG/DL (ref 4–8)

## 2020-01-22 ENCOUNTER — PATIENT OUTREACH (OUTPATIENT)
Dept: ADMINISTRATIVE | Facility: HOSPITAL | Age: 50
End: 2020-01-22

## 2020-01-28 RX ORDER — ENALAPRIL MALEATE 20 MG/1
TABLET ORAL
Qty: 90 TABLET | Refills: 3 | OUTPATIENT
Start: 2020-01-28

## 2020-01-28 NOTE — TELEPHONE ENCOUNTER
Needs appointment with ME.    Needs appointment with me in my next available regular appointment time slot (do not use any hold spots).  Refill to appointment please.   Not seen by me since 6/2017

## 2020-03-02 ENCOUNTER — PATIENT OUTREACH (OUTPATIENT)
Dept: ADMINISTRATIVE | Facility: HOSPITAL | Age: 50
End: 2020-03-02

## 2020-03-02 DIAGNOSIS — E11.9 DIABETES MELLITUS WITHOUT COMPLICATION: Primary | ICD-10-CM

## 2020-03-02 NOTE — PROGRESS NOTES
Chart review completed 03/02/2020.  Care Everywhere updates requested and reviewed.  Immunizations reconciled. Media reviewed.       WOG orders placed.

## 2020-03-03 ENCOUNTER — PATIENT OUTREACH (OUTPATIENT)
Dept: ADMINISTRATIVE | Facility: HOSPITAL | Age: 50
End: 2020-03-03

## 2020-04-06 ENCOUNTER — PATIENT OUTREACH (OUTPATIENT)
Dept: ADMINISTRATIVE | Facility: HOSPITAL | Age: 50
End: 2020-04-06

## 2020-04-06 NOTE — PROGRESS NOTES
Hemoglobin A1C non-compliant report.    Chart review completed 04/06/2020.  Care Everywhere updates requested and reviewed.  Immunizations reconciled. Media reviewed.     Last A1C 4/16/2019 (9.5) Needs labs and follow up.  Not in [facility].    Health Maintenance Due   Topic Date Due    Shingles Vaccine (1 of 2) 04/01/2019    Hemoglobin A1c  07/16/2019    Influenza Vaccine (1) 09/01/2019

## 2020-05-01 ENCOUNTER — PATIENT MESSAGE (OUTPATIENT)
Dept: PODIATRY | Facility: CLINIC | Age: 50
End: 2020-05-01

## 2020-05-01 RX ORDER — MUPIROCIN 20 MG/G
OINTMENT TOPICAL DAILY
Qty: 1 TUBE | Refills: 1 | Status: SHIPPED | OUTPATIENT
Start: 2020-05-01 | End: 2020-11-25

## 2020-05-04 ENCOUNTER — PATIENT OUTREACH (OUTPATIENT)
Dept: ADMINISTRATIVE | Facility: OTHER | Age: 50
End: 2020-05-04

## 2020-05-05 ENCOUNTER — OFFICE VISIT (OUTPATIENT)
Dept: PODIATRY | Facility: CLINIC | Age: 50
End: 2020-05-05
Payer: COMMERCIAL

## 2020-05-05 ENCOUNTER — PATIENT MESSAGE (OUTPATIENT)
Dept: ADMINISTRATIVE | Facility: HOSPITAL | Age: 50
End: 2020-05-05

## 2020-05-05 VITALS
DIASTOLIC BLOOD PRESSURE: 91 MMHG | TEMPERATURE: 98 F | WEIGHT: 315 LBS | HEART RATE: 96 BPM | HEIGHT: 74 IN | SYSTOLIC BLOOD PRESSURE: 137 MMHG | BODY MASS INDEX: 40.43 KG/M2

## 2020-05-05 DIAGNOSIS — E11.49 TYPE II DIABETES MELLITUS WITH NEUROLOGICAL MANIFESTATIONS: ICD-10-CM

## 2020-05-05 DIAGNOSIS — L97.322 ANKLE ULCER, LEFT, WITH FAT LAYER EXPOSED: Primary | ICD-10-CM

## 2020-05-05 DIAGNOSIS — E66.01 MORBID OBESITY WITH BMI OF 40.0-44.9, ADULT: ICD-10-CM

## 2020-05-05 DIAGNOSIS — L03.116 CELLULITIS OF LEFT ANKLE: ICD-10-CM

## 2020-05-05 DIAGNOSIS — E11.51 TYPE II DIABETES MELLITUS WITH PERIPHERAL CIRCULATORY DISORDER: ICD-10-CM

## 2020-05-05 PROCEDURE — 3008F PR BODY MASS INDEX (BMI) DOCUMENTED: ICD-10-PCS | Mod: CPTII,S$GLB,, | Performed by: PODIATRIST

## 2020-05-05 PROCEDURE — 3075F PR MOST RECENT SYSTOLIC BLOOD PRESS GE 130-139MM HG: ICD-10-PCS | Mod: CPTII,S$GLB,, | Performed by: PODIATRIST

## 2020-05-05 PROCEDURE — 3075F SYST BP GE 130 - 139MM HG: CPT | Mod: CPTII,S$GLB,, | Performed by: PODIATRIST

## 2020-05-05 PROCEDURE — 3008F BODY MASS INDEX DOCD: CPT | Mod: CPTII,S$GLB,, | Performed by: PODIATRIST

## 2020-05-05 PROCEDURE — 99999 PR PBB SHADOW E&M-EST. PATIENT-LVL III: CPT | Mod: PBBFAC,,, | Performed by: PODIATRIST

## 2020-05-05 PROCEDURE — 3080F PR MOST RECENT DIASTOLIC BLOOD PRESSURE >= 90 MM HG: ICD-10-PCS | Mod: CPTII,S$GLB,, | Performed by: PODIATRIST

## 2020-05-05 PROCEDURE — 87075 CULTR BACTERIA EXCEPT BLOOD: CPT

## 2020-05-05 PROCEDURE — 3080F DIAST BP >= 90 MM HG: CPT | Mod: CPTII,S$GLB,, | Performed by: PODIATRIST

## 2020-05-05 PROCEDURE — 99214 PR OFFICE/OUTPT VISIT, EST, LEVL IV, 30-39 MIN: ICD-10-PCS | Mod: 25,S$GLB,, | Performed by: PODIATRIST

## 2020-05-05 PROCEDURE — 11042 DBRDMT SUBQ TIS 1ST 20SQCM/<: CPT | Mod: LT,S$GLB,, | Performed by: PODIATRIST

## 2020-05-05 PROCEDURE — 11042 WOUND DEBRIDEMENT: ICD-10-PCS | Mod: LT,S$GLB,, | Performed by: PODIATRIST

## 2020-05-05 PROCEDURE — 87070 CULTURE OTHR SPECIMN AEROBIC: CPT

## 2020-05-05 PROCEDURE — 87147 CULTURE TYPE IMMUNOLOGIC: CPT

## 2020-05-05 PROCEDURE — 99214 OFFICE O/P EST MOD 30 MIN: CPT | Mod: 25,S$GLB,, | Performed by: PODIATRIST

## 2020-05-05 PROCEDURE — 99999 PR PBB SHADOW E&M-EST. PATIENT-LVL III: ICD-10-PCS | Mod: PBBFAC,,, | Performed by: PODIATRIST

## 2020-05-05 RX ORDER — CLINDAMYCIN HYDROCHLORIDE 300 MG/1
CAPSULE ORAL
COMMUNITY
Start: 2020-04-29 | End: 2020-05-11 | Stop reason: ALTCHOICE

## 2020-05-08 ENCOUNTER — PATIENT MESSAGE (OUTPATIENT)
Dept: PODIATRY | Facility: CLINIC | Age: 50
End: 2020-05-08

## 2020-05-08 LAB — BACTERIA SPEC AEROBE CULT: ABNORMAL

## 2020-05-09 ENCOUNTER — PATIENT MESSAGE (OUTPATIENT)
Dept: PODIATRY | Facility: CLINIC | Age: 50
End: 2020-05-09

## 2020-05-11 ENCOUNTER — PATIENT OUTREACH (OUTPATIENT)
Dept: ADMINISTRATIVE | Facility: OTHER | Age: 50
End: 2020-05-11

## 2020-05-11 LAB — BACTERIA SPEC ANAEROBE CULT: NORMAL

## 2020-05-11 RX ORDER — AMOXICILLIN AND CLAVULANATE POTASSIUM 875; 125 MG/1; MG/1
1 TABLET, FILM COATED ORAL 2 TIMES DAILY
Qty: 14 TABLET | Refills: 0 | Status: SHIPPED | OUTPATIENT
Start: 2020-05-11 | End: 2020-05-18

## 2020-05-12 ENCOUNTER — OFFICE VISIT (OUTPATIENT)
Dept: PODIATRY | Facility: CLINIC | Age: 50
End: 2020-05-12
Payer: COMMERCIAL

## 2020-05-12 VITALS
HEART RATE: 85 BPM | HEIGHT: 74 IN | TEMPERATURE: 98 F | BODY MASS INDEX: 40.43 KG/M2 | WEIGHT: 315 LBS | SYSTOLIC BLOOD PRESSURE: 121 MMHG | DIASTOLIC BLOOD PRESSURE: 79 MMHG

## 2020-05-12 DIAGNOSIS — L97.322 ANKLE ULCER, LEFT, WITH FAT LAYER EXPOSED: Primary | ICD-10-CM

## 2020-05-12 DIAGNOSIS — E11.51 TYPE II DIABETES MELLITUS WITH PERIPHERAL CIRCULATORY DISORDER: ICD-10-CM

## 2020-05-12 PROCEDURE — 99999 PR PBB SHADOW E&M-EST. PATIENT-LVL III: CPT | Mod: PBBFAC,,, | Performed by: PODIATRIST

## 2020-05-12 PROCEDURE — 11042 WOUND DEBRIDEMENT: ICD-10-PCS | Mod: LT,S$GLB,, | Performed by: PODIATRIST

## 2020-05-12 PROCEDURE — 11042 DBRDMT SUBQ TIS 1ST 20SQCM/<: CPT | Mod: LT,S$GLB,, | Performed by: PODIATRIST

## 2020-05-12 PROCEDURE — 99499 UNLISTED E&M SERVICE: CPT | Mod: S$GLB,,, | Performed by: PODIATRIST

## 2020-05-12 PROCEDURE — 99999 PR PBB SHADOW E&M-EST. PATIENT-LVL III: ICD-10-PCS | Mod: PBBFAC,,, | Performed by: PODIATRIST

## 2020-05-12 PROCEDURE — 99499 NO LOS: ICD-10-PCS | Mod: S$GLB,,, | Performed by: PODIATRIST

## 2020-05-18 ENCOUNTER — PATIENT OUTREACH (OUTPATIENT)
Dept: ADMINISTRATIVE | Facility: OTHER | Age: 50
End: 2020-05-18

## 2020-05-18 NOTE — PROGRESS NOTES
Subjective:      Patient ID: Dre Short is a 50 y.o. male.    Chief Complaint: Wound Check (left) and Foot Problem (swelling - left)    Dre is a 50 y.o. male who presents to the clinic for annual evaluation and treatment of diabetic feet. Dre has a past medical history of Colon polyps (7/3/2012), Combined hyperlipidemia associated with type 2 diabetes mellitus, Diabetes mellitus type II, uncontrolled, Diverticulitis of colon (7/3/2012), Fatty liver, Hyperlipidemia, Hypertension associated with diabetes, and Sleep apnea. Patient relates pain in the feet is improved on gabapentin, notices when he sometimes takes 2 in the afternoon it is even better. The plantar foot pain is better with the improved shoes he got as suggested.    5/5/20: Patient presents with cellulitis and new ulceration left lateral ankle. He has been on antibiotics the last week which has helped some with the redness. Here for continued wound care.      PCP: Scott Peñaloza MD    Date Last Seen by PCP: 4/17/19    Current shoe gear: Casual shoes    Hemoglobin A1C   Date Value Ref Range Status   04/16/2019 9.5 (H) <5.7 % of total Hgb Final     Comment:     For someone without known diabetes, a hemoglobin A1c  value of 6.5% or greater indicates that they may have   diabetes and this should be confirmed with a follow-up   test.     For someone with known diabetes, a value <7% indicates   that their diabetes is well controlled and a value   greater than or equal to 7% indicates suboptimal   control. A1c targets should be individualized based on   duration of diabetes, age, comorbid conditions, and   other considerations.     Currently, no consensus exists regarding use of  hemoglobin A1c for diagnosis of diabetes for children.         11/03/2017 10.7 (H) 4.0 - 5.6 % Final     Comment:     According to ADA guidelines, hemoglobin A1c <7.0% represents  optimal control in non-pregnant diabetic patients. Different  metrics may apply to specific patient  populations.   Standards of Medical Care in Diabetes-2016.  For the purpose of screening for the presence of diabetes:  <5.7%     Consistent with the absence of diabetes  5.7-6.4%  Consistent with increasing risk for diabetes   (prediabetes)  >or=6.5%  Consistent with diabetes  Currently, no consensus exists for use of hemoglobin A1c  for diagnosis of diabetes for children.  This Hemoglobin A1c assay has significant interference with fetal   hemoglobin   (HbF). The results are invalid for patients with abnormal amounts of   HbF,   including those with known Hereditary Persistence   of Fetal Hemoglobin. Heterozygous hemoglobin variants (HbAS, HbAC,   HbAD, HbAE, HbA2) do not significantly interfere with this assay;   however, presence of multiple variants in a sample may impact the %   interference.     06/14/2017 12.6 (H) 4.0 - 5.6 % Final     Comment:     According to ADA guidelines, hemoglobin A1c <7.0% represents  optimal control in non-pregnant diabetic patients. Different  metrics may apply to specific patient populations.   Standards of Medical Care in Diabetes-2016.  For the purpose of screening for the presence of diabetes:  <5.7%     Consistent with the absence of diabetes  5.7-6.4%  Consistent with increasing risk for diabetes   (prediabetes)  >or=6.5%  Consistent with diabetes  Currently, no consensus exists for use of hemoglobin A1c  for diagnosis of diabetes for children.  This Hemoglobin A1c assay has significant interference with fetal   hemoglobin   (HbF). The results are invalid for patients with abnormal amounts of   HbF,   including those with known Hereditary Persistence   of Fetal Hemoglobin. Heterozygous hemoglobin variants (HbAS, HbAC,   HbAD, HbAE, HbA2) do not significantly interfere with this assay;   however, presence of multiple variants in a sample may impact the %   interference.             Review of Systems   Constitution: Negative for chills and fever.   Cardiovascular: Positive for  leg swelling. Negative for claudication.   Respiratory: Negative for shortness of breath.    Skin: Positive for nail changes. Negative for itching and rash.   Musculoskeletal: Positive for arthritis. Negative for muscle cramps, muscle weakness and myalgias.   Gastrointestinal: Negative for nausea and vomiting.   Neurological: Positive for numbness and paresthesias. Negative for focal weakness and loss of balance.           Objective:      Physical Exam   Constitutional: He is oriented to person, place, and time. He appears well-developed and well-nourished. No distress.   Cardiovascular:   Pulses:       Dorsalis pedis pulses are 2+ on the right side, and 2+ on the left side.        Posterior tibial pulses are 2+ on the right side, and 2+ on the left side.   < 3 sec capillary refill time to toes 1-5 bilateral. Feet are warm to touch proximally with distal cooling b/l. There is no hair growth on the feet and toes b/l. There is moderate edema b/l. No spider veins or varicosities present b/l.      Musculoskeletal:   Resolved discomfort to palpation at the plantar metatarsal heads area.    Equinus noted b/l ankles with < 10 deg DF noted. MMT 5/5 in DF/PF/Inv/Ev resistance with no reproduction of pain in any direction. Passive range of motion of ankle and pedal joints is painless b/l.     Neurological: He is alert and oriented to person, place, and time. He has normal strength. He displays no atrophy and no tremor. A sensory deficit is present. He exhibits normal muscle tone.   Negative tinel sign bilateral. Diminished vibratory and protective sensation bilateral.   Skin: Skin is warm, dry and intact. No abrasion, no bruising, no burn, no ecchymosis, no laceration, no lesion, no petechiae and no rash noted. He is not diaphoretic. No cyanosis or erythema. No pallor. Nails show no clubbing.   Skin temperature, texture and turgor within normal limits.'    Ulcer Location: left lateral ankle over the malleolus  Measurements:  pre 0.2x0.2x0.2 cm, post: 1.8x1.0x0.2 cm  Periwound: Intact  Drainage: serosanguinous.  Pus: None.  Malodor: None.  Base:  100% granular. 0% fibrin.  Signs of infection: None.           Psychiatric: He has a normal mood and affect. His behavior is normal.             Assessment:       Encounter Diagnoses   Name Primary?    Ankle ulcer, left, with fat layer exposed Yes    Cellulitis of left ankle     Type II diabetes mellitus with neurological manifestations     Type II diabetes mellitus with peripheral circulatory disorder     Morbid obesity with BMI of 40.0-44.9, adult          Plan:       Dre was seen today for wound check and foot problem.    Diagnoses and all orders for this visit:    Ankle ulcer, left, with fat layer exposed  -     Aerobic culture  -     Culture, Anaerobic    Cellulitis of left ankle  -     Aerobic culture  -     Culture, Anaerobic    Type II diabetes mellitus with neurological manifestations    Type II diabetes mellitus with peripheral circulatory disorder    Morbid obesity with BMI of 40.0-44.9, adult      I counseled the patient on his conditions, their implications and medical management.    Shoe inspection. Diabetic Foot Education. Patient reminded of the importance of good nutrition and blood sugar control to help prevent podiatric complications of diabetes. Patient instructed on proper foot hygeine. We discussed wearing proper shoe gear, daily foot inspections, never walking without protective shoe gear, never putting sharp instruments to feet    Ulcer was debrided, cultures taken, dressed with iodosorb and aperture with modified football    Return in 1 week wound care    Alfredo Shirley DPM

## 2020-05-18 NOTE — PROCEDURES
"Wound Debridement  Date/Time: 5/5/2020 3:45 PM  Performed by: Alfredo Shirley DPM  Authorized by: Alfredo Shirley DPM     Time out: Immediately prior to procedure a "time out" was called to verify the correct patient, procedure, equipment, support staff and site/side marked as required.    Consent Done?:  Yes (Verbal)  Local anesthesia used?: No      Wound Details:    Location:  Left foot    Location:  Left Ankle    Type of Debridement:  Excisional       Length (cm):  1.9       Area (sq cm):  1.9       Width (cm):  1       Percent Debrided (%):  100       Depth (cm):  0.3       Total Area Debrided (sq cm):  1.9    Depth of debridement:  Subcutaneous tissue    Devitalized tissue debrided:  Biofilm, Callus, Slough and Necrotic/Eschar    Bleeding:  Minimal  Hemostasis Achieved: Yes    Method Used:  Pressure  Patient tolerance:  Patient tolerated the procedure well with no immediate complications      "

## 2020-05-19 ENCOUNTER — OFFICE VISIT (OUTPATIENT)
Dept: PODIATRY | Facility: CLINIC | Age: 50
End: 2020-05-19
Payer: COMMERCIAL

## 2020-05-19 VITALS
DIASTOLIC BLOOD PRESSURE: 81 MMHG | HEIGHT: 74 IN | SYSTOLIC BLOOD PRESSURE: 128 MMHG | WEIGHT: 315 LBS | BODY MASS INDEX: 40.43 KG/M2 | HEART RATE: 87 BPM | TEMPERATURE: 98 F

## 2020-05-19 DIAGNOSIS — L97.322 ANKLE ULCER, LEFT, WITH FAT LAYER EXPOSED: Primary | ICD-10-CM

## 2020-05-19 DIAGNOSIS — E11.51 TYPE II DIABETES MELLITUS WITH PERIPHERAL CIRCULATORY DISORDER: ICD-10-CM

## 2020-05-19 DIAGNOSIS — E11.49 TYPE II DIABETES MELLITUS WITH NEUROLOGICAL MANIFESTATIONS: ICD-10-CM

## 2020-05-19 PROCEDURE — 99499 UNLISTED E&M SERVICE: CPT | Mod: S$GLB,,, | Performed by: PODIATRIST

## 2020-05-19 PROCEDURE — 11042 WOUND DEBRIDEMENT: ICD-10-PCS | Mod: LT,S$GLB,, | Performed by: PODIATRIST

## 2020-05-19 PROCEDURE — 11042 DBRDMT SUBQ TIS 1ST 20SQCM/<: CPT | Mod: LT,S$GLB,, | Performed by: PODIATRIST

## 2020-05-19 PROCEDURE — 99499 NO LOS: ICD-10-PCS | Mod: S$GLB,,, | Performed by: PODIATRIST

## 2020-05-19 PROCEDURE — 99999 PR PBB SHADOW E&M-EST. PATIENT-LVL III: CPT | Mod: PBBFAC,,, | Performed by: PODIATRIST

## 2020-05-19 PROCEDURE — 99999 PR PBB SHADOW E&M-EST. PATIENT-LVL III: ICD-10-PCS | Mod: PBBFAC,,, | Performed by: PODIATRIST

## 2020-05-19 NOTE — PROGRESS NOTES
Subjective:      Patient ID: Dre Short is a 50 y.o. male.    Chief Complaint: Wound Care (left ankle)    Dre is a 50 y.o. male who presents to the clinic for annual evaluation and treatment of diabetic feet. Dre has a past medical history of Colon polyps (7/3/2012), Combined hyperlipidemia associated with type 2 diabetes mellitus, Diabetes mellitus type II, uncontrolled, Diverticulitis of colon (7/3/2012), Fatty liver, Hyperlipidemia, Hypertension associated with diabetes, and Sleep apnea. Patient relates pain in the feet is improved on gabapentin, notices when he sometimes takes 2 in the afternoon it is even better. The plantar foot pain is better with the improved shoes he got as suggested.    5/5/20: Patient presents with cellulitis and new ulceration left lateral ankle. He has been on antibiotics the last week which has helped some with the redness. Here for continued wound care.    5/19/20: Patient returns for continued wound care left lateral ankle, doing well, dressings remained c/d/i. No new pedal complaints.     PCP: Scott Peñaloza MD    Date Last Seen by PCP: 4/17/19    Current shoe gear: Casual shoes    Hemoglobin A1C   Date Value Ref Range Status   04/16/2019 9.5 (H) <5.7 % of total Hgb Final     Comment:     For someone without known diabetes, a hemoglobin A1c  value of 6.5% or greater indicates that they may have   diabetes and this should be confirmed with a follow-up   test.     For someone with known diabetes, a value <7% indicates   that their diabetes is well controlled and a value   greater than or equal to 7% indicates suboptimal   control. A1c targets should be individualized based on   duration of diabetes, age, comorbid conditions, and   other considerations.     Currently, no consensus exists regarding use of  hemoglobin A1c for diagnosis of diabetes for children.         11/03/2017 10.7 (H) 4.0 - 5.6 % Final     Comment:     According to ADA guidelines, hemoglobin A1c <7.0%  represents  optimal control in non-pregnant diabetic patients. Different  metrics may apply to specific patient populations.   Standards of Medical Care in Diabetes-2016.  For the purpose of screening for the presence of diabetes:  <5.7%     Consistent with the absence of diabetes  5.7-6.4%  Consistent with increasing risk for diabetes   (prediabetes)  >or=6.5%  Consistent with diabetes  Currently, no consensus exists for use of hemoglobin A1c  for diagnosis of diabetes for children.  This Hemoglobin A1c assay has significant interference with fetal   hemoglobin   (HbF). The results are invalid for patients with abnormal amounts of   HbF,   including those with known Hereditary Persistence   of Fetal Hemoglobin. Heterozygous hemoglobin variants (HbAS, HbAC,   HbAD, HbAE, HbA2) do not significantly interfere with this assay;   however, presence of multiple variants in a sample may impact the %   interference.     06/14/2017 12.6 (H) 4.0 - 5.6 % Final     Comment:     According to ADA guidelines, hemoglobin A1c <7.0% represents  optimal control in non-pregnant diabetic patients. Different  metrics may apply to specific patient populations.   Standards of Medical Care in Diabetes-2016.  For the purpose of screening for the presence of diabetes:  <5.7%     Consistent with the absence of diabetes  5.7-6.4%  Consistent with increasing risk for diabetes   (prediabetes)  >or=6.5%  Consistent with diabetes  Currently, no consensus exists for use of hemoglobin A1c  for diagnosis of diabetes for children.  This Hemoglobin A1c assay has significant interference with fetal   hemoglobin   (HbF). The results are invalid for patients with abnormal amounts of   HbF,   including those with known Hereditary Persistence   of Fetal Hemoglobin. Heterozygous hemoglobin variants (HbAS, HbAC,   HbAD, HbAE, HbA2) do not significantly interfere with this assay;   however, presence of multiple variants in a sample may impact the %    interference.             Review of Systems   Constitution: Negative for chills and fever.   Cardiovascular: Positive for leg swelling. Negative for claudication.   Respiratory: Negative for shortness of breath.    Skin: Positive for nail changes. Negative for itching and rash.   Musculoskeletal: Positive for arthritis. Negative for muscle cramps, muscle weakness and myalgias.   Gastrointestinal: Negative for nausea and vomiting.   Neurological: Positive for numbness and paresthesias. Negative for focal weakness and loss of balance.           Objective:      Physical Exam   Constitutional: He is oriented to person, place, and time. He appears well-developed and well-nourished. No distress.   Cardiovascular:   Pulses:       Dorsalis pedis pulses are 2+ on the right side, and 2+ on the left side.        Posterior tibial pulses are 2+ on the right side, and 2+ on the left side.   < 3 sec capillary refill time to toes 1-5 bilateral. Feet are warm to touch proximally with distal cooling b/l. There is no hair growth on the feet and toes b/l. There is moderate edema b/l. No spider veins or varicosities present b/l.      Musculoskeletal:   Resolved discomfort to palpation at the plantar metatarsal heads area.    Equinus noted b/l ankles with < 10 deg DF noted. MMT 5/5 in DF/PF/Inv/Ev resistance with no reproduction of pain in any direction. Passive range of motion of ankle and pedal joints is painless b/l.     Neurological: He is alert and oriented to person, place, and time. He has normal strength. He displays no atrophy and no tremor. A sensory deficit is present. He exhibits normal muscle tone.   Negative tinel sign bilateral. Diminished vibratory and protective sensation bilateral.   Skin: Skin is warm, dry and intact. No abrasion, no bruising, no burn, no ecchymosis, no laceration, no lesion, no petechiae and no rash noted. He is not diaphoretic. No cyanosis or erythema. No pallor. Nails show no clubbing.   Skin  temperature, texture and turgor within normal limits.'    Ulcer Location: left lateral ankle over the malleolus  Measurements: pre 0.2x0.2x0.2 cm, post: 2.4x1.0x0.2 cm  Periwound: Intact  Drainage: serosanguinous.  Pus: None.  Malodor: None.  Base:  100% granular. 0% fibrin.  Signs of infection: None.           Psychiatric: He has a normal mood and affect. His behavior is normal.             Assessment:       Encounter Diagnoses   Name Primary?    Ankle ulcer, left, with fat layer exposed Yes    Type II diabetes mellitus with neurological manifestations     Type II diabetes mellitus with peripheral circulatory disorder          Plan:       Dre was seen today for wound care.    Diagnoses and all orders for this visit:    Ankle ulcer, left, with fat layer exposed  -     Wound Debridement    Type II diabetes mellitus with neurological manifestations    Type II diabetes mellitus with peripheral circulatory disorder      I counseled the patient on his conditions, their implications and medical management.    Shoe inspection. Diabetic Foot Education. Patient reminded of the importance of good nutrition and blood sugar control to help prevent podiatric complications of diabetes. Patient instructed on proper foot hygeine. We discussed wearing proper shoe gear, daily foot inspections, never walking without protective shoe gear, never putting sharp instruments to feet    Ulcer was debrided dressed with rashmi and aperture with modified football    Return in 1 week wound care    Alfredo Shirley DPM

## 2020-05-19 NOTE — PROCEDURES
"Wound Debridement  Date/Time: 5/19/2020 1:30 PM  Performed by: Alfredo Shirley DPM  Authorized by: Alfredo Shirley DPM     Time out: Immediately prior to procedure a "time out" was called to verify the correct patient, procedure, equipment, support staff and site/side marked as required.    Consent Done?:  Yes (Verbal)  Local anesthesia used?: No      Wound Details:    Location:  Left foot    Location:  Left Ankle    Type of Debridement:  Excisional       Length (cm):  2.4       Area (sq cm):  2.4       Width (cm):  1       Percent Debrided (%):  100       Depth (cm):  0.2       Total Area Debrided (sq cm):  2.4    Depth of debridement:  Subcutaneous tissue    Devitalized tissue debrided:  Biofilm, Slough and Necrotic/Eschar    Instruments:  Curette    Bleeding:  Minimal  Hemostasis Achieved: Yes    Method Used:  Pressure  Patient tolerance:  Patient tolerated the procedure well with no immediate complications      "

## 2020-05-24 ENCOUNTER — PATIENT OUTREACH (OUTPATIENT)
Dept: ADMINISTRATIVE | Facility: OTHER | Age: 50
End: 2020-05-24

## 2020-05-25 ENCOUNTER — PATIENT MESSAGE (OUTPATIENT)
Dept: HOME HEALTH SERVICES | Facility: CLINIC | Age: 50
End: 2020-05-25

## 2020-05-25 NOTE — PROGRESS NOTES
Subjective:      Patient ID: Dre Short is a 50 y.o. male.    Chief Complaint: Wound Care    Dre is a 50 y.o. male who presents to the clinic for annual evaluation and treatment of diabetic feet. Dre has a past medical history of Colon polyps (7/3/2012), Combined hyperlipidemia associated with type 2 diabetes mellitus, Diabetes mellitus type II, uncontrolled, Diverticulitis of colon (7/3/2012), Fatty liver, Hyperlipidemia, Hypertension associated with diabetes, and Sleep apnea. Patient relates pain in the feet is improved on gabapentin, notices when he sometimes takes 2 in the afternoon it is even better. The plantar foot pain is better with the improved shoes he got as suggested.    5/5/20: Patient presents with cellulitis and new ulceration left lateral ankle. He has been on antibiotics the last week which has helped some with the redness. Here for continued wound care.    5/12/20: Patient returns for follow up wound care left ankle and great toe. Remained in darco and football, no new pedal complaints.      PCP: Scott Peñaloza MD    Date Last Seen by PCP: 4/17/19    Current shoe gear: Casual shoes    Hemoglobin A1C   Date Value Ref Range Status   04/16/2019 9.5 (H) <5.7 % of total Hgb Final     Comment:     For someone without known diabetes, a hemoglobin A1c  value of 6.5% or greater indicates that they may have   diabetes and this should be confirmed with a follow-up   test.     For someone with known diabetes, a value <7% indicates   that their diabetes is well controlled and a value   greater than or equal to 7% indicates suboptimal   control. A1c targets should be individualized based on   duration of diabetes, age, comorbid conditions, and   other considerations.     Currently, no consensus exists regarding use of  hemoglobin A1c for diagnosis of diabetes for children.         11/03/2017 10.7 (H) 4.0 - 5.6 % Final     Comment:     According to ADA guidelines, hemoglobin A1c <7.0% represents  optimal  control in non-pregnant diabetic patients. Different  metrics may apply to specific patient populations.   Standards of Medical Care in Diabetes-2016.  For the purpose of screening for the presence of diabetes:  <5.7%     Consistent with the absence of diabetes  5.7-6.4%  Consistent with increasing risk for diabetes   (prediabetes)  >or=6.5%  Consistent with diabetes  Currently, no consensus exists for use of hemoglobin A1c  for diagnosis of diabetes for children.  This Hemoglobin A1c assay has significant interference with fetal   hemoglobin   (HbF). The results are invalid for patients with abnormal amounts of   HbF,   including those with known Hereditary Persistence   of Fetal Hemoglobin. Heterozygous hemoglobin variants (HbAS, HbAC,   HbAD, HbAE, HbA2) do not significantly interfere with this assay;   however, presence of multiple variants in a sample may impact the %   interference.     06/14/2017 12.6 (H) 4.0 - 5.6 % Final     Comment:     According to ADA guidelines, hemoglobin A1c <7.0% represents  optimal control in non-pregnant diabetic patients. Different  metrics may apply to specific patient populations.   Standards of Medical Care in Diabetes-2016.  For the purpose of screening for the presence of diabetes:  <5.7%     Consistent with the absence of diabetes  5.7-6.4%  Consistent with increasing risk for diabetes   (prediabetes)  >or=6.5%  Consistent with diabetes  Currently, no consensus exists for use of hemoglobin A1c  for diagnosis of diabetes for children.  This Hemoglobin A1c assay has significant interference with fetal   hemoglobin   (HbF). The results are invalid for patients with abnormal amounts of   HbF,   including those with known Hereditary Persistence   of Fetal Hemoglobin. Heterozygous hemoglobin variants (HbAS, HbAC,   HbAD, HbAE, HbA2) do not significantly interfere with this assay;   however, presence of multiple variants in a sample may impact the %   interference.              Review of Systems   Constitution: Negative for chills and fever.   Cardiovascular: Positive for leg swelling. Negative for claudication.   Respiratory: Negative for shortness of breath.    Skin: Positive for nail changes. Negative for itching and rash.   Musculoskeletal: Positive for arthritis. Negative for muscle cramps, muscle weakness and myalgias.   Gastrointestinal: Negative for nausea and vomiting.   Neurological: Positive for numbness and paresthesias. Negative for focal weakness and loss of balance.           Objective:      Physical Exam   Constitutional: He is oriented to person, place, and time. He appears well-developed and well-nourished. No distress.   Cardiovascular:   Pulses:       Dorsalis pedis pulses are 2+ on the right side, and 2+ on the left side.        Posterior tibial pulses are 2+ on the right side, and 2+ on the left side.   < 3 sec capillary refill time to toes 1-5 bilateral. Feet are warm to touch proximally with distal cooling b/l. There is no hair growth on the feet and toes b/l. There is moderate edema b/l. No spider veins or varicosities present b/l.      Musculoskeletal:   Resolved discomfort to palpation at the plantar metatarsal heads area.    Equinus noted b/l ankles with < 10 deg DF noted. MMT 5/5 in DF/PF/Inv/Ev resistance with no reproduction of pain in any direction. Passive range of motion of ankle and pedal joints is painless b/l.     Neurological: He is alert and oriented to person, place, and time. He has normal strength. He displays no atrophy and no tremor. A sensory deficit is present. He exhibits normal muscle tone.   Negative tinel sign bilateral. Diminished vibratory and protective sensation bilateral.   Skin: Skin is warm, dry and intact. No abrasion, no bruising, no burn, no ecchymosis, no laceration, no lesion, no petechiae and no rash noted. He is not diaphoretic. No cyanosis or erythema. No pallor. Nails show no clubbing.   Skin temperature, texture  and turgor within normal limits.'    Ulcer Location: left lateral ankle over the malleolus  Measurements: pre 2.3x1.4x0.1 cm, post: 2.5x1.5x0.2 cm  Periwound: Intact  Drainage: serosanguinous.  Pus: None.  Malodor: None.  Base:  100% granular. 0% fibrin.  Signs of infection: None.      Dorsal great toe: 0.8x0.5x0.2 cm  Dry stable eschar covering   Psychiatric: He has a normal mood and affect. His behavior is normal.             Assessment:       Encounter Diagnoses   Name Primary?    Ankle ulcer, left, with fat layer exposed Yes    Type II diabetes mellitus with peripheral circulatory disorder          Plan:       Dre was seen today for wound care.    Diagnoses and all orders for this visit:    Ankle ulcer, left, with fat layer exposed  -     Wound Debridement    Type II diabetes mellitus with peripheral circulatory disorder      I counseled the patient on his conditions, their implications and medical management.    Shoe inspection. Diabetic Foot Education. Patient reminded of the importance of good nutrition and blood sugar control to help prevent podiatric complications of diabetes. Patient instructed on proper foot hygeine. We discussed wearing proper shoe gear, daily foot inspections, never walking without protective shoe gear, never putting sharp instruments to feet    Ulcer was debrided, dressed with iodosorb and aperture with modified football    Return in 1 week wound care    Alfredo Shirley DPM

## 2020-05-25 NOTE — TELEPHONE ENCOUNTER
Please see previous message from jenni pimentel please    Last seen you June 2017 please advise were to schedule please.

## 2020-05-25 NOTE — PROCEDURES
"Wound Debridement  Date/Time: 5/12/2020 8:45 AM  Performed by: Alfredo Shirley DPM  Authorized by: Alfredo Shirley DPM     Time out: Immediately prior to procedure a "time out" was called to verify the correct patient, procedure, equipment, support staff and site/side marked as required.    Consent Done?:  Yes (Verbal)  Local anesthesia used?: No      Wound Details:    Location:  Left foot    Location:  Left Ankle    Type of Debridement:  Excisional       Length (cm):  2.5       Area (sq cm):  3.75       Width (cm):  1.5       Percent Debrided (%):  100       Depth (cm):  0.2       Total Area Debrided (sq cm):  3.75    Depth of debridement:  Subcutaneous tissue    Devitalized tissue debrided:  Biofilm, Necrotic/Eschar and Slough    Instruments:  Curette    Bleeding:  Minimal  Hemostasis Achieved: Yes    Method Used:  Pressure  Patient tolerance:  Patient tolerated the procedure well with no immediate complications      "

## 2020-05-25 NOTE — TELEPHONE ENCOUNTER
Please see my chart message - Patient has Only Hgba1c in the chart for [facility]  Dr Peñaloza - please order any other tests that you would like for him to have for FU with you at "[facility].     Also he will need to be called and please make the appt with Dr Peñaloza for FU .

## 2020-05-26 ENCOUNTER — TELEPHONE (OUTPATIENT)
Dept: FAMILY MEDICINE | Facility: CLINIC | Age: 50
End: 2020-05-26

## 2020-05-26 ENCOUNTER — OFFICE VISIT (OUTPATIENT)
Dept: PODIATRY | Facility: CLINIC | Age: 50
End: 2020-05-26
Payer: COMMERCIAL

## 2020-05-26 VITALS
DIASTOLIC BLOOD PRESSURE: 82 MMHG | HEART RATE: 69 BPM | WEIGHT: 315 LBS | SYSTOLIC BLOOD PRESSURE: 137 MMHG | TEMPERATURE: 97 F | BODY MASS INDEX: 40.43 KG/M2 | HEIGHT: 74 IN

## 2020-05-26 DIAGNOSIS — E11.51 TYPE II DIABETES MELLITUS WITH PERIPHERAL CIRCULATORY DISORDER: ICD-10-CM

## 2020-05-26 DIAGNOSIS — E11.49 TYPE II DIABETES MELLITUS WITH NEUROLOGICAL MANIFESTATIONS: ICD-10-CM

## 2020-05-26 DIAGNOSIS — L97.322 ANKLE ULCER, LEFT, WITH FAT LAYER EXPOSED: Primary | ICD-10-CM

## 2020-05-26 PROCEDURE — 11042 WOUND DEBRIDEMENT: ICD-10-PCS | Mod: LT,S$GLB,, | Performed by: PODIATRIST

## 2020-05-26 PROCEDURE — 11042 DBRDMT SUBQ TIS 1ST 20SQCM/<: CPT | Mod: LT,S$GLB,, | Performed by: PODIATRIST

## 2020-05-26 PROCEDURE — 99999 PR PBB SHADOW E&M-EST. PATIENT-LVL III: ICD-10-PCS | Mod: PBBFAC,,, | Performed by: PODIATRIST

## 2020-05-26 PROCEDURE — 99999 PR PBB SHADOW E&M-EST. PATIENT-LVL III: CPT | Mod: PBBFAC,,, | Performed by: PODIATRIST

## 2020-05-26 PROCEDURE — 99499 NO LOS: ICD-10-PCS | Mod: S$GLB,,, | Performed by: PODIATRIST

## 2020-05-26 PROCEDURE — 99499 UNLISTED E&M SERVICE: CPT | Mod: S$GLB,,, | Performed by: PODIATRIST

## 2020-05-27 NOTE — TELEPHONE ENCOUNTER
Ok to place in a regular spot.  Can use a green hold, but try regular 1st.  I have 2 new patient apt next Thursday (he is now a new pt to me).  Can use that for 20 min. Make sure labs before - looks like Kerry sent a bunch needed to [facility] in January.  Please confirm this otherwise I need to reorder his needed labs.

## 2020-05-31 ENCOUNTER — PATIENT OUTREACH (OUTPATIENT)
Dept: ADMINISTRATIVE | Facility: OTHER | Age: 50
End: 2020-05-31

## 2020-06-02 ENCOUNTER — OFFICE VISIT (OUTPATIENT)
Dept: PODIATRY | Facility: CLINIC | Age: 50
End: 2020-06-02
Payer: COMMERCIAL

## 2020-06-02 VITALS
TEMPERATURE: 98 F | SYSTOLIC BLOOD PRESSURE: 137 MMHG | WEIGHT: 315 LBS | DIASTOLIC BLOOD PRESSURE: 86 MMHG | BODY MASS INDEX: 40.43 KG/M2 | HEIGHT: 74 IN | HEART RATE: 60 BPM

## 2020-06-02 DIAGNOSIS — L97.322 ANKLE ULCER, LEFT, WITH FAT LAYER EXPOSED: Primary | ICD-10-CM

## 2020-06-02 PROCEDURE — 99499 NO LOS: ICD-10-PCS | Mod: S$GLB,,, | Performed by: PODIATRIST

## 2020-06-02 PROCEDURE — 99499 UNLISTED E&M SERVICE: CPT | Mod: S$GLB,,, | Performed by: PODIATRIST

## 2020-06-02 PROCEDURE — 99999 PR PBB SHADOW E&M-EST. PATIENT-LVL IV: ICD-10-PCS | Mod: PBBFAC,,, | Performed by: PODIATRIST

## 2020-06-02 PROCEDURE — 11042 DBRDMT SUBQ TIS 1ST 20SQCM/<: CPT | Mod: LT,S$GLB,, | Performed by: PODIATRIST

## 2020-06-02 PROCEDURE — 99999 PR PBB SHADOW E&M-EST. PATIENT-LVL IV: CPT | Mod: PBBFAC,,, | Performed by: PODIATRIST

## 2020-06-02 PROCEDURE — 11042 WOUND DEBRIDEMENT: ICD-10-PCS | Mod: LT,S$GLB,, | Performed by: PODIATRIST

## 2020-06-06 ENCOUNTER — PATIENT OUTREACH (OUTPATIENT)
Dept: ADMINISTRATIVE | Facility: OTHER | Age: 50
End: 2020-06-06

## 2020-06-07 NOTE — PROGRESS NOTES
Subjective:      Patient ID: Dre Short is a 50 y.o. male.    Chief Complaint: Wound Care (Left ankle)    Dre is a 50 y.o. male who presents to the clinic for annual evaluation and treatment of diabetic feet. Dre has a past medical history of Colon polyps (7/3/2012), Combined hyperlipidemia associated with type 2 diabetes mellitus, Diabetes mellitus type II, uncontrolled, Diverticulitis of colon (7/3/2012), Fatty liver, Hyperlipidemia, Hypertension associated with diabetes, and Sleep apnea. Patient relates pain in the feet is improved on gabapentin, notices when he sometimes takes 2 in the afternoon it is even better. The plantar foot pain is better with the improved shoes he got as suggested.    5/5/20: Patient presents with cellulitis and new ulceration left lateral ankle. He has been on antibiotics the last week which has helped some with the redness. Here for continued wound care.    5/19/20: Patient returns for continued wound care left lateral ankle, doing well, dressings remained c/d/i. No new pedal complaints.     5/26/20: Patient returns for follow up wound care left ankle and great toe. Dressing remained c/d/i    PCP: Scott Peñaloza MD    Date Last Seen by PCP: 4/17/19    Current shoe gear: Casual shoes    Hemoglobin A1C   Date Value Ref Range Status   04/16/2019 9.5 (H) <5.7 % of total Hgb Final     Comment:     For someone without known diabetes, a hemoglobin A1c  value of 6.5% or greater indicates that they may have   diabetes and this should be confirmed with a follow-up   test.     For someone with known diabetes, a value <7% indicates   that their diabetes is well controlled and a value   greater than or equal to 7% indicates suboptimal   control. A1c targets should be individualized based on   duration of diabetes, age, comorbid conditions, and   other considerations.     Currently, no consensus exists regarding use of  hemoglobin A1c for diagnosis of diabetes for children.          11/03/2017 10.7 (H) 4.0 - 5.6 % Final     Comment:     According to ADA guidelines, hemoglobin A1c <7.0% represents  optimal control in non-pregnant diabetic patients. Different  metrics may apply to specific patient populations.   Standards of Medical Care in Diabetes-2016.  For the purpose of screening for the presence of diabetes:  <5.7%     Consistent with the absence of diabetes  5.7-6.4%  Consistent with increasing risk for diabetes   (prediabetes)  >or=6.5%  Consistent with diabetes  Currently, no consensus exists for use of hemoglobin A1c  for diagnosis of diabetes for children.  This Hemoglobin A1c assay has significant interference with fetal   hemoglobin   (HbF). The results are invalid for patients with abnormal amounts of   HbF,   including those with known Hereditary Persistence   of Fetal Hemoglobin. Heterozygous hemoglobin variants (HbAS, HbAC,   HbAD, HbAE, HbA2) do not significantly interfere with this assay;   however, presence of multiple variants in a sample may impact the %   interference.     06/14/2017 12.6 (H) 4.0 - 5.6 % Final     Comment:     According to ADA guidelines, hemoglobin A1c <7.0% represents  optimal control in non-pregnant diabetic patients. Different  metrics may apply to specific patient populations.   Standards of Medical Care in Diabetes-2016.  For the purpose of screening for the presence of diabetes:  <5.7%     Consistent with the absence of diabetes  5.7-6.4%  Consistent with increasing risk for diabetes   (prediabetes)  >or=6.5%  Consistent with diabetes  Currently, no consensus exists for use of hemoglobin A1c  for diagnosis of diabetes for children.  This Hemoglobin A1c assay has significant interference with fetal   hemoglobin   (HbF). The results are invalid for patients with abnormal amounts of   HbF,   including those with known Hereditary Persistence   of Fetal Hemoglobin. Heterozygous hemoglobin variants (HbAS, HbAC,   HbAD, HbAE, HbA2) do not significantly  interfere with this assay;   however, presence of multiple variants in a sample may impact the %   interference.             Review of Systems   Constitution: Negative for chills and fever.   Cardiovascular: Positive for leg swelling. Negative for claudication.   Respiratory: Negative for shortness of breath.    Skin: Positive for nail changes. Negative for itching and rash.   Musculoskeletal: Positive for arthritis. Negative for muscle cramps, muscle weakness and myalgias.   Gastrointestinal: Negative for nausea and vomiting.   Neurological: Positive for numbness and paresthesias. Negative for focal weakness and loss of balance.           Objective:      Physical Exam   Constitutional: He is oriented to person, place, and time. He appears well-developed and well-nourished. No distress.   Cardiovascular:   Pulses:       Dorsalis pedis pulses are 2+ on the right side, and 2+ on the left side.        Posterior tibial pulses are 2+ on the right side, and 2+ on the left side.   < 3 sec capillary refill time to toes 1-5 bilateral. Feet are warm to touch proximally with distal cooling b/l. There is no hair growth on the feet and toes b/l. There is moderate edema b/l. No spider veins or varicosities present b/l.      Musculoskeletal:   Resolved discomfort to palpation at the plantar metatarsal heads area.    Equinus noted b/l ankles with < 10 deg DF noted. MMT 5/5 in DF/PF/Inv/Ev resistance with no reproduction of pain in any direction. Passive range of motion of ankle and pedal joints is painless b/l.     Neurological: He is alert and oriented to person, place, and time. He has normal strength. He displays no atrophy and no tremor. A sensory deficit is present. He exhibits normal muscle tone.   Negative tinel sign bilateral. Diminished vibratory and protective sensation bilateral.   Skin: Skin is warm and dry. Lesion noted. No abrasion, no bruising, no burn, no ecchymosis, no laceration, no petechiae and no rash noted. He  is not diaphoretic. No cyanosis or erythema. No pallor. Nails show no clubbing.   Skin temperature, texture and turgor within normal limits.'    Ulcer Location: left lateral ankle over the malleolus  Measurements: pre 1.9x0.7x0.1 cm, post: 2.0x0.8x0.2 cm  Periwound: Intact  Drainage: serosanguinous.  Pus: None.  Malodor: None.  Base:  100% granular. 0% fibrin.  Signs of infection: None.    Ulcer Location: Dorsal left great toe  Measurements: 0.4x0.2x0.1  Periwound: Intact  Drainage: serosanguinous.  Pus: None.  Malodor: None.  Base:  100% eschar  Signs of infection: None.             Psychiatric: He has a normal mood and affect. His behavior is normal.             Assessment:       Encounter Diagnoses   Name Primary?    Ankle ulcer, left, with fat layer exposed Yes    Type II diabetes mellitus with neurological manifestations     Type II diabetes mellitus with peripheral circulatory disorder          Plan:       Dre was seen today for wound care.    Diagnoses and all orders for this visit:    Ankle ulcer, left, with fat layer exposed  -     Wound Debridement    Type II diabetes mellitus with neurological manifestations    Type II diabetes mellitus with peripheral circulatory disorder      I counseled the patient on his conditions, their implications and medical management.    Shoe inspection. Diabetic Foot Education. Patient reminded of the importance of good nutrition and blood sugar control to help prevent podiatric complications of diabetes. Patient instructed on proper foot hygeine. We discussed wearing proper shoe gear, daily foot inspections, never walking without protective shoe gear, never putting sharp instruments to feet    Ulcer was debrided dressed with rashmi and aperture with modified football    Return in 1 week wound care    Alfredo Shirley DPM

## 2020-06-07 NOTE — PROCEDURES
"Wound Debridement  Date/Time: 5/26/2020 8:45 AM  Performed by: Alfredo Shirley DPM  Authorized by: Alfredo Shirley DPM     Time out: Immediately prior to procedure a "time out" was called to verify the correct patient, procedure, equipment, support staff and site/side marked as required.    Consent Done?:  Yes (Verbal)  Local anesthesia used?: No      Wound Details:    Location:  Left foot    Location:  Left Ankle    Type of Debridement:  Excisional       Length (cm):  2       Area (sq cm):  1.6       Width (cm):  0.8       Percent Debrided (%):  100       Depth (cm):  0.2       Total Area Debrided (sq cm):  1.6    Depth of debridement:  Subcutaneous tissue    Devitalized tissue debrided:  Biofilm, Slough and Necrotic/Eschar    Instruments:  Curette    Bleeding:  Minimal  Hemostasis Achieved: Yes    Method Used:  Pressure  Patient tolerance:  Patient tolerated the procedure well with no immediate complications      "

## 2020-06-09 ENCOUNTER — OFFICE VISIT (OUTPATIENT)
Dept: PODIATRY | Facility: CLINIC | Age: 50
End: 2020-06-09
Payer: COMMERCIAL

## 2020-06-09 VITALS
WEIGHT: 315 LBS | HEIGHT: 74 IN | DIASTOLIC BLOOD PRESSURE: 83 MMHG | BODY MASS INDEX: 40.43 KG/M2 | SYSTOLIC BLOOD PRESSURE: 121 MMHG | HEART RATE: 76 BPM | TEMPERATURE: 98 F

## 2020-06-09 DIAGNOSIS — E11.49 TYPE II DIABETES MELLITUS WITH NEUROLOGICAL MANIFESTATIONS: ICD-10-CM

## 2020-06-09 DIAGNOSIS — L97.522 SKIN ULCER OF LEFT GREAT TOE WITH FAT LAYER EXPOSED: ICD-10-CM

## 2020-06-09 DIAGNOSIS — L97.322 ANKLE ULCER, LEFT, WITH FAT LAYER EXPOSED: Primary | ICD-10-CM

## 2020-06-09 DIAGNOSIS — E11.51 TYPE II DIABETES MELLITUS WITH PERIPHERAL CIRCULATORY DISORDER: ICD-10-CM

## 2020-06-09 PROCEDURE — 99999 PR PBB SHADOW E&M-EST. PATIENT-LVL III: ICD-10-PCS | Mod: PBBFAC,,, | Performed by: PODIATRIST

## 2020-06-09 PROCEDURE — 99499 NO LOS: ICD-10-PCS | Mod: S$GLB,,, | Performed by: PODIATRIST

## 2020-06-09 PROCEDURE — 99499 UNLISTED E&M SERVICE: CPT | Mod: S$GLB,,, | Performed by: PODIATRIST

## 2020-06-09 PROCEDURE — 11042 DBRDMT SUBQ TIS 1ST 20SQCM/<: CPT | Mod: LT,S$GLB,, | Performed by: PODIATRIST

## 2020-06-09 PROCEDURE — 11042 WOUND DEBRIDEMENT: ICD-10-PCS | Mod: LT,S$GLB,, | Performed by: PODIATRIST

## 2020-06-09 PROCEDURE — 99999 PR PBB SHADOW E&M-EST. PATIENT-LVL III: CPT | Mod: PBBFAC,,, | Performed by: PODIATRIST

## 2020-06-09 NOTE — PROCEDURES
"Wound Debridement  Date/Time: 6/9/2020 8:30 AM  Performed by: Alfredo Shirley DPM  Authorized by: Alfredo Shirley DPM     Time out: Immediately prior to procedure a "time out" was called to verify the correct patient, procedure, equipment, support staff and site/side marked as required.    Consent Done?:  Yes (Verbal)  Local anesthesia used?: No      Wound Details:    Location:  Left foot    Location:  Left 1st Toe    Type of Debridement:  Excisional       Length (cm):  0.7       Area (sq cm):  0.21       Width (cm):  0.3       Percent Debrided (%):  100       Depth (cm):  0.1       Total Area Debrided (sq cm):  0.21    Depth of debridement:  Subcutaneous tissue    Devitalized tissue debrided:  Necrotic/Eschar and Slough    Instruments:  Curette    Additional wounds:  1    2nd Wound Details:     Location:  Left foot    Location:  Left Ankle    Location:  Left Ankle    Type of Debridement:  Excisional       Length (cm):  0.3       Area (sq cm):  0.06       Width (cm):  0.2       Percent Debrided (%):  100       Depth (cm):  0.2       Total Area Debrided (sq cm):  0.06    Depth of debridement:  Subcutaneous tissue    Devitalized tissue debrided:  Necrotic/Eschar and Slough    Instruments:  Curette    Bleeding:  Minimal  Hemostasis Achieved: Yes    Method Used:  Pressure  Patient tolerance:  Patient tolerated the procedure well with no immediate complications      "

## 2020-06-09 NOTE — PROGRESS NOTES
Subjective:      Patient ID: Dre Short is a 50 y.o. male.    Chief Complaint: Wound Check (LEFT)    Dre is a 50 y.o. male who presents to the clinic for annual evaluation and treatment of diabetic feet. Dre has a past medical history of Colon polyps (7/3/2012), Combined hyperlipidemia associated with type 2 diabetes mellitus, Diabetes mellitus type II, uncontrolled, Diverticulitis of colon (7/3/2012), Fatty liver, Hyperlipidemia, Hypertension associated with diabetes, and Sleep apnea. Patient relates pain in the feet is improved on gabapentin, notices when he sometimes takes 2 in the afternoon it is even better. The plantar foot pain is better with the improved shoes he got as suggested.    5/5/20: Patient presents with cellulitis and new ulceration left lateral ankle. He has been on antibiotics the last week which has helped some with the redness. Here for continued wound care.    5/19/20: Patient returns for continued wound care left lateral ankle, doing well, dressings remained c/d/i. No new pedal complaints.     5/26/20: Patient returns for follow up wound care left ankle and great toe. Dressing remained c/d/i    6/9/20: Patient returns for continued wound care, no new complaints. Dressing remained c/d/i    PCP: Scott Peñaloza MD    Date Last Seen by PCP: 4/17/19    Current shoe gear: Casual shoes    Hemoglobin A1C   Date Value Ref Range Status   04/16/2019 9.5 (H) <5.7 % of total Hgb Final     Comment:     For someone without known diabetes, a hemoglobin A1c  value of 6.5% or greater indicates that they may have   diabetes and this should be confirmed with a follow-up   test.     For someone with known diabetes, a value <7% indicates   that their diabetes is well controlled and a value   greater than or equal to 7% indicates suboptimal   control. A1c targets should be individualized based on   duration of diabetes, age, comorbid conditions, and   other considerations.     Currently, no consensus exists  regarding use of  hemoglobin A1c for diagnosis of diabetes for children.         11/03/2017 10.7 (H) 4.0 - 5.6 % Final     Comment:     According to ADA guidelines, hemoglobin A1c <7.0% represents  optimal control in non-pregnant diabetic patients. Different  metrics may apply to specific patient populations.   Standards of Medical Care in Diabetes-2016.  For the purpose of screening for the presence of diabetes:  <5.7%     Consistent with the absence of diabetes  5.7-6.4%  Consistent with increasing risk for diabetes   (prediabetes)  >or=6.5%  Consistent with diabetes  Currently, no consensus exists for use of hemoglobin A1c  for diagnosis of diabetes for children.  This Hemoglobin A1c assay has significant interference with fetal   hemoglobin   (HbF). The results are invalid for patients with abnormal amounts of   HbF,   including those with known Hereditary Persistence   of Fetal Hemoglobin. Heterozygous hemoglobin variants (HbAS, HbAC,   HbAD, HbAE, HbA2) do not significantly interfere with this assay;   however, presence of multiple variants in a sample may impact the %   interference.     06/14/2017 12.6 (H) 4.0 - 5.6 % Final     Comment:     According to ADA guidelines, hemoglobin A1c <7.0% represents  optimal control in non-pregnant diabetic patients. Different  metrics may apply to specific patient populations.   Standards of Medical Care in Diabetes-2016.  For the purpose of screening for the presence of diabetes:  <5.7%     Consistent with the absence of diabetes  5.7-6.4%  Consistent with increasing risk for diabetes   (prediabetes)  >or=6.5%  Consistent with diabetes  Currently, no consensus exists for use of hemoglobin A1c  for diagnosis of diabetes for children.  This Hemoglobin A1c assay has significant interference with fetal   hemoglobin   (HbF). The results are invalid for patients with abnormal amounts of   HbF,   including those with known Hereditary Persistence   of Fetal Hemoglobin.  Heterozygous hemoglobin variants (HbAS, HbAC,   HbAD, HbAE, HbA2) do not significantly interfere with this assay;   however, presence of multiple variants in a sample may impact the %   interference.             Review of Systems   Constitution: Negative for chills and fever.   Cardiovascular: Positive for leg swelling. Negative for claudication.   Respiratory: Negative for shortness of breath.    Skin: Positive for nail changes. Negative for itching and rash.   Musculoskeletal: Positive for arthritis. Negative for muscle cramps, muscle weakness and myalgias.   Gastrointestinal: Negative for nausea and vomiting.   Neurological: Positive for numbness and paresthesias. Negative for focal weakness and loss of balance.           Objective:      Physical Exam   Constitutional: He is oriented to person, place, and time. He appears well-developed and well-nourished. No distress.   Cardiovascular:   Pulses:       Dorsalis pedis pulses are 2+ on the right side, and 2+ on the left side.        Posterior tibial pulses are 2+ on the right side, and 2+ on the left side.   < 3 sec capillary refill time to toes 1-5 bilateral. Feet are warm to touch proximally with distal cooling b/l. There is no hair growth on the feet and toes b/l. There is moderate edema b/l. No spider veins or varicosities present b/l.      Musculoskeletal:   Resolved discomfort to palpation at the plantar metatarsal heads area.    Equinus noted b/l ankles with < 10 deg DF noted. MMT 5/5 in DF/PF/Inv/Ev resistance with no reproduction of pain in any direction. Passive range of motion of ankle and pedal joints is painless b/l.     Neurological: He is alert and oriented to person, place, and time. He has normal strength. He displays no atrophy and no tremor. A sensory deficit is present. He exhibits normal muscle tone.   Negative tinel sign bilateral. Diminished vibratory and protective sensation bilateral.   Skin: Skin is warm and dry. Lesion noted. No abrasion,  no bruising, no burn, no ecchymosis, no laceration, no petechiae and no rash noted. He is not diaphoretic. No cyanosis or erythema. No pallor. Nails show no clubbing.   Skin temperature, texture and turgor within normal limits.'    Ulcer Location: left lateral ankle over the malleolus  Measurements: pre Covered in eschar, post: 0.3x0.2x0.2 cm  Periwound: Intact  Drainage: serosanguinous.  Pus: None.  Malodor: None.  Base:  100% granular. 0% fibrin.  Signs of infection: None.    Ulcer Location: Dorsal left great toe  Measurements: pre covered in eschar 0.7x0.3x0.1  Periwound: Intact  Drainage: serosanguinous.  Pus: None.  Malodor: None.  Base:  granular  Signs of infection: None.             Psychiatric: He has a normal mood and affect. His behavior is normal.             Assessment:       Encounter Diagnoses   Name Primary?    Ankle ulcer, left, with fat layer exposed Yes    Skin ulcer of left great toe with fat layer exposed     Type II diabetes mellitus with neurological manifestations     Type II diabetes mellitus with peripheral circulatory disorder          Plan:       Dre was seen today for wound check.    Diagnoses and all orders for this visit:    Ankle ulcer, left, with fat layer exposed  -     Wound Debridement    Skin ulcer of left great toe with fat layer exposed    Type II diabetes mellitus with neurological manifestations    Type II diabetes mellitus with peripheral circulatory disorder      I counseled the patient on his conditions, their implications and medical management.    Shoe inspection. Diabetic Foot Education. Patient reminded of the importance of good nutrition and blood sugar control to help prevent podiatric complications of diabetes. Patient instructed on proper foot hygeine. We discussed wearing proper shoe gear, daily foot inspections, never walking without protective shoe gear, never putting sharp instruments to feet    Ulcer was debrided dressed with rashmi and aperture with  modified football    Return in 1 week wound care    Alfredo Shirley DPM

## 2020-06-15 ENCOUNTER — PATIENT OUTREACH (OUTPATIENT)
Dept: ADMINISTRATIVE | Facility: OTHER | Age: 50
End: 2020-06-15

## 2020-06-15 NOTE — PROCEDURES
"Wound Debridement    Date/Time: 6/2/2020 2:30 PM  Performed by: Alfredo Shirley DPM  Authorized by: Alfredo Shirley DPM     Time out: Immediately prior to procedure a "time out" was called to verify the correct patient, procedure, equipment, support staff and site/side marked as required.    Consent Done?:  Yes (Verbal)  Local anesthesia used?: No      Wound Details:    Location:  Left foot    Location:  Left Ankle    Type of Debridement:  Excisional       Length (cm):  1.6       Area (sq cm):  0.8       Width (cm):  0.5       Percent Debrided (%):  100       Depth (cm):  0.2       Total Area Debrided (sq cm):  0.8    Depth of debridement:  Subcutaneous tissue    Instruments:  Curette    Bleeding:  Minimal  Hemostasis Achieved: Yes    Method Used:  Pressure  Patient tolerance:  Patient tolerated the procedure well with no immediate complications      "

## 2020-06-15 NOTE — PROGRESS NOTES
Subjective:      Patient ID: Dre Short is a 50 y.o. male.    Chief Complaint: Wound Check (Left ankle) and Diabetes Mellitus (PCP:  Dr Peñaloza (ROXANA Langford NP) 4/17/19; HgbA1c:  4/16/19  9.5)    Dre is a 50 y.o. male who presents to the clinic for annual evaluation and treatment of diabetic feet. Dre has a past medical history of Colon polyps (7/3/2012), Combined hyperlipidemia associated with type 2 diabetes mellitus, Diabetes mellitus type II, uncontrolled, Diverticulitis of colon (7/3/2012), Fatty liver, Hyperlipidemia, Hypertension associated with diabetes, and Sleep apnea. Patient relates pain in the feet is improved on gabapentin, notices when he sometimes takes 2 in the afternoon it is even better. The plantar foot pain is better with the improved shoes he got as suggested.    5/5/20: Patient presents with cellulitis and new ulceration left lateral ankle. He has been on antibiotics the last week which has helped some with the redness. Here for continued wound care.    5/19/20: Patient returns for continued wound care left lateral ankle, doing well, dressings remained c/d/i. No new pedal complaints.     5/26/20: Patient returns for follow up wound care left ankle and great toe. Dressing remained c/d/i    6/2/20: Patient returns for continued wound care left ankle and great toe, no new complaints.     PCP: Scott Peñaloza MD    Date Last Seen by PCP: 4/17/19    Current shoe gear: Casual shoes    Hemoglobin A1C   Date Value Ref Range Status   04/16/2019 9.5 (H) <5.7 % of total Hgb Final     Comment:     For someone without known diabetes, a hemoglobin A1c  value of 6.5% or greater indicates that they may have   diabetes and this should be confirmed with a follow-up   test.     For someone with known diabetes, a value <7% indicates   that their diabetes is well controlled and a value   greater than or equal to 7% indicates suboptimal   control. A1c targets should be individualized based on   duration of  diabetes, age, comorbid conditions, and   other considerations.     Currently, no consensus exists regarding use of  hemoglobin A1c for diagnosis of diabetes for children.         11/03/2017 10.7 (H) 4.0 - 5.6 % Final     Comment:     According to ADA guidelines, hemoglobin A1c <7.0% represents  optimal control in non-pregnant diabetic patients. Different  metrics may apply to specific patient populations.   Standards of Medical Care in Diabetes-2016.  For the purpose of screening for the presence of diabetes:  <5.7%     Consistent with the absence of diabetes  5.7-6.4%  Consistent with increasing risk for diabetes   (prediabetes)  >or=6.5%  Consistent with diabetes  Currently, no consensus exists for use of hemoglobin A1c  for diagnosis of diabetes for children.  This Hemoglobin A1c assay has significant interference with fetal   hemoglobin   (HbF). The results are invalid for patients with abnormal amounts of   HbF,   including those with known Hereditary Persistence   of Fetal Hemoglobin. Heterozygous hemoglobin variants (HbAS, HbAC,   HbAD, HbAE, HbA2) do not significantly interfere with this assay;   however, presence of multiple variants in a sample may impact the %   interference.     06/14/2017 12.6 (H) 4.0 - 5.6 % Final     Comment:     According to ADA guidelines, hemoglobin A1c <7.0% represents  optimal control in non-pregnant diabetic patients. Different  metrics may apply to specific patient populations.   Standards of Medical Care in Diabetes-2016.  For the purpose of screening for the presence of diabetes:  <5.7%     Consistent with the absence of diabetes  5.7-6.4%  Consistent with increasing risk for diabetes   (prediabetes)  >or=6.5%  Consistent with diabetes  Currently, no consensus exists for use of hemoglobin A1c  for diagnosis of diabetes for children.  This Hemoglobin A1c assay has significant interference with fetal   hemoglobin   (HbF). The results are invalid for patients with abnormal  amounts of   HbF,   including those with known Hereditary Persistence   of Fetal Hemoglobin. Heterozygous hemoglobin variants (HbAS, HbAC,   HbAD, HbAE, HbA2) do not significantly interfere with this assay;   however, presence of multiple variants in a sample may impact the %   interference.             Review of Systems   Constitution: Negative for chills and fever.   Cardiovascular: Positive for leg swelling. Negative for claudication.   Respiratory: Negative for shortness of breath.    Skin: Positive for nail changes. Negative for itching and rash.   Musculoskeletal: Positive for arthritis. Negative for muscle cramps, muscle weakness and myalgias.   Gastrointestinal: Negative for nausea and vomiting.   Neurological: Positive for numbness and paresthesias. Negative for focal weakness and loss of balance.           Objective:      Physical Exam  Constitutional:       General: He is not in acute distress.     Appearance: He is well-developed. He is not diaphoretic.   Cardiovascular:      Pulses:           Dorsalis pedis pulses are 2+ on the right side and 2+ on the left side.        Posterior tibial pulses are 2+ on the right side and 2+ on the left side.      Comments: < 3 sec capillary refill time to toes 1-5 bilateral. Feet are warm to touch proximally with distal cooling b/l. There is no hair growth on the feet and toes b/l. There is moderate edema b/l. No spider veins or varicosities present b/l.     Musculoskeletal:      Comments: Resolved discomfort to palpation at the plantar metatarsal heads area.    Equinus noted b/l ankles with < 10 deg DF noted. MMT 5/5 in DF/PF/Inv/Ev resistance with no reproduction of pain in any direction. Passive range of motion of ankle and pedal joints is painless b/l.     Skin:     General: Skin is warm and dry.      Coloration: Skin is not pale.      Findings: Lesion present. No abrasion, bruising, burn, ecchymosis, erythema, laceration, petechiae or rash.      Nails: There is no  clubbing.        Comments: Skin temperature, texture and turgor within normal limits.'    Ulcer Location: left lateral ankle over the malleolus  Measurements: pre 1.4x0.4x0.1 cm, post: 1.6x0.5x0.2 cm  Periwound: Intact  Drainage: serosanguinous.  Pus: None.  Malodor: None.  Base:  100% granular. 0% fibrin.  Signs of infection: None.    Ulcer Location: Dorsal left great toe  Measurements: pre: covered in eschar post: 0.3x0.1x0.1  Periwound: Intact  Drainage: serosanguinous.  Pus: None.  Malodor: None.  Base:  100% eschar  Signs of infection: None.             Neurological:      Mental Status: He is alert and oriented to person, place, and time.      Sensory: Sensory deficit present.      Motor: No tremor, atrophy or abnormal muscle tone.      Comments: Negative tinel sign bilateral. Diminished vibratory and protective sensation bilateral.   Psychiatric:         Behavior: Behavior normal.               Assessment:       Encounter Diagnosis   Name Primary?    Ankle ulcer, left, with fat layer exposed Yes         Plan:       Dre was seen today for wound check and diabetes mellitus.    Diagnoses and all orders for this visit:    Ankle ulcer, left, with fat layer exposed  -     Wound Debridement      I counseled the patient on his conditions, their implications and medical management.    Shoe inspection. Diabetic Foot Education. Patient reminded of the importance of good nutrition and blood sugar control to help prevent podiatric complications of diabetes. Patient instructed on proper foot hygeine. We discussed wearing proper shoe gear, daily foot inspections, never walking without protective shoe gear, never putting sharp instruments to feet    Ulcer was debrided dressed with rashmi and aperture with modified football    Return in 1 week wound care    Alfredo Shirley DPM

## 2020-06-16 ENCOUNTER — OFFICE VISIT (OUTPATIENT)
Dept: PODIATRY | Facility: CLINIC | Age: 50
End: 2020-06-16
Payer: COMMERCIAL

## 2020-06-16 VITALS
TEMPERATURE: 98 F | DIASTOLIC BLOOD PRESSURE: 78 MMHG | HEART RATE: 81 BPM | WEIGHT: 308 LBS | BODY MASS INDEX: 39.53 KG/M2 | SYSTOLIC BLOOD PRESSURE: 117 MMHG | HEIGHT: 74 IN

## 2020-06-16 DIAGNOSIS — L97.322 ANKLE ULCER, LEFT, WITH FAT LAYER EXPOSED: Primary | ICD-10-CM

## 2020-06-16 DIAGNOSIS — L97.522 SKIN ULCER OF LEFT GREAT TOE WITH FAT LAYER EXPOSED: ICD-10-CM

## 2020-06-16 DIAGNOSIS — E11.51 TYPE II DIABETES MELLITUS WITH PERIPHERAL CIRCULATORY DISORDER: ICD-10-CM

## 2020-06-16 DIAGNOSIS — E11.49 TYPE II DIABETES MELLITUS WITH NEUROLOGICAL MANIFESTATIONS: ICD-10-CM

## 2020-06-16 PROCEDURE — 99499 UNLISTED E&M SERVICE: CPT | Mod: S$GLB,,, | Performed by: PODIATRIST

## 2020-06-16 PROCEDURE — 11042 WOUND DEBRIDEMENT: ICD-10-PCS | Mod: LT,S$GLB,, | Performed by: PODIATRIST

## 2020-06-16 PROCEDURE — 11042 DBRDMT SUBQ TIS 1ST 20SQCM/<: CPT | Mod: LT,S$GLB,, | Performed by: PODIATRIST

## 2020-06-16 PROCEDURE — 99999 PR PBB SHADOW E&M-EST. PATIENT-LVL IV: CPT | Mod: PBBFAC,,, | Performed by: PODIATRIST

## 2020-06-16 PROCEDURE — 99999 PR PBB SHADOW E&M-EST. PATIENT-LVL IV: ICD-10-PCS | Mod: PBBFAC,,, | Performed by: PODIATRIST

## 2020-06-16 PROCEDURE — 99499 NO LOS: ICD-10-PCS | Mod: S$GLB,,, | Performed by: PODIATRIST

## 2020-06-25 ENCOUNTER — OFFICE VISIT (OUTPATIENT)
Dept: PODIATRY | Facility: CLINIC | Age: 50
End: 2020-06-25
Payer: COMMERCIAL

## 2020-06-25 VITALS
DIASTOLIC BLOOD PRESSURE: 86 MMHG | BODY MASS INDEX: 39.95 KG/M2 | HEART RATE: 92 BPM | WEIGHT: 311.19 LBS | SYSTOLIC BLOOD PRESSURE: 150 MMHG

## 2020-06-25 DIAGNOSIS — Z87.2 HEALED ULCER OF LEFT FOOT ON EXAMINATION: ICD-10-CM

## 2020-06-25 DIAGNOSIS — E11.49 TYPE II DIABETES MELLITUS WITH NEUROLOGICAL MANIFESTATIONS: Primary | ICD-10-CM

## 2020-06-25 PROCEDURE — 99212 PR OFFICE/OUTPT VISIT, EST, LEVL II, 10-19 MIN: ICD-10-PCS | Mod: S$GLB,,, | Performed by: PODIATRIST

## 2020-06-25 PROCEDURE — 99999 PR PBB SHADOW E&M-EST. PATIENT-LVL IV: CPT | Mod: PBBFAC,,, | Performed by: PODIATRIST

## 2020-06-25 PROCEDURE — 99999 PR PBB SHADOW E&M-EST. PATIENT-LVL IV: ICD-10-PCS | Mod: PBBFAC,,, | Performed by: PODIATRIST

## 2020-06-25 PROCEDURE — 99212 OFFICE O/P EST SF 10 MIN: CPT | Mod: S$GLB,,, | Performed by: PODIATRIST

## 2020-06-25 NOTE — PROGRESS NOTES
Subjective:      Patient ID: Dre Short is a 50 y.o. male.    Chief Complaint: Wound Check (left ankle)    Dre is a 50 y.o. male who presents to the clinic for annual evaluation and treatment of diabetic feet. Dre has a past medical history of Colon polyps (7/3/2012), Combined hyperlipidemia associated with type 2 diabetes mellitus, Diabetes mellitus type II, uncontrolled, Diverticulitis of colon (7/3/2012), Fatty liver, Hyperlipidemia, Hypertension associated with diabetes, and Sleep apnea. Patient relates pain in the feet is improved on gabapentin, notices when he sometimes takes 2 in the afternoon it is even better. The plantar foot pain is better with the improved shoes he got as suggested.    5/5/20: Patient presents with cellulitis and new ulceration left lateral ankle. He has been on antibiotics the last week which has helped some with the redness. Here for continued wound care.    5/19/20: Patient returns for continued wound care left lateral ankle, doing well, dressings remained c/d/i. No new pedal complaints.     5/26/20: Patient returns for follow up wound care left ankle and great toe. Dressing remained c/d/i    6/9/20: Patient returns for continued wound care, no new complaints. Dressing remained c/d/i    6/25/20: Patient returns continued wound care dressings intact, no new complaints.    PCP: Scott Peñaloza MD    Date Last Seen by PCP: 4/17/19    Current shoe gear: Casual shoes    Hemoglobin A1C   Date Value Ref Range Status   04/16/2019 9.5 (H) <5.7 % of total Hgb Final     Comment:     For someone without known diabetes, a hemoglobin A1c  value of 6.5% or greater indicates that they may have   diabetes and this should be confirmed with a follow-up   test.     For someone with known diabetes, a value <7% indicates   that their diabetes is well controlled and a value   greater than or equal to 7% indicates suboptimal   control. A1c targets should be individualized based on   duration of  diabetes, age, comorbid conditions, and   other considerations.     Currently, no consensus exists regarding use of  hemoglobin A1c for diagnosis of diabetes for children.         11/03/2017 10.7 (H) 4.0 - 5.6 % Final     Comment:     According to ADA guidelines, hemoglobin A1c <7.0% represents  optimal control in non-pregnant diabetic patients. Different  metrics may apply to specific patient populations.   Standards of Medical Care in Diabetes-2016.  For the purpose of screening for the presence of diabetes:  <5.7%     Consistent with the absence of diabetes  5.7-6.4%  Consistent with increasing risk for diabetes   (prediabetes)  >or=6.5%  Consistent with diabetes  Currently, no consensus exists for use of hemoglobin A1c  for diagnosis of diabetes for children.  This Hemoglobin A1c assay has significant interference with fetal   hemoglobin   (HbF). The results are invalid for patients with abnormal amounts of   HbF,   including those with known Hereditary Persistence   of Fetal Hemoglobin. Heterozygous hemoglobin variants (HbAS, HbAC,   HbAD, HbAE, HbA2) do not significantly interfere with this assay;   however, presence of multiple variants in a sample may impact the %   interference.     06/14/2017 12.6 (H) 4.0 - 5.6 % Final     Comment:     According to ADA guidelines, hemoglobin A1c <7.0% represents  optimal control in non-pregnant diabetic patients. Different  metrics may apply to specific patient populations.   Standards of Medical Care in Diabetes-2016.  For the purpose of screening for the presence of diabetes:  <5.7%     Consistent with the absence of diabetes  5.7-6.4%  Consistent with increasing risk for diabetes   (prediabetes)  >or=6.5%  Consistent with diabetes  Currently, no consensus exists for use of hemoglobin A1c  for diagnosis of diabetes for children.  This Hemoglobin A1c assay has significant interference with fetal   hemoglobin   (HbF). The results are invalid for patients with abnormal  amounts of   HbF,   including those with known Hereditary Persistence   of Fetal Hemoglobin. Heterozygous hemoglobin variants (HbAS, HbAC,   HbAD, HbAE, HbA2) do not significantly interfere with this assay;   however, presence of multiple variants in a sample may impact the %   interference.             Review of Systems   Constitution: Negative for chills and fever.   Cardiovascular: Positive for leg swelling. Negative for claudication.   Respiratory: Negative for shortness of breath.    Skin: Positive for nail changes. Negative for itching and rash.   Musculoskeletal: Positive for arthritis. Negative for muscle cramps, muscle weakness and myalgias.   Gastrointestinal: Negative for nausea and vomiting.   Neurological: Positive for numbness and paresthesias. Negative for focal weakness and loss of balance.           Objective:      Physical Exam  Constitutional:       General: He is not in acute distress.     Appearance: He is well-developed. He is not diaphoretic.   Cardiovascular:      Pulses:           Dorsalis pedis pulses are 2+ on the right side and 2+ on the left side.        Posterior tibial pulses are 2+ on the right side and 2+ on the left side.      Comments: < 3 sec capillary refill time to toes 1-5 bilateral. Feet are warm to touch proximally with distal cooling b/l. There is no hair growth on the feet and toes b/l. There is moderate edema b/l. No spider veins or varicosities present b/l.     Musculoskeletal:      Comments: Resolved discomfort to palpation at the plantar metatarsal heads area.    Equinus noted b/l ankles with < 10 deg DF noted. MMT 5/5 in DF/PF/Inv/Ev resistance with no reproduction of pain in any direction. Passive range of motion of ankle and pedal joints is painless b/l.     Skin:     General: Skin is warm and dry.      Coloration: Skin is not pale.      Findings: Lesion present. No abrasion, bruising, burn, ecchymosis, erythema, laceration, petechiae or rash.      Nails: There is no  clubbing.        Comments: Skin temperature, texture and turgor within normal limits.'    Ulcer Location: left lateral ankle over the malleolus  Measurements: 0 x 0 x 0 cm  Periwound: Intact  Drainage: None.  Pus: None.  Malodor: None.  Base:  100% epithelial tissue.  Signs of infection: None.      Ulcer Location: Dorsal left great toe  Measurements: 0 x 0 x 0 cm  Periwound: Intact  Drainage: None.  Pus: None.  Malodor: None.  Base:  100% epithelial tissue.  Signs of infection: None.     Neurological:      Mental Status: He is alert and oriented to person, place, and time.      Sensory: Sensory deficit present.      Motor: No tremor, atrophy or abnormal muscle tone.      Comments: Negative tinel sign bilateral. Diminished vibratory and protective sensation bilateral.   Psychiatric:         Behavior: Behavior normal.               Assessment:       Encounter Diagnoses   Name Primary?    Type II diabetes mellitus with neurological manifestations Yes    Healed ulcer of left foot on examination          Plan:       Dre was seen today for wound check.    Diagnoses and all orders for this visit:    Type II diabetes mellitus with neurological manifestations    Healed ulcer of left foot on examination      I counseled the patient on his conditions, their implications and medical management.    Shoe inspection. Diabetic Foot Education. Patient reminded of the importance of good nutrition and blood sugar control to help prevent podiatric complications of diabetes. Patient instructed on proper foot hygeine. We discussed wearing proper shoe gear, daily foot inspections, never walking without protective shoe gear, never putting sharp instruments to feet    Wounds well healed watch closely for re ulceration    Return 1 year diabetic foot check sooner PRN if ulcerations recur    Alfredo Shirley DPM

## 2020-06-29 NOTE — PROGRESS NOTES
Subjective:      Patient ID: Dre Short is a 50 y.o. male.    Chief Complaint: Wound Check (left)    Dre is a 50 y.o. male who presents to the clinic for annual evaluation and treatment of diabetic feet. Dre has a past medical history of Colon polyps (7/3/2012), Combined hyperlipidemia associated with type 2 diabetes mellitus, Diabetes mellitus type II, uncontrolled, Diverticulitis of colon (7/3/2012), Fatty liver, Hyperlipidemia, Hypertension associated with diabetes, and Sleep apnea. Patient relates pain in the feet is improved on gabapentin, notices when he sometimes takes 2 in the afternoon it is even better. The plantar foot pain is better with the improved shoes he got as suggested.    5/5/20: Patient presents with cellulitis and new ulceration left lateral ankle. He has been on antibiotics the last week which has helped some with the redness. Here for continued wound care.    5/19/20: Patient returns for continued wound care left lateral ankle, doing well, dressings remained c/d/i. No new pedal complaints.     5/26/20: Patient returns for follow up wound care left ankle and great toe. Dressing remained c/d/i    6/9/20: Patient returns for continued wound care, no new complaints. Dressing remained c/d/i    6/16/20: Patient returns for follow up wound care left ankle and great toe, no new complaints.    PCP: Scott Peñaloza MD    Date Last Seen by PCP: 4/17/19    Current shoe gear: Casual shoes    Hemoglobin A1C   Date Value Ref Range Status   04/16/2019 9.5 (H) <5.7 % of total Hgb Final     Comment:     For someone without known diabetes, a hemoglobin A1c  value of 6.5% or greater indicates that they may have   diabetes and this should be confirmed with a follow-up   test.     For someone with known diabetes, a value <7% indicates   that their diabetes is well controlled and a value   greater than or equal to 7% indicates suboptimal   control. A1c targets should be individualized based on   duration of  diabetes, age, comorbid conditions, and   other considerations.     Currently, no consensus exists regarding use of  hemoglobin A1c for diagnosis of diabetes for children.         11/03/2017 10.7 (H) 4.0 - 5.6 % Final     Comment:     According to ADA guidelines, hemoglobin A1c <7.0% represents  optimal control in non-pregnant diabetic patients. Different  metrics may apply to specific patient populations.   Standards of Medical Care in Diabetes-2016.  For the purpose of screening for the presence of diabetes:  <5.7%     Consistent with the absence of diabetes  5.7-6.4%  Consistent with increasing risk for diabetes   (prediabetes)  >or=6.5%  Consistent with diabetes  Currently, no consensus exists for use of hemoglobin A1c  for diagnosis of diabetes for children.  This Hemoglobin A1c assay has significant interference with fetal   hemoglobin   (HbF). The results are invalid for patients with abnormal amounts of   HbF,   including those with known Hereditary Persistence   of Fetal Hemoglobin. Heterozygous hemoglobin variants (HbAS, HbAC,   HbAD, HbAE, HbA2) do not significantly interfere with this assay;   however, presence of multiple variants in a sample may impact the %   interference.     06/14/2017 12.6 (H) 4.0 - 5.6 % Final     Comment:     According to ADA guidelines, hemoglobin A1c <7.0% represents  optimal control in non-pregnant diabetic patients. Different  metrics may apply to specific patient populations.   Standards of Medical Care in Diabetes-2016.  For the purpose of screening for the presence of diabetes:  <5.7%     Consistent with the absence of diabetes  5.7-6.4%  Consistent with increasing risk for diabetes   (prediabetes)  >or=6.5%  Consistent with diabetes  Currently, no consensus exists for use of hemoglobin A1c  for diagnosis of diabetes for children.  This Hemoglobin A1c assay has significant interference with fetal   hemoglobin   (HbF). The results are invalid for patients with abnormal  amounts of   HbF,   including those with known Hereditary Persistence   of Fetal Hemoglobin. Heterozygous hemoglobin variants (HbAS, HbAC,   HbAD, HbAE, HbA2) do not significantly interfere with this assay;   however, presence of multiple variants in a sample may impact the %   interference.             Review of Systems   Constitution: Negative for chills and fever.   Cardiovascular: Positive for leg swelling. Negative for claudication.   Respiratory: Negative for shortness of breath.    Skin: Positive for nail changes. Negative for itching and rash.   Musculoskeletal: Positive for arthritis. Negative for muscle cramps, muscle weakness and myalgias.   Gastrointestinal: Negative for nausea and vomiting.   Neurological: Positive for numbness and paresthesias. Negative for focal weakness and loss of balance.           Objective:      Physical Exam  Constitutional:       General: He is not in acute distress.     Appearance: He is well-developed. He is not diaphoretic.   Cardiovascular:      Pulses:           Dorsalis pedis pulses are 2+ on the right side and 2+ on the left side.        Posterior tibial pulses are 2+ on the right side and 2+ on the left side.      Comments: < 3 sec capillary refill time to toes 1-5 bilateral. Feet are warm to touch proximally with distal cooling b/l. There is no hair growth on the feet and toes b/l. There is moderate edema b/l. No spider veins or varicosities present b/l.     Musculoskeletal:      Comments: Resolved discomfort to palpation at the plantar metatarsal heads area.    Equinus noted b/l ankles with < 10 deg DF noted. MMT 5/5 in DF/PF/Inv/Ev resistance with no reproduction of pain in any direction. Passive range of motion of ankle and pedal joints is painless b/l.     Skin:     General: Skin is warm and dry.      Coloration: Skin is not pale.      Findings: Lesion present. No abrasion, bruising, burn, ecchymosis, erythema, laceration, petechiae or rash.      Nails: There is no  clubbing.        Comments: Skin temperature, texture and turgor within normal limits.'    Ulcer Location: left lateral ankle over the malleolus  Measurements: pre Covered in eschar, post: 0.2x0.1x0.1 cm  Periwound: Intact  Drainage: serosanguinous.  Pus: None.  Malodor: None.  Base:  100% granular. 0% fibrin.  Signs of infection: None.    Ulcer Location: Dorsal left great toe  Measurements: pre covered in eschar 0.4x0.1x0.1  Periwound: Intact  Drainage: serosanguinous.  Pus: None.  Malodor: None.  Base:  granular  Signs of infection: None.             Neurological:      Mental Status: He is alert and oriented to person, place, and time.      Sensory: Sensory deficit present.      Motor: No tremor, atrophy or abnormal muscle tone.      Comments: Negative tinel sign bilateral. Diminished vibratory and protective sensation bilateral.   Psychiatric:         Behavior: Behavior normal.               Assessment:       Encounter Diagnoses   Name Primary?    Ankle ulcer, left, with fat layer exposed Yes    Skin ulcer of left great toe with fat layer exposed     Type II diabetes mellitus with neurological manifestations     Type II diabetes mellitus with peripheral circulatory disorder          Plan:       Dre was seen today for wound check.    Diagnoses and all orders for this visit:    Ankle ulcer, left, with fat layer exposed  -     Wound Debridement    Skin ulcer of left great toe with fat layer exposed  -     Wound Debridement    Type II diabetes mellitus with neurological manifestations    Type II diabetes mellitus with peripheral circulatory disorder      I counseled the patient on his conditions, their implications and medical management.    Shoe inspection. Diabetic Foot Education. Patient reminded of the importance of good nutrition and blood sugar control to help prevent podiatric complications of diabetes. Patient instructed on proper foot hygeine. We discussed wearing proper shoe gear, daily foot  inspections, never walking without protective shoe gear, never putting sharp instruments to feet    Ulcer was debrided dressed with rashmi and aperture with modified football    Return in 1 week wound care    Alfredo Shirley DPM

## 2020-06-29 NOTE — PROCEDURES
"Wound Debridement    Date/Time: 6/16/2020 2:00 PM  Performed by: Alfredo Shirley DPM  Authorized by: Alfredo Shirley DPM     Time out: Immediately prior to procedure a "time out" was called to verify the correct patient, procedure, equipment, support staff and site/side marked as required.    Consent Done?:  Yes (Verbal)  Local anesthesia used?: No      Wound Details:    Location:  Left foot    Location:  Left 1st Toe    Type of Debridement:  Excisional       Length (cm):  0.4       Area (sq cm):  0.04       Width (cm):  0.1       Percent Debrided (%):  100       Depth (cm):  0.1       Total Area Debrided (sq cm):  0.04    Depth of debridement:  Subcutaneous tissue    Devitalized tissue debrided:  Necrotic/Eschar and Slough    Instruments:  Curette    Additional wounds:  1    2nd Wound Details:     Location:  Left foot    Location:  Left Ankle    Location:  Left Ankle    Type of Debridement:  Excisional       Length (cm):  0.2       Area (sq cm):  0.02       Width (cm):  0.1       Percent Debrided (%):  100       Depth (cm):  0.1       Total Area Debrided (sq cm):  0.02    Depth of debridement:  Subcutaneous tissue    Devitalized tissue debrided:  Necrotic/Eschar and Slough    Instruments:  Curette    Bleeding:  Minimal  Hemostasis Achieved: Yes    Method Used:  Pressure  Patient tolerance:  Patient tolerated the procedure well with no immediate complications      "

## 2020-10-12 ENCOUNTER — TELEPHONE (OUTPATIENT)
Dept: FAMILY MEDICINE | Facility: CLINIC | Age: 50
End: 2020-10-12

## 2020-10-12 NOTE — TELEPHONE ENCOUNTER
Outreach to patient in reference to PCP appointment.    Advised patient that he was due for a follow up with Dr. Peñaloza.    Patient declined to make an appointment at this time.  He stated that he would call back to make an appointment.  He does not monitor his blood pressure at home.

## 2020-11-09 ENCOUNTER — PATIENT MESSAGE (OUTPATIENT)
Dept: FAMILY MEDICINE | Facility: CLINIC | Age: 50
End: 2020-11-09

## 2020-11-09 ENCOUNTER — PATIENT MESSAGE (OUTPATIENT)
Dept: PODIATRY | Facility: CLINIC | Age: 50
End: 2020-11-09

## 2020-11-09 DIAGNOSIS — E11.49 TYPE II DIABETES MELLITUS WITH NEUROLOGICAL MANIFESTATIONS: Primary | ICD-10-CM

## 2020-11-09 DIAGNOSIS — Z00.00 PREVENTATIVE HEALTH CARE: Primary | ICD-10-CM

## 2020-11-09 RX ORDER — GABAPENTIN 300 MG/1
600 CAPSULE ORAL 2 TIMES DAILY
Qty: 360 CAPSULE | Refills: 3 | Status: SHIPPED | OUTPATIENT
Start: 2020-11-09 | End: 2021-10-25

## 2020-11-09 NOTE — TELEPHONE ENCOUNTER
Please see [employer] message and advise    Requesting labs and urine for his apt with you and all sent to [facility].     Thank you

## 2020-11-10 ENCOUNTER — PATIENT OUTREACH (OUTPATIENT)
Dept: ADMINISTRATIVE | Facility: HOSPITAL | Age: 50
End: 2020-11-10

## 2020-11-10 NOTE — PROGRESS NOTES
11/10/2020 Care Everywhere updates requested and reviewed.  Immunizations reconciled. Media reports reviewed.  Duplicate HM overrides and  orders removed.  Overdue HM topic chart audit and/or requested.  Overdue lab testing linked to upcoming lab appointments if applies.    LINKS DOWN 11/10/2020    Lab brennan, and [employer] reviewed    Lab testing           Health Maintenance Due   Topic Date Due    Shingles Vaccine (1 of 2) 2019    Hemoglobin A1c  2019    Urine Microalbumin  2020    Lipid Panel  2020    Colorectal Cancer Screening  2020    Influenza Vaccine (1) 2020    Eye Exam  2020    Foot Exam  2020

## 2020-11-25 ENCOUNTER — OFFICE VISIT (OUTPATIENT)
Dept: FAMILY MEDICINE | Facility: CLINIC | Age: 50
End: 2020-11-25
Payer: COMMERCIAL

## 2020-11-25 VITALS
BODY MASS INDEX: 38.31 KG/M2 | SYSTOLIC BLOOD PRESSURE: 134 MMHG | WEIGHT: 298.5 LBS | OXYGEN SATURATION: 97 % | HEIGHT: 74 IN | HEART RATE: 89 BPM | TEMPERATURE: 98 F | DIASTOLIC BLOOD PRESSURE: 98 MMHG

## 2020-11-25 DIAGNOSIS — Z12.11 ENCOUNTER FOR SCREENING FOR MALIGNANT NEOPLASM OF COLON: ICD-10-CM

## 2020-11-25 DIAGNOSIS — E11.3293 NON-PROLIFERATIVE DIABETIC RETINOPATHY, BOTH EYES: ICD-10-CM

## 2020-11-25 DIAGNOSIS — I10 ESSENTIAL HYPERTENSION: ICD-10-CM

## 2020-11-25 DIAGNOSIS — E11.42 DIABETIC POLYNEUROPATHY ASSOCIATED WITH TYPE 2 DIABETES MELLITUS: ICD-10-CM

## 2020-11-25 DIAGNOSIS — E11.65 UNCONTROLLED TYPE 2 DIABETES MELLITUS WITH HYPERGLYCEMIA: Primary | ICD-10-CM

## 2020-11-25 DIAGNOSIS — E78.2 MIXED HYPERLIPIDEMIA: ICD-10-CM

## 2020-11-25 LAB
ALBUMIN SERPL-MCNC: 4 G/DL (ref 3.6–5.1)
ALBUMIN/GLOB SERPL: 1.1 (CALC) (ref 1–2.5)
ALP SERPL-CCNC: 79 U/L (ref 35–144)
ALT SERPL-CCNC: 40 U/L (ref 9–46)
AST SERPL-CCNC: 37 U/L (ref 10–35)
BASOPHILS # BLD AUTO: 39 CELLS/UL (ref 0–200)
BASOPHILS NFR BLD AUTO: 0.5 %
BILIRUB SERPL-MCNC: 0.8 MG/DL (ref 0.2–1.2)
BUN SERPL-MCNC: 17 MG/DL (ref 7–25)
BUN/CREAT SERPL: ABNORMAL (CALC) (ref 6–22)
CALCIUM SERPL-MCNC: 9.1 MG/DL (ref 8.6–10.3)
CHLORIDE SERPL-SCNC: 101 MMOL/L (ref 98–110)
CHOLEST SERPL-MCNC: 173 MG/DL
CHOLEST/HDLC SERPL: 5.2 (CALC)
CO2 SERPL-SCNC: 29 MMOL/L (ref 20–32)
CREAT SERPL-MCNC: 0.88 MG/DL (ref 0.7–1.33)
EOSINOPHIL # BLD AUTO: 77 CELLS/UL (ref 15–500)
EOSINOPHIL NFR BLD AUTO: 1 %
ERYTHROCYTE [DISTWIDTH] IN BLOOD BY AUTOMATED COUNT: 15 % (ref 11–15)
GFRSERPLBLD MDRD-ARVRAT: 99 ML/MIN/1.73M2
GLOBULIN SER CALC-MCNC: 3.5 G/DL (CALC) (ref 1.9–3.7)
GLUCOSE SERPL-MCNC: 214 MG/DL (ref 65–99)
HBA1C MFR BLD: 10.9 % OF TOTAL HGB
HCT VFR BLD AUTO: 52.1 % (ref 38.5–50)
HDLC SERPL-MCNC: 33 MG/DL
HGB BLD-MCNC: 16.8 G/DL (ref 13.2–17.1)
LDLC SERPL CALC-MCNC: 108 MG/DL (CALC)
LYMPHOCYTES # BLD AUTO: 2418 CELLS/UL (ref 850–3900)
LYMPHOCYTES NFR BLD AUTO: 31.4 %
MCH RBC QN AUTO: 27.8 PG (ref 27–33)
MCHC RBC AUTO-ENTMCNC: 32.2 G/DL (ref 32–36)
MCV RBC AUTO: 86.1 FL (ref 80–100)
MONOCYTES # BLD AUTO: 601 CELLS/UL (ref 200–950)
MONOCYTES NFR BLD AUTO: 7.8 %
NEUTROPHILS # BLD AUTO: 4566 CELLS/UL (ref 1500–7800)
NEUTROPHILS NFR BLD AUTO: 59.3 %
NONHDLC SERPL-MCNC: 140 MG/DL (CALC)
PLATELET # BLD AUTO: 180 THOUSAND/UL (ref 140–400)
PMV BLD REES-ECKER: 12 FL (ref 7.5–12.5)
POTASSIUM SERPL-SCNC: 3.9 MMOL/L (ref 3.5–5.3)
PROT SERPL-MCNC: 7.5 G/DL (ref 6.1–8.1)
PSA SERPL-MCNC: 0.2 NG/ML
RBC # BLD AUTO: 6.05 MILLION/UL (ref 4.2–5.8)
SODIUM SERPL-SCNC: 141 MMOL/L (ref 135–146)
TRIGL SERPL-MCNC: 196 MG/DL
TSH SERPL-ACNC: 1.46 MIU/L (ref 0.4–4.5)
WBC # BLD AUTO: 7.7 THOUSAND/UL (ref 3.8–10.8)

## 2020-11-25 PROCEDURE — 99999 PR PBB SHADOW E&M-EST. PATIENT-LVL V: CPT | Mod: PBBFAC,,, | Performed by: NURSE PRACTITIONER

## 2020-11-25 PROCEDURE — 3008F BODY MASS INDEX DOCD: CPT | Mod: CPTII,S$GLB,, | Performed by: NURSE PRACTITIONER

## 2020-11-25 PROCEDURE — 1126F PR PAIN SEVERITY QUANTIFIED, NO PAIN PRESENT: ICD-10-PCS | Mod: S$GLB,,, | Performed by: NURSE PRACTITIONER

## 2020-11-25 PROCEDURE — 3080F PR MOST RECENT DIASTOLIC BLOOD PRESSURE >= 90 MM HG: ICD-10-PCS | Mod: CPTII,S$GLB,, | Performed by: NURSE PRACTITIONER

## 2020-11-25 PROCEDURE — 3046F PR MOST RECENT HEMOGLOBIN A1C LEVEL > 9.0%: ICD-10-PCS | Mod: CPTII,S$GLB,, | Performed by: NURSE PRACTITIONER

## 2020-11-25 PROCEDURE — 3046F HEMOGLOBIN A1C LEVEL >9.0%: CPT | Mod: CPTII,S$GLB,, | Performed by: NURSE PRACTITIONER

## 2020-11-25 PROCEDURE — 99214 PR OFFICE/OUTPT VISIT, EST, LEVL IV, 30-39 MIN: ICD-10-PCS | Mod: 25,S$GLB,, | Performed by: NURSE PRACTITIONER

## 2020-11-25 PROCEDURE — 90686 IIV4 VACC NO PRSV 0.5 ML IM: CPT | Mod: S$GLB,,, | Performed by: NURSE PRACTITIONER

## 2020-11-25 PROCEDURE — 90686 FLU VACCINE (QUAD) GREATER THAN OR EQUAL TO 3YO PRESERVATIVE FREE IM: ICD-10-PCS | Mod: S$GLB,,, | Performed by: NURSE PRACTITIONER

## 2020-11-25 PROCEDURE — 99214 OFFICE O/P EST MOD 30 MIN: CPT | Mod: 25,S$GLB,, | Performed by: NURSE PRACTITIONER

## 2020-11-25 PROCEDURE — 90471 FLU VACCINE (QUAD) GREATER THAN OR EQUAL TO 3YO PRESERVATIVE FREE IM: ICD-10-PCS | Mod: S$GLB,,, | Performed by: NURSE PRACTITIONER

## 2020-11-25 PROCEDURE — 90471 IMMUNIZATION ADMIN: CPT | Mod: S$GLB,,, | Performed by: NURSE PRACTITIONER

## 2020-11-25 PROCEDURE — 3008F PR BODY MASS INDEX (BMI) DOCUMENTED: ICD-10-PCS | Mod: CPTII,S$GLB,, | Performed by: NURSE PRACTITIONER

## 2020-11-25 PROCEDURE — 1126F AMNT PAIN NOTED NONE PRSNT: CPT | Mod: S$GLB,,, | Performed by: NURSE PRACTITIONER

## 2020-11-25 PROCEDURE — 3075F PR MOST RECENT SYSTOLIC BLOOD PRESS GE 130-139MM HG: ICD-10-PCS | Mod: CPTII,S$GLB,, | Performed by: NURSE PRACTITIONER

## 2020-11-25 PROCEDURE — 3075F SYST BP GE 130 - 139MM HG: CPT | Mod: CPTII,S$GLB,, | Performed by: NURSE PRACTITIONER

## 2020-11-25 PROCEDURE — 3080F DIAST BP >= 90 MM HG: CPT | Mod: CPTII,S$GLB,, | Performed by: NURSE PRACTITIONER

## 2020-11-25 PROCEDURE — 99999 PR PBB SHADOW E&M-EST. PATIENT-LVL V: ICD-10-PCS | Mod: PBBFAC,,, | Performed by: NURSE PRACTITIONER

## 2020-11-25 RX ORDER — ATORVASTATIN CALCIUM 40 MG/1
TABLET, FILM COATED ORAL
Qty: 90 TABLET | Refills: 1 | Status: SHIPPED | OUTPATIENT
Start: 2020-11-25

## 2020-11-25 RX ORDER — LIRAGLUTIDE 6 MG/ML
1.8 INJECTION SUBCUTANEOUS DAILY
Qty: 9 ML | Refills: 1 | Status: SHIPPED | OUTPATIENT
Start: 2020-11-25 | End: 2021-01-19 | Stop reason: SDUPTHER

## 2020-11-25 RX ORDER — METFORMIN HYDROCHLORIDE 500 MG/1
500 TABLET, EXTENDED RELEASE ORAL 2 TIMES DAILY WITH MEALS
Qty: 180 TABLET | Refills: 1 | Status: SHIPPED | OUTPATIENT
Start: 2020-11-25 | End: 2020-12-07

## 2020-11-25 RX ORDER — ENALAPRIL MALEATE 20 MG/1
20 TABLET ORAL DAILY
Qty: 90 TABLET | Refills: 1 | Status: SHIPPED | OUTPATIENT
Start: 2020-11-25 | End: 2021-11-25

## 2020-11-25 RX ORDER — INSULIN GLARGINE 100 [IU]/ML
INJECTION, SOLUTION SUBCUTANEOUS
Qty: 15 ML | Refills: 1 | Status: SHIPPED | OUTPATIENT
Start: 2020-11-25 | End: 2020-12-07

## 2020-11-25 NOTE — PROGRESS NOTES
Subjective:       Patient ID: Dre Short is a 50 y.o. male.    Chief Complaint: Diabetes (follow up )    HPI   Patient presents for follow up of chronic conditions. He has not been seen in clinic since April 2019     He has been out of Victoza, enalapril, atorvastatin, has not been taking fast acting insulin as prescribed.     HTN: mildly elevated today. Resume enalapril   T2DM: uncontrolled, A1C 10.9. +neurpathy and retinopathy. Previously followed by endo-LOV 2018, lost to follow up. Has eye appointment scheduled in December with Dr. Perkins in [place] ([facility])   HLD: resume statin     HCM  Flu vaccine today  Colonoscopy 2015: polyp; due for repeat   Vitals:    11/25/20 0838   BP: (!) 134/98   Pulse: 89   Temp: 98.4 °F (36.9 °C)     Review of Systems   Constitutional: Negative for diaphoresis and fever.   HENT: Negative for facial swelling and trouble swallowing.    Eyes: Negative for discharge and redness.   Respiratory: Negative for cough and shortness of breath.    Cardiovascular: Negative for chest pain and palpitations.   Gastrointestinal: Negative for abdominal pain.   Genitourinary: Negative for difficulty urinating.   Musculoskeletal: Negative for gait problem.   Skin: Negative for pallor and rash.   Neurological: Positive for numbness. Negative for facial asymmetry and speech difficulty.   Psychiatric/Behavioral: Negative for confusion. The patient is not nervous/anxious.        Past Medical History:   Diagnosis Date    Colon polyps 7/3/2012    Combined hyperlipidemia associated with type 2 diabetes mellitus     Diabetes mellitus type II, uncontrolled     Diverticulitis of colon 7/3/2012    Fatty liver     Hyperlipidemia     Hypertension associated with diabetes     Sleep apnea     compliant with CPAP     Objective:      Physical Exam  Vitals signs and nursing note reviewed.   Constitutional:       General: He is not in acute distress.     Appearance: He is not diaphoretic.   HENT:       Head: Normocephalic.      Right Ear: Hearing normal.      Left Ear: Hearing normal.      Nose: Nose normal.   Eyes:      General: Lids are normal.      Conjunctiva/sclera: Conjunctivae normal.   Neck:      Vascular: No JVD.      Trachea: No tracheal deviation.   Cardiovascular:      Rate and Rhythm: Normal rate.      Heart sounds: Normal heart sounds.   Pulmonary:      Effort: Pulmonary effort is normal.      Breath sounds: Normal breath sounds.   Abdominal:      General: There is no distension.   Musculoskeletal:         General: No deformity.      Right lower leg: No edema.      Left lower leg: No edema.   Skin:     Coloration: Skin is not pale.   Neurological:      Mental Status: He is alert and oriented to person, place, and time.   Psychiatric:         Speech: Speech normal.         Behavior: Behavior normal.         Thought Content: Thought content normal.         Judgment: Judgment normal.         Assessment:       1. Uncontrolled type 2 diabetes mellitus with hyperglycemia    2. Non-proliferative diabetic retinopathy, both eyes    3. Diabetic polyneuropathy associated with type 2 diabetes mellitus    4. Essential hypertension    5. Mixed hyperlipidemia    6. Encounter for screening for malignant neoplasm of colon        Plan:       Uncontrolled type 2 diabetes mellitus with hyperglycemia  -     Ambulatory referral/consult to Endocrinology; Future; Expected date: 12/02/2020  -     Cancel: Diabetic Eye Screening Photo; Future  -     liraglutide 0.6 mg/0.1 mL, 18 mg/3 mL, subq PNIJ (VICTOZA 3-BRITTANY) 0.6 mg/0.1 mL (18 mg/3 mL) PnIj pen; Inject 1.8 mg into the skin once daily.  Dispense: 9 mL; Refill: 1  -     insulin (LANTUS SOLOSTAR U-100 INSULIN) glargine 100 units/mL (3mL) SubQ pen; INJECT 50 UNITS INTO THE SKIN ONCE DAILY  Dispense: 15 mL; Refill: 1  -     metFORMIN (GLUCOPHAGE-XR) 500 MG ER 24hr tablet; Take 1 tablet (500 mg total) by mouth 2 (two) times daily with meals.  Dispense: 180 tablet; Refill:  1  -     Comprehensive Metabolic Panel; Future; Expected date: 02/25/2021  -     Hemoglobin A1C; Future; Expected date: 02/25/2021  -     MICROALBUMIN / CREATININE RATIO URINE; Future; Expected date: 02/25/2021    Non-proliferative diabetic retinopathy, both eyes    Diabetic polyneuropathy associated with type 2 diabetes mellitus    Essential hypertension  -     enalapril (VASOTEC) 20 MG tablet; Take 1 tablet (20 mg total) by mouth once daily.  Dispense: 90 tablet; Refill: 1  -     CBC Auto Differential; Future; Expected date: 02/25/2021    Mixed hyperlipidemia  -     atorvastatin (LIPITOR) 40 MG tablet; TAKE 1 TABLET(40 MG) BY MOUTH EVERY DAY  Dispense: 90 tablet; Refill: 1  -     Lipid Panel; Future; Expected date: 02/25/2021    Encounter for screening for malignant neoplasm of colon  -     Case Request Endoscopy: COLONOSCOPY        Schedule with endo  FU with podiatry and optometry as scheduled  3 months with PCP labs prior      Medication List with Changes/Refills   Current Medications    ALPHA LIPOIC ACID 600 MG CAP    Take by mouth.    B COMPLEX VITAMINS CAPSULE    Take 1 capsule by mouth once daily.    BLOOD SUGAR DIAGNOSTIC STRP    To check BG 4 times daily, to use with insurance preferred meter    BLOOD-GLUCOSE METER KIT    To check BG 4 times daily, to use with insurance preferred meter    GABAPENTIN (NEURONTIN) 300 MG CAPSULE    Take 2 capsules (600 mg total) by mouth 2 (two) times daily.    HYDROCODONE-ACETAMINOPHEN (NORCO) 7.5-325 MG PER TABLET    TK 1 T PO Q 6 H PRN P    INSULIN LISPRO 100 UNIT/ML PEN    Inject 18 Units into the skin 3 (three) times daily before meals. Plus correction scale, max TDD 84 units    LANCETS MISC    by Misc.(Non-Drug; Combo Route) route. One Touch Ultra Mini Lancets test bid    LANCETS MISC    To check BG 4 times daily, to use with insurance preferred meter    OMEGA-3 FATTY ACIDS-VITAMIN E (FISH OIL) 1,000 MG CAP    Take by mouth. 4000 mg daily    PEN NEEDLE, DIABETIC (BD  "ULTRA-FINE ORIG PEN NEEDLE) 29 GAUGE X 1/2" NDLE    USE FIVE TIMES DAILY AS DIRECTED    UNABLE TO FIND    APPLY UP TO 3.2 GRAMS (2 PUMPS) TO PAINFUL AREAS UP TO FIVE TIMES DAILY.    UNABLE TO FIND    APPLY UP TO 3.2 GRAMS (2 PUMPS) TO PAINFUL AREAS UP TO FIVE TIMES DAILY.   Changed and/or Refilled Medications    Modified Medication Previous Medication    ATORVASTATIN (LIPITOR) 40 MG TABLET atorvastatin (LIPITOR) 40 MG tablet       TAKE 1 TABLET(40 MG) BY MOUTH EVERY DAY    TAKE 1 TABLET(40 MG) BY MOUTH EVERY DAY    ENALAPRIL (VASOTEC) 20 MG TABLET enalapril (VASOTEC) 20 MG tablet       Take 1 tablet (20 mg total) by mouth once daily.    Take 1 tablet (20 mg total) by mouth once daily.    INSULIN (LANTUS SOLOSTAR U-100 INSULIN) GLARGINE 100 UNITS/ML (3ML) SUBQ PEN insulin (LANTUS SOLOSTAR U-100 INSULIN) glargine 100 units/mL (3mL) SubQ pen       INJECT 50 UNITS INTO THE SKIN ONCE DAILY    INJECT 50 UNITS INTO THE SKIN ONCE DAILY    LIRAGLUTIDE 0.6 MG/0.1 ML, 18 MG/3 ML, SUBQ PNIJ (VICTOZA 3-BRITTANY) 0.6 MG/0.1 ML (18 MG/3 ML) PNIJ PEN liraglutide 0.6 mg/0.1 mL, 18 mg/3 mL, subq PNIJ (VICTOZA 3-BRITTANY) 0.6 mg/0.1 mL (18 mg/3 mL) PnIj       Inject 1.8 mg into the skin once daily.    Inject 1.8 mg into the skin once daily.    METFORMIN (GLUCOPHAGE-XR) 500 MG ER 24HR TABLET metFORMIN (GLUCOPHAGE-XR) 500 MG 24 hr tablet       Take 1 tablet (500 mg total) by mouth 2 (two) times daily with meals.    Take 1 tablet (500 mg total) by mouth 2 (two) times daily with meals.   Discontinued Medications    FENOFIBRATE (TRICOR) 54 MG TABLET    TAKE 1 TABLET(54 MG) BY MOUTH EVERY DAY    MUPIROCIN (BACTROBAN) 2 % OINTMENT    Apply topically once daily.         "

## 2020-12-03 ENCOUNTER — PATIENT OUTREACH (OUTPATIENT)
Dept: ADMINISTRATIVE | Facility: OTHER | Age: 50
End: 2020-12-03

## 2020-12-03 NOTE — PROGRESS NOTES
Health Maintenance Due   Topic Date Due    Shingles Vaccine (1 of 2) 04/01/2019    Colorectal Cancer Screening  07/06/2020    Eye Exam  08/05/2020    Foot Exam  08/14/2020     Updates were requested from care everywhere.  Chart was reviewed for overdue Proactive [facility] Encounters (JIMMY) topics (CRS, Breast Cancer Screening, Eye exam)  Health Maintenance has been updated.  LINKS immunization registry triggered.  LINKS not responding.       
Anemia

## 2020-12-07 ENCOUNTER — OFFICE VISIT (OUTPATIENT)
Dept: ENDOCRINOLOGY | Facility: CLINIC | Age: 50
End: 2020-12-07
Payer: COMMERCIAL

## 2020-12-07 ENCOUNTER — PATIENT OUTREACH (OUTPATIENT)
Dept: ADMINISTRATIVE | Facility: HOSPITAL | Age: 50
End: 2020-12-07

## 2020-12-07 VITALS
DIASTOLIC BLOOD PRESSURE: 80 MMHG | SYSTOLIC BLOOD PRESSURE: 124 MMHG | RESPIRATION RATE: 18 BRPM | BODY MASS INDEX: 37.35 KG/M2 | WEIGHT: 300.38 LBS | HEIGHT: 75 IN | HEART RATE: 88 BPM

## 2020-12-07 DIAGNOSIS — E11.42 TYPE 2 DIABETES MELLITUS WITH DIABETIC POLYNEUROPATHY, WITH LONG-TERM CURRENT USE OF INSULIN: Primary | ICD-10-CM

## 2020-12-07 DIAGNOSIS — K76.0 FATTY LIVER: ICD-10-CM

## 2020-12-07 DIAGNOSIS — E78.2 MIXED HYPERLIPIDEMIA: ICD-10-CM

## 2020-12-07 DIAGNOSIS — Z79.4 TYPE 2 DIABETES MELLITUS WITH DIABETIC POLYNEUROPATHY, WITH LONG-TERM CURRENT USE OF INSULIN: Primary | ICD-10-CM

## 2020-12-07 DIAGNOSIS — I10 ESSENTIAL HYPERTENSION: ICD-10-CM

## 2020-12-07 DIAGNOSIS — E66.01 CLASS 2 SEVERE OBESITY DUE TO EXCESS CALORIES WITH SERIOUS COMORBIDITY AND BODY MASS INDEX (BMI) OF 37.0 TO 37.9 IN ADULT: ICD-10-CM

## 2020-12-07 PROBLEM — E11.49 TYPE II DIABETES MELLITUS WITH NEUROLOGICAL MANIFESTATIONS: Status: RESOLVED | Noted: 2019-08-14 | Resolved: 2020-12-07

## 2020-12-07 PROCEDURE — 3008F BODY MASS INDEX DOCD: CPT | Mod: CPTII,S$GLB,, | Performed by: INTERNAL MEDICINE

## 2020-12-07 PROCEDURE — 99999 PR PBB SHADOW E&M-EST. PATIENT-LVL IV: CPT | Mod: PBBFAC,,, | Performed by: INTERNAL MEDICINE

## 2020-12-07 PROCEDURE — 3079F DIAST BP 80-89 MM HG: CPT | Mod: CPTII,S$GLB,, | Performed by: INTERNAL MEDICINE

## 2020-12-07 PROCEDURE — 3046F HEMOGLOBIN A1C LEVEL >9.0%: CPT | Mod: CPTII,S$GLB,, | Performed by: INTERNAL MEDICINE

## 2020-12-07 PROCEDURE — 3074F SYST BP LT 130 MM HG: CPT | Mod: CPTII,S$GLB,, | Performed by: INTERNAL MEDICINE

## 2020-12-07 PROCEDURE — 3074F PR MOST RECENT SYSTOLIC BLOOD PRESSURE < 130 MM HG: ICD-10-PCS | Mod: CPTII,S$GLB,, | Performed by: INTERNAL MEDICINE

## 2020-12-07 PROCEDURE — 99999 PR PBB SHADOW E&M-EST. PATIENT-LVL IV: ICD-10-PCS | Mod: PBBFAC,,, | Performed by: INTERNAL MEDICINE

## 2020-12-07 PROCEDURE — 99214 PR OFFICE/OUTPT VISIT, EST, LEVL IV, 30-39 MIN: ICD-10-PCS | Mod: S$GLB,,, | Performed by: INTERNAL MEDICINE

## 2020-12-07 PROCEDURE — 3046F PR MOST RECENT HEMOGLOBIN A1C LEVEL > 9.0%: ICD-10-PCS | Mod: CPTII,S$GLB,, | Performed by: INTERNAL MEDICINE

## 2020-12-07 PROCEDURE — 3008F PR BODY MASS INDEX (BMI) DOCUMENTED: ICD-10-PCS | Mod: CPTII,S$GLB,, | Performed by: INTERNAL MEDICINE

## 2020-12-07 PROCEDURE — 1126F AMNT PAIN NOTED NONE PRSNT: CPT | Mod: S$GLB,,, | Performed by: INTERNAL MEDICINE

## 2020-12-07 PROCEDURE — 1126F PR PAIN SEVERITY QUANTIFIED, NO PAIN PRESENT: ICD-10-PCS | Mod: S$GLB,,, | Performed by: INTERNAL MEDICINE

## 2020-12-07 PROCEDURE — 3079F PR MOST RECENT DIASTOLIC BLOOD PRESSURE 80-89 MM HG: ICD-10-PCS | Mod: CPTII,S$GLB,, | Performed by: INTERNAL MEDICINE

## 2020-12-07 PROCEDURE — 99214 OFFICE O/P EST MOD 30 MIN: CPT | Mod: S$GLB,,, | Performed by: INTERNAL MEDICINE

## 2020-12-07 RX ORDER — INSULIN PUMP SYRINGE, 3 ML
EACH MISCELLANEOUS
Qty: 1 EACH | Refills: 1 | Status: SHIPPED | OUTPATIENT
Start: 2020-12-07 | End: 2022-07-18

## 2020-12-07 RX ORDER — METFORMIN HYDROCHLORIDE 500 MG/1
1000 TABLET, EXTENDED RELEASE ORAL 2 TIMES DAILY WITH MEALS
Qty: 360 TABLET | Refills: 3 | Status: SHIPPED | OUTPATIENT
Start: 2020-12-07 | End: 2021-01-19 | Stop reason: SDUPTHER

## 2020-12-07 RX ORDER — INSULIN GLARGINE 100 [IU]/ML
INJECTION, SOLUTION SUBCUTANEOUS
Qty: 45 ML | Refills: 4 | Status: SHIPPED | OUTPATIENT
Start: 2020-12-07 | End: 2021-01-19 | Stop reason: SDUPTHER

## 2020-12-07 RX ORDER — PEN NEEDLE, DIABETIC 29 G X1/2"
NEEDLE, DISPOSABLE MISCELLANEOUS
Qty: 400 EACH | Refills: 3 | Status: SHIPPED | OUTPATIENT
Start: 2020-12-07

## 2020-12-07 RX ORDER — EZETIMIBE 10 MG/1
10 TABLET ORAL DAILY
Qty: 90 TABLET | Refills: 3 | Status: CANCELLED | OUTPATIENT
Start: 2020-12-07 | End: 2021-12-07

## 2020-12-07 RX ORDER — INSULIN LISPRO 100 [IU]/ML
12 INJECTION, SOLUTION INTRAVENOUS; SUBCUTANEOUS
Qty: 3 BOX | Refills: 6 | Status: SHIPPED | OUTPATIENT
Start: 2020-12-07 | End: 2021-01-19 | Stop reason: SDUPTHER

## 2020-12-07 NOTE — PATIENT INSTRUCTIONS
Change Lantus to 35 units daily    Start Humalog 12 units  Before each meal ADD SSI if premeal blood sugar more than 150.    Continue Metformin. Increase as discussed to 1000 mg twice daily.    Continue Victoza daily.     Check blood sugar four times daily.     Resume fish oil.     Follow up with podiatry for your toe.

## 2020-12-07 NOTE — PROGRESS NOTES
Non-compliant GAP report chart review - Chart review completed for the following HM test if overdue  , EYE EXAM  Care Everywhere and Media reports - updates requested and reviewed.      Recently reviewed/outreached

## 2020-12-07 NOTE — PROGRESS NOTES
Subjective:    Patient ID:  Dre Short is a 50 y.o. male.    Chief Complaint:  Diabetes      Pt presents to follow up Type 2 DM and review of chronic medical conditions as listed in the visit diagnosis section of this encounter. Pt previously seen by REILLY Palma but is new to me.     Overview: diagnosed with Type 2 DM in 2010. He was diagnosed based on lab work. Initial treatment consisted of metformin. Insulin was added in 2014. Victoza initiated in 11/2017.  + FH of DM in mother, maternal GF. Denies hospitalizations due to DM but had ER visit upon diagnosis.      Known diabetic complications: none  History of DKA: no  Cardiovascular risk factors: diabetes mellitus, dyslipidemia, hypertension, male gender and obesity (BMI >= 30 kg/m2)     Currently taking:  Lantus 50   Victoza 1.8 mg. No GI sx's. Ran out for a few months during COVID.  Metformin 500 mg BID. (no GI sx's). Does not recall being told to increase dose.    Previously took Humalog in the past and sometimes does SSI. Doesn't like the frequent finger sticks.    No history of bladder cancer or significant LE swelling. No history of recurrent UTI or yeast infection. No hx of pancreatitis. Never tried CGM.     HbA1C 10.9% Nov 2020, egfr 99    Home blood sugar records:   Fasting -  Pre-lunch 180-200  Pre-dinner 200s  Bedtime -    Current diet: on average, 1-2 meals per day. Snacks in between on peanuts, popcorn, and cookies. Drinks some OJ.  Current exercise: walking  Any episodes of hypoglycemia? no  Last eye exam: last about 1 year ago. Has appt on Thursday.    HLD. On Statin. Ran out of fish oil. Recent labs with AST increased slightly but trending down.          Diabetes Management Status    Statin: Taking  ACE/ARB: Taking    Screening or Prevention Patient's value Goal Complete/Controlled?   HgA1C Testing and Control   Lab Results   Component Value Date    HGBA1C 10.9 (H) 11/24/2020      Annually/Less than 8% No   Lipid profile : 11/24/2020  Annually Yes   LDL control Lab Results   Component Value Date    LDLCALC 108 (H) 11/24/2020    Annually/Less than 100 mg/dl  No   Nephropathy screening Lab Results   Component Value Date    LABMICR 8.0 06/14/2017     No results found for: PROTEINUA Annually No   Blood pressure BP Readings from Last 1 Encounters:   12/07/20 124/80    Less than 140/90 No   Dilated retinal exam : 08/05/2019 Annually No   Foot exam   : 08/14/2019 Annually No     Review of Systems   Constitutional: Negative for fever and unexpected weight change.   Eyes: Negative for visual disturbance.   Respiratory: Negative for shortness of breath.    Cardiovascular: Negative for chest pain and leg swelling.   Gastrointestinal: Negative for abdominal pain, constipation, diarrhea, nausea and vomiting.   Endocrine: Negative for polydipsia, polyphagia and polyuria.   Musculoskeletal: Negative for myalgias.   Skin: Positive for wound.        Has some ant bites on ankles after trimming shrubs.   Neurological: Negative for numbness and headaches.   Psychiatric/Behavioral: Negative for sleep disturbance.        Past Medical History:   Diagnosis Date    Colon polyps 7/3/2012    Combined hyperlipidemia associated with type 2 diabetes mellitus     Diabetes mellitus type II, uncontrolled     Diverticulitis of colon 7/3/2012    Fatty liver     Hyperlipidemia     Hypertension associated with diabetes     Sleep apnea     compliant with CPAP      Social History     Tobacco Use    Smoking status: Never Smoker    Smokeless tobacco: Never Used   Substance Use Topics    Alcohol use: No     Frequency: Never     Binge frequency: Never    Drug use: No     Family History   Problem Relation Age of Onset    Diabetes Mother     Cancer Maternal Grandfather         colon    Diabetes Maternal Grandfather       Past Surgical History:   Procedure Laterality Date    MS COLONOSCOPY,REMV LESN,SNARE  7/3/2012         WISDOM TOOTH EXTRACTION            Current  "Outpatient Medications:     alpha lipoic acid 600 mg Cap, Take by mouth., Disp: , Rfl:     atorvastatin (LIPITOR) 40 MG tablet, TAKE 1 TABLET(40 MG) BY MOUTH EVERY DAY, Disp: 90 tablet, Rfl: 1    b complex vitamins capsule, Take 1 capsule by mouth once daily., Disp: , Rfl:     blood sugar diagnostic Strp, To check BG 4 times daily, to use with insurance preferred meter, Disp: 200 each, Rfl: 6    enalapril (VASOTEC) 20 MG tablet, Take 1 tablet (20 mg total) by mouth once daily., Disp: 90 tablet, Rfl: 1    gabapentin (NEURONTIN) 300 MG capsule, Take 2 capsules (600 mg total) by mouth 2 (two) times daily., Disp: 360 capsule, Rfl: 3    insulin (LANTUS SOLOSTAR U-100 INSULIN) glargine 100 units/mL (3mL) SubQ pen, INJECT 35 UNITS INTO THE SKIN ONCE DAILY, Disp: 45 mL, Rfl: 4    insulin lispro 100 unit/mL pen, Inject 12 Units into the skin 3 (three) times daily before meals. Plus correction scale, max TDD 84 units, Disp: 3 Box, Rfl: 6    lancets Misc, by Misc.(Non-Drug; Combo Route) route. One Touch Ultra Mini Lancets test bid, Disp: , Rfl:     lancets Misc, To check BG 4 times daily, to use with insurance preferred meter, Disp: 200 each, Rfl: 3    liraglutide 0.6 mg/0.1 mL, 18 mg/3 mL, subq PNIJ (VICTOZA 3-BRITTANY) 0.6 mg/0.1 mL (18 mg/3 mL) PnIj pen, Inject 1.8 mg into the skin once daily., Disp: 9 mL, Rfl: 1    metFORMIN (GLUCOPHAGE-XR) 500 MG ER 24hr tablet, Take 2 tablets (1,000 mg total) by mouth 2 (two) times daily with meals., Disp: 360 tablet, Rfl: 3    omega-3 fatty acids-vitamin E (FISH OIL) 1,000 mg Cap, Take by mouth. 4000 mg daily, Disp: , Rfl:     pen needle, diabetic (BD ULTRA-FINE ORIG PEN NEEDLE) 29 gauge x 1/2" Ndle, USE FIVE TIMES DAILY AS DIRECTED, Disp: 400 each, Rfl: 3    UNABLE TO FIND, APPLY UP TO 3.2 GRAMS (2 PUMPS) TO PAINFUL AREAS UP TO FIVE TIMES DAILY., Disp: , Rfl:     UNABLE TO FIND, APPLY UP TO 3.2 GRAMS (2 PUMPS) TO PAINFUL AREAS UP TO FIVE TIMES DAILY., Disp: , Rfl:     " "blood-glucose meter kit, To check BG 4 times daily, to use with insurance preferred meter, Disp: 1 each, Rfl: 1    HYDROcodone-acetaminophen (NORCO) 7.5-325 mg per tablet, TK 1 T PO Q 6 H PRN P, Disp: , Rfl: 0     Review of patient's allergies indicates:  No Known Allergies     Objective:   /80   Pulse 88   Resp 18   Ht 6' 3" (1.905 m)   Wt (!) 136.3 kg (300 lb 6 oz)   BMI 37.54 kg/m²   BP Readings from Last 3 Encounters:   12/07/20 124/80   11/25/20 (!) 134/98   06/25/20 (!) 150/86     Wt Readings from Last 3 Encounters:   12/07/20 1337 (!) 136.3 kg (300 lb 6 oz)   11/25/20 0838 135.4 kg (298 lb 8.1 oz)   06/25/20 0729 (!) 141.2 kg (311 lb 2.9 oz)          Physical Exam  Vitals signs reviewed.   Constitutional:       General: He is not in acute distress.     Appearance: He is well-developed. He is obese. He is not ill-appearing, toxic-appearing or diaphoretic.   HENT:      Head: Normocephalic and atraumatic.      Nose: Nose normal.   Neck:      Thyroid: No thyromegaly.      Trachea: No tracheal deviation.      Comments:  No thyromegaly or palpable thyroid nodules    Cardiovascular:      Rate and Rhythm: Normal rate and regular rhythm.      Heart sounds: Normal heart sounds. No murmur.   Pulmonary:      Effort: Pulmonary effort is normal. No respiratory distress.      Breath sounds: Normal breath sounds. No wheezing or rales.   Abdominal:      General: Bowel sounds are normal. There is no distension.      Palpations: Abdomen is soft.      Tenderness: There is no abdominal tenderness.      Comments: Obese, No lipohypertrophy     Musculoskeletal:         General: No swelling.   Lymphadenopathy:      Cervical: No cervical adenopathy.   Skin:     General: Skin is warm.      Comments: Foot exam:  +2DP/PT pulses bilat ,no ulcers, abnl microfilament bilat, mycotic toe nails, L big toe paronychia L 2nd toe skin desquamation    Neurological:      Mental Status: He is alert and oriented to person, place, and time. "      Cranial Nerves: No cranial nerve deficit.   Psychiatric:         Thought Content: Thought content normal.           Lab Results   Component Value Date     11/24/2020    K 3.9 11/24/2020     11/24/2020    CO2 29 11/24/2020    BUN 17 11/24/2020    CREATININE 0.88 11/24/2020     (H) 11/24/2020    HGBA1C 10.9 (H) 11/24/2020    AST 37 (H) 11/24/2020    ALT 40 11/24/2020    ALBUMIN 4.0 11/24/2020    PROT 7.5 11/24/2020    BILITOT 0.8 11/24/2020    WBC 7.7 11/24/2020    HGB 16.8 11/24/2020    HCT 52.1 (H) 11/24/2020    MCV 86.1 11/24/2020    MCH 27.8 11/24/2020     11/24/2020    MPV 12.0 11/24/2020    GRAN 4.9 06/14/2017    GRAN 59.0 06/14/2017    LYMPH 2,418 11/24/2020    LYMPH 31.4 11/24/2020    CHOL 173 11/24/2020    HDL 33 (L) 11/24/2020    LDLCALC 108 (H) 11/24/2020    TRIG 196 (H) 11/24/2020       Lab Results   Component Value Date    TSH 1.46 11/24/2020    FREET4 1.30 06/06/2012        Thyroid Labs Latest Ref Rng & Units 5/11/2018 4/17/2019 11/24/2020   TSH 0.40 - 4.50 mIU/L - - 1.46   Free T4 0.71 - 1.51 ng/dl - - -   Sodium 135 - 146 mmol/L 142 140 141   Potassium 3.5 - 5.3 mmol/L 4.9 5.0 3.9   Chloride 98 - 110 mmol/L 107 104 101   Carbon Dioxide 20 - 32 mmol/L 30 30 29   Glucose 65 - 99 mg/dL 165(H) 220(H) 214(H)   Blood Urea Nitrogen 7 - 25 mg/dL 10 18 17   Creatinine 0.70 - 1.33 mg/dL 0.89 0.90 0.88   Calcium 8.6 - 10.3 mg/dL 9.4 9.6 9.1   Total Protein 6.1 - 8.1 g/dL 7.3 7.3 7.5   Albumin 3.6 - 5.1 g/dL 4.1 4.0 4.0   Total Bilirubin 0.2 - 1.2 mg/dL 0.7 0.8 0.8   AST 10 - 35 U/L 26 47(H) 37(H)   ALT 9 - 46 U/L 30 43 40   Anion Gap 8 - 16 mmol/L - - -   eGFR (African American) > OR = 60 mL/min/1.73m2 116 115 115   eGFR (Non-African American) > OR = 60 mL/min/1.73m2 100 99 99   WBC 3.8 - 10.8 Thousand/uL - - 7.7   RBC 4.20 - 5.80 Million/uL - - 6.05(H)   Hemoglobin 13.2 - 17.1 g/dL - - 16.8   Hematocrit 38.5 - 50.0 % - - 52.1(H)   MCV 80.0 - 100.0 fL - - 86.1   MCH 27.0 - 33.0 pg -  - 27.8   MCHC 32.0 - 36.0 g/dL - - 32.2   RDW 11.0 - 15.0 % - - 15.0   Platelets 140 - 400 Thousand/uL - - 180   MPV 7.5 - 12.5 fL - - 12.0   Gran # 1.8 - 7.7 K/uL - - -   Lymph # 850 - 3,900 cells/uL - - 2,418   Mono # 200 - 950 cells/uL - - 601   Eos # 15 - 500 cells/uL - - 77   Baso # 0 - 200 cells/uL - - 39   Gran % 38.0 - 73.0 % - - -   Lymph % % - - 31.4   Mono% % - - 7.8   Eos % % - - 1.0   Baso % % - - 0.5           Hemoglobin A1C   Date Value Ref Range Status   11/24/2020 10.9 (H) <5.7 % of total Hgb Final     Comment:     For someone without known diabetes, a hemoglobin A1c  value of 6.5% or greater indicates that they may have   diabetes and this should be confirmed with a follow-up   test.     For someone with known diabetes, a value <7% indicates   that their diabetes is well controlled and a value   greater than or equal to 7% indicates suboptimal   control. A1c targets should be individualized based on   duration of diabetes, age, comorbid conditions, and   other considerations.     Currently, no consensus exists regarding use of  hemoglobin A1c for diagnosis of diabetes for children.         04/16/2019 9.5 (H) <5.7 % of total Hgb Final     Comment:     For someone without known diabetes, a hemoglobin A1c  value of 6.5% or greater indicates that they may have   diabetes and this should be confirmed with a follow-up   test.     For someone with known diabetes, a value <7% indicates   that their diabetes is well controlled and a value   greater than or equal to 7% indicates suboptimal   control. A1c targets should be individualized based on   duration of diabetes, age, comorbid conditions, and   other considerations.     Currently, no consensus exists regarding use of  hemoglobin A1c for diagnosis of diabetes for children.         11/03/2017 10.7 (H) 4.0 - 5.6 % Final     Comment:     According to ADA guidelines, hemoglobin A1c <7.0% represents  optimal control in non-pregnant diabetic patients.  Different  metrics may apply to specific patient populations.   Standards of Medical Care in Diabetes-2016.  For the purpose of screening for the presence of diabetes:  <5.7%     Consistent with the absence of diabetes  5.7-6.4%  Consistent with increasing risk for diabetes   (prediabetes)  >or=6.5%  Consistent with diabetes  Currently, no consensus exists for use of hemoglobin A1c  for diagnosis of diabetes for children.  This Hemoglobin A1c assay has significant interference with fetal   hemoglobin   (HbF). The results are invalid for patients with abnormal amounts of   HbF,   including those with known Hereditary Persistence   of Fetal Hemoglobin. Heterozygous hemoglobin variants (HbAS, HbAC,   HbAD, HbAE, HbA2) do not significantly interfere with this assay;   however, presence of multiple variants in a sample may impact the %   interference.           Assessment and plan:     Problem List Items Addressed This Visit        Cardiac/Vascular    Essential hypertension    Current Assessment & Plan     BP controlled. Continue current meds. Pt to notify PCP if BP consistently more than 140/90.           Mixed hyperlipidemia    Current Assessment & Plan     , , not at goal.  On lipitor 40. Resume fish oil, 2 gm BID. Consider adding Zetia in the future vs increasing dose of lipitor. LFTs elevated. Has fatty liver. Monitor LFTs.            Endocrine    Type 2 diabetes mellitus with diabetic polyneuropathy, with long-term current use of insulin - Primary    Current Assessment & Plan     BG not at goal.  HbA1C above goal  Change Lantus to 35 units daily  Start Humalog 12 units  Before each meal ADD SSI 2:50 >150.  Continue Metformin. Increase to 1000 mg twice daily. egfr >60  Continue Victoza 1.8 daily.   Check blood sugar four times daily. Plan to apply for CGM at next visit.  Due for eye exam.  Urine microalbumin wnls. On Ace.  Discussed proper foot care.  F/u with podiatry  +ve neuropathy on exam  Counseled  "pt on low carb/low fat diet and to increase physical activity.  On statin           Relevant Medications    blood-glucose meter kit    metFORMIN (GLUCOPHAGE-XR) 500 MG ER 24hr tablet    insulin lispro 100 unit/mL pen    pen needle, diabetic (BD ULTRA-FINE ORIG PEN NEEDLE) 29 gauge x 1/2" Ndle    insulin (LANTUS SOLOSTAR U-100 INSULIN) glargine 100 units/mL (3mL) SubQ pen       GI    Fatty liver    Current Assessment & Plan     Encouraged healthy low fat low carb diet and increase physical activity. AST elevated and trending down.  Consider addition of actos.  F/u with PCP.              Other    Class 2 severe obesity due to excess calories with serious comorbidity and body mass index (BMI) of 37.0 to 37.9 in adult    Current Assessment & Plan     BMI 37. Encouraged healthy low fat low carb diet and increase physical activity                 RTC in 1 month virtual with me (then f/u with REILLY Palma in the future).  "

## 2020-12-07 NOTE — LETTER
December 7, 2020      Yoanna Butterfield, REILLY  [address] [facility] [address] [place] [address]           [facility]  [address] [place] [address]  Phone: [phone]  Fax: [phone]          Patient: Dre Short   MR Number: [id number]   YOB: 1970   Date of Visit: 12/7/2020       Dear Yoanna Butterfield:    Thank you for referring Dre Short to me for evaluation. Attached you will find relevant portions of my assessment and plan of care.    If you have questions, please do not hesitate to call me. I look forward to following Dre Short along with you.    Sincerely,    Juliette L. Sandifer Kum-Nji, MD    Enclosure  CC:  No Recipients    If you would like to receive this communication electronically, please contact [email or web address] or [phone] to request more information on [employer] access.    For providers and/or their staff who would like to refer a patient to [facility], please contact us through our one-stop-shop provider referral line, [facility], at [phone].    If you feel you have received this communication in error or would no longer like to receive these types of communications, please e-mail [email or web address]

## 2020-12-10 LAB
LEFT EYE DM RETINOPATHY: POSITIVE
RIGHT EYE DM RETINOPATHY: POSITIVE

## 2020-12-18 ENCOUNTER — TELEPHONE (OUTPATIENT)
Dept: GASTROENTEROLOGY | Facility: CLINIC | Age: 50
End: 2020-12-18

## 2020-12-18 DIAGNOSIS — Z01.812 PRE-PROCEDURE LAB EXAM: ICD-10-CM

## 2020-12-18 NOTE — TELEPHONE ENCOUNTER
Patient is scheduled for a colonoscopy on 2/2/21. Currently on Lantus and Victoza daily. Please notify patient of insulin adjustments, if needed prior to his scope.

## 2020-12-18 NOTE — TELEPHONE ENCOUNTER
Spoke with patient and scheduled a colonoscopy for 2/2/21 and a covid test for 1/30/21. Colonoscopy instructions sent to patient via [employer].

## 2020-12-18 NOTE — TELEPHONE ENCOUNTER
Spoke with Dat at patient's pharmacy, [facility] in [place]. Golytely prescription for patient ordered for his colonoscopy.

## 2020-12-21 ENCOUNTER — PATIENT OUTREACH (OUTPATIENT)
Dept: ADMINISTRATIVE | Facility: HOSPITAL | Age: 50
End: 2020-12-21

## 2020-12-21 NOTE — LETTER
AUTHORIZATION FOR RELEASE OF   CONFIDENTIAL INFORMATION    Bhaskar Chilel MD    We are seeing Dre Short, date of birth 1970, in the clinic at [facility]. Scott Peñaloza MD is the patient's PCP. Dre Short has an outstanding lab/procedure at the time we reviewed his chart. In order to help keep his health information updated, he has authorized us to request the following medical record(s):        EYE EXAM                Please fax records to [facility], Troy J Davis, MD,  [phone]     If you have any questions, please contact Melony Bolanos, Care Coordinator   at [phone].          Patient Name: Dre Short  : 1970  Patient Phone #: [phone]

## 2020-12-23 ENCOUNTER — PATIENT OUTREACH (OUTPATIENT)
Dept: ADMINISTRATIVE | Facility: HOSPITAL | Age: 50
End: 2020-12-23

## 2021-01-03 ENCOUNTER — PATIENT OUTREACH (OUTPATIENT)
Dept: ADMINISTRATIVE | Facility: OTHER | Age: 51
End: 2021-01-03

## 2021-01-19 ENCOUNTER — PATIENT MESSAGE (OUTPATIENT)
Dept: ENDOCRINOLOGY | Facility: CLINIC | Age: 51
End: 2021-01-19

## 2021-01-19 DIAGNOSIS — E11.42 TYPE 2 DIABETES MELLITUS WITH DIABETIC POLYNEUROPATHY, WITH LONG-TERM CURRENT USE OF INSULIN: ICD-10-CM

## 2021-01-19 DIAGNOSIS — E11.65 UNCONTROLLED TYPE 2 DIABETES MELLITUS WITH HYPERGLYCEMIA: ICD-10-CM

## 2021-01-19 DIAGNOSIS — Z79.4 TYPE 2 DIABETES MELLITUS WITH DIABETIC POLYNEUROPATHY, WITH LONG-TERM CURRENT USE OF INSULIN: ICD-10-CM

## 2021-01-19 RX ORDER — INSULIN GLARGINE 100 [IU]/ML
INJECTION, SOLUTION SUBCUTANEOUS
Qty: 31.5 ML | Refills: 3 | Status: SHIPPED | OUTPATIENT
Start: 2021-01-19

## 2021-01-19 RX ORDER — METFORMIN HYDROCHLORIDE 500 MG/1
1000 TABLET, EXTENDED RELEASE ORAL 2 TIMES DAILY WITH MEALS
Qty: 360 TABLET | Refills: 3 | Status: SHIPPED | OUTPATIENT
Start: 2021-01-19 | End: 2022-01-19

## 2021-01-19 RX ORDER — INSULIN LISPRO 100 [IU]/ML
INJECTION, SOLUTION INTRAVENOUS; SUBCUTANEOUS
Qty: 76 ML | Refills: 3 | Status: SHIPPED | OUTPATIENT
Start: 2021-01-19

## 2021-01-19 RX ORDER — LIRAGLUTIDE 6 MG/ML
1.8 INJECTION SUBCUTANEOUS DAILY
Qty: 9 ML | Refills: 11 | Status: SHIPPED | OUTPATIENT
Start: 2021-01-19 | End: 2021-02-18

## 2021-01-30 ENCOUNTER — LAB VISIT (OUTPATIENT)
Dept: FAMILY MEDICINE | Facility: CLINIC | Age: 51
End: 2021-01-30
Payer: COMMERCIAL

## 2021-01-30 DIAGNOSIS — Z01.812 PRE-PROCEDURE LAB EXAM: ICD-10-CM

## 2021-01-30 PROCEDURE — U0003 INFECTIOUS AGENT DETECTION BY NUCLEIC ACID (DNA OR RNA); SEVERE ACUTE RESPIRATORY SYNDROME CORONAVIRUS 2 (SARS-COV-2) (CORONAVIRUS DISEASE [COVID-19]), AMPLIFIED PROBE TECHNIQUE, MAKING USE OF HIGH THROUGHPUT TECHNOLOGIES AS DESCRIBED BY CMS-2020-01-R: HCPCS

## 2021-01-31 LAB — SARS-COV-2 RNA RESP QL NAA+PROBE: NOT DETECTED

## 2021-02-02 ENCOUNTER — HOSPITAL ENCOUNTER (OUTPATIENT)
Facility: HOSPITAL | Age: 51
Discharge: HOME | End: 2021-02-02
Attending: INTERNAL MEDICINE | Admitting: INTERNAL MEDICINE
Payer: COMMERCIAL

## 2021-02-02 ENCOUNTER — ANESTHESIA (OUTPATIENT)
Dept: ENDOSCOPY | Facility: HOSPITAL | Age: 51
End: 2021-02-02
Payer: COMMERCIAL

## 2021-02-02 ENCOUNTER — ANESTHESIA EVENT (OUTPATIENT)
Dept: ENDOSCOPY | Facility: HOSPITAL | Age: 51
End: 2021-02-02
Payer: COMMERCIAL

## 2021-02-02 DIAGNOSIS — Z86.010 HX OF COLONIC POLYPS: ICD-10-CM

## 2021-02-02 LAB — GLUCOSE SERPL-MCNC: 171 MG/DL (ref 70–110)

## 2021-02-02 PROCEDURE — D9220A PRA ANESTHESIA: Mod: CRNA,,, | Performed by: NURSE ANESTHETIST, CERTIFIED REGISTERED

## 2021-02-02 PROCEDURE — G0105 COLORECTAL SCRN; HI RISK IND: HCPCS | Mod: PO | Performed by: INTERNAL MEDICINE

## 2021-02-02 PROCEDURE — G0105 COLORECTAL SCRN; HI RISK IND: ICD-10-PCS | Mod: ,,, | Performed by: INTERNAL MEDICINE

## 2021-02-02 PROCEDURE — 37000008 HC ANESTHESIA 1ST 15 MINUTES: Mod: PO | Performed by: INTERNAL MEDICINE

## 2021-02-02 PROCEDURE — 82962 GLUCOSE BLOOD TEST: CPT | Mod: PO | Performed by: INTERNAL MEDICINE

## 2021-02-02 PROCEDURE — D9220A PRA ANESTHESIA: ICD-10-PCS | Mod: ANES,,, | Performed by: ANESTHESIOLOGY

## 2021-02-02 PROCEDURE — D9220A PRA ANESTHESIA: ICD-10-PCS | Mod: CRNA,,, | Performed by: NURSE ANESTHETIST, CERTIFIED REGISTERED

## 2021-02-02 PROCEDURE — 63600175 PHARM REV CODE 636 W HCPCS: Mod: PO | Performed by: NURSE ANESTHETIST, CERTIFIED REGISTERED

## 2021-02-02 PROCEDURE — 63600175 PHARM REV CODE 636 W HCPCS: Mod: PO | Performed by: INTERNAL MEDICINE

## 2021-02-02 PROCEDURE — G0105 COLORECTAL SCRN; HI RISK IND: HCPCS | Mod: ,,, | Performed by: INTERNAL MEDICINE

## 2021-02-02 PROCEDURE — D9220A PRA ANESTHESIA: Mod: ANES,,, | Performed by: ANESTHESIOLOGY

## 2021-02-02 PROCEDURE — 37000009 HC ANESTHESIA EA ADD 15 MINS: Mod: PO | Performed by: INTERNAL MEDICINE

## 2021-02-02 PROCEDURE — 25000003 PHARM REV CODE 250: Mod: PO | Performed by: NURSE ANESTHETIST, CERTIFIED REGISTERED

## 2021-02-02 RX ORDER — SODIUM CHLORIDE, SODIUM LACTATE, POTASSIUM CHLORIDE, CALCIUM CHLORIDE 600; 310; 30; 20 MG/100ML; MG/100ML; MG/100ML; MG/100ML
INJECTION, SOLUTION INTRAVENOUS CONTINUOUS
Status: DISCONTINUED | OUTPATIENT
Start: 2021-02-02 | End: 2021-02-02 | Stop reason: HOSPADM

## 2021-02-02 RX ORDER — PROPOFOL 10 MG/ML
VIAL (ML) INTRAVENOUS
Status: DISCONTINUED | OUTPATIENT
Start: 2021-02-02 | End: 2021-02-02

## 2021-02-02 RX ORDER — SODIUM CHLORIDE 0.9 % (FLUSH) 0.9 %
10 SYRINGE (ML) INJECTION
Status: DISCONTINUED | OUTPATIENT
Start: 2021-02-02 | End: 2021-02-02 | Stop reason: HOSPADM

## 2021-02-02 RX ORDER — LIDOCAINE HCL/PF 100 MG/5ML
SYRINGE (ML) INTRAVENOUS
Status: DISCONTINUED | OUTPATIENT
Start: 2021-02-02 | End: 2021-02-02

## 2021-02-02 RX ADMIN — PROPOFOL 30 MG: 10 INJECTION, EMULSION INTRAVENOUS at 07:02

## 2021-02-02 RX ADMIN — LIDOCAINE HYDROCHLORIDE 100 MG: 20 INJECTION, SOLUTION INTRAVENOUS at 07:02

## 2021-02-02 RX ADMIN — SODIUM CHLORIDE, SODIUM LACTATE, POTASSIUM CHLORIDE, AND CALCIUM CHLORIDE: .6; .31; .03; .02 INJECTION, SOLUTION INTRAVENOUS at 07:02

## 2021-02-04 VITALS
DIASTOLIC BLOOD PRESSURE: 73 MMHG | RESPIRATION RATE: 18 BRPM | SYSTOLIC BLOOD PRESSURE: 132 MMHG | BODY MASS INDEX: 36.68 KG/M2 | HEART RATE: 71 BPM | TEMPERATURE: 98 F | WEIGHT: 295 LBS | HEIGHT: 75 IN | OXYGEN SATURATION: 96 %

## 2021-04-29 ENCOUNTER — PATIENT MESSAGE (OUTPATIENT)
Dept: RESEARCH | Facility: HOSPITAL | Age: 51
End: 2021-04-29

## 2021-09-26 ENCOUNTER — PATIENT MESSAGE (OUTPATIENT)
Dept: ADMINISTRATIVE | Facility: HOSPITAL | Age: 51
End: 2021-09-26

## 2021-09-26 ENCOUNTER — TELEPHONE (OUTPATIENT)
Dept: ADMINISTRATIVE | Facility: HOSPITAL | Age: 51
End: 2021-09-26

## 2022-02-07 ENCOUNTER — HOSPITAL ENCOUNTER (OUTPATIENT)
Dept: RADIOLOGY | Facility: HOSPITAL | Age: 52
Discharge: HOME | End: 2022-02-07
Attending: PODIATRIST
Payer: COMMERCIAL

## 2022-02-07 ENCOUNTER — OFFICE VISIT (OUTPATIENT)
Dept: PODIATRY | Facility: CLINIC | Age: 52
End: 2022-02-07
Payer: COMMERCIAL

## 2022-02-07 DIAGNOSIS — E11.49 TYPE II DIABETES MELLITUS WITH NEUROLOGICAL MANIFESTATIONS: ICD-10-CM

## 2022-02-07 DIAGNOSIS — L97.512 CHRONIC ULCER OF RIGHT GREAT TOE WITH FAT LAYER EXPOSED: ICD-10-CM

## 2022-02-07 DIAGNOSIS — L97.512 CHRONIC ULCER OF RIGHT GREAT TOE WITH FAT LAYER EXPOSED: Primary | ICD-10-CM

## 2022-02-07 PROCEDURE — 1160F PR REVIEW ALL MEDS BY PRESCRIBER/CLIN PHARMACIST DOCUMENTED: ICD-10-PCS | Mod: CPTII,S$GLB,, | Performed by: PODIATRIST

## 2022-02-07 PROCEDURE — 99999 PR PBB SHADOW E&M-EST. PATIENT-LVL III: CPT | Mod: PBBFAC,,, | Performed by: PODIATRIST

## 2022-02-07 PROCEDURE — 1159F MED LIST DOCD IN RCRD: CPT | Mod: CPTII,S$GLB,, | Performed by: PODIATRIST

## 2022-02-07 PROCEDURE — 11042 WOUND DEBRIDEMENT: ICD-10-PCS | Mod: T5,S$GLB,, | Performed by: PODIATRIST

## 2022-02-07 PROCEDURE — 1159F PR MEDICATION LIST DOCUMENTED IN MEDICAL RECORD: ICD-10-PCS | Mod: CPTII,S$GLB,, | Performed by: PODIATRIST

## 2022-02-07 PROCEDURE — 87186 SC STD MICRODIL/AGAR DIL: CPT | Performed by: PODIATRIST

## 2022-02-07 PROCEDURE — 73630 X-RAY EXAM OF FOOT: CPT | Mod: 26,RT,, | Performed by: RADIOLOGY

## 2022-02-07 PROCEDURE — 99999 PR PBB SHADOW E&M-EST. PATIENT-LVL III: ICD-10-PCS | Mod: PBBFAC,,, | Performed by: PODIATRIST

## 2022-02-07 PROCEDURE — 99214 PR OFFICE/OUTPT VISIT, EST, LEVL IV, 30-39 MIN: ICD-10-PCS | Mod: 25,S$GLB,, | Performed by: PODIATRIST

## 2022-02-07 PROCEDURE — 73630 X-RAY EXAM OF FOOT: CPT | Mod: TC,PO,RT

## 2022-02-07 PROCEDURE — 87075 CULTR BACTERIA EXCEPT BLOOD: CPT | Performed by: PODIATRIST

## 2022-02-07 PROCEDURE — 11042 DBRDMT SUBQ TIS 1ST 20SQCM/<: CPT | Mod: T5,S$GLB,, | Performed by: PODIATRIST

## 2022-02-07 PROCEDURE — 73630 XR FOOT COMPLETE 3 VIEW RIGHT: ICD-10-PCS | Mod: 26,RT,, | Performed by: RADIOLOGY

## 2022-02-07 PROCEDURE — 99214 OFFICE O/P EST MOD 30 MIN: CPT | Mod: 25,S$GLB,, | Performed by: PODIATRIST

## 2022-02-07 PROCEDURE — 87077 CULTURE AEROBIC IDENTIFY: CPT | Performed by: PODIATRIST

## 2022-02-07 PROCEDURE — 1160F RVW MEDS BY RX/DR IN RCRD: CPT | Mod: CPTII,S$GLB,, | Performed by: PODIATRIST

## 2022-02-07 PROCEDURE — 87070 CULTURE OTHR SPECIMN AEROBIC: CPT | Performed by: PODIATRIST

## 2022-02-07 RX ORDER — GABAPENTIN 300 MG/1
600 CAPSULE ORAL 2 TIMES DAILY
Qty: 120 CAPSULE | Refills: 2 | Status: SHIPPED | OUTPATIENT
Start: 2022-02-07 | End: 2022-04-27 | Stop reason: SDUPTHER

## 2022-02-07 RX ORDER — AMOXICILLIN AND CLAVULANATE POTASSIUM 875; 125 MG/1; MG/1
1 TABLET, FILM COATED ORAL 2 TIMES DAILY
Qty: 14 TABLET | Refills: 0 | Status: SHIPPED | OUTPATIENT
Start: 2022-02-07 | End: 2022-02-14

## 2022-02-07 NOTE — PROCEDURES
"Wound Debridement    Date/Time: 2/7/2022 7:30 AM  Performed by: Alfredo Shirley DPM  Authorized by: Alfredo Shirley DPM     Time out: Immediately prior to procedure a "time out" was called to verify the correct patient, procedure, equipment, support staff and site/side marked as required.    Consent Done?:  Yes (Verbal)    Preparation: Patient was prepped and draped in usual sterile fashion      Wound Details:    Location:  Right foot    Location:  Right 1st Toe    Type of Debridement:  Excisional       Length (cm):  1.3       Area (sq cm):  1.56       Width (cm):  1.2       Percent Debrided (%):  100       Depth (cm):  0.3       Total Area Debrided (sq cm):  1.56    Depth of debridement:  Subcutaneous tissue    Devitalized tissue debrided:  Biofilm and Slough    Instruments:  Curette    Bleeding:  Minimal  Hemostasis Achieved: Yes    Method Used:  Pressure  Patient tolerance:  Patient tolerated the procedure well with no immediate complications      "

## 2022-02-07 NOTE — PROGRESS NOTES
Subjective:      Patient ID: Dre Short is a 51 y.o. male.    Chief Complaint: Diabetic Foot Ulcer (R grt toe, FOOT EXAM DUE )    Dre is a 51 y.o. male who presents to the clinic for annual evaluation and treatment of diabetic feet with concern over a new ulcer to the right great toe. Dre has a past medical history of Colon polyps (7/3/2012), Combined hyperlipidemia associated with type 2 diabetes mellitus, Diabetes mellitus type II, uncontrolled, Diverticulitis of colon (7/3/2012), Fatty liver, Hyperlipidemia, Hypertension associated with diabetes, and Sleep apnea. The ulcer to the right toe started as a callus and he noticed the callus for a few months until about 2 weeks ago it began to bleed and opened up into the sore he has now.       PCP: Scott Peñaloza MD    Date Last Seen by PCP: 7/6/2021    Current shoe gear: Casual shoes    Hemoglobin A1C   Date Value Ref Range Status   11/24/2020 10.9 (H) <5.7 % of total Hgb Final     Comment:     For someone without known diabetes, a hemoglobin A1c  value of 6.5% or greater indicates that they may have   diabetes and this should be confirmed with a follow-up   test.     For someone with known diabetes, a value <7% indicates   that their diabetes is well controlled and a value   greater than or equal to 7% indicates suboptimal   control. A1c targets should be individualized based on   duration of diabetes, age, comorbid conditions, and   other considerations.     Currently, no consensus exists regarding use of  hemoglobin A1c for diagnosis of diabetes for children.         04/16/2019 9.5 (H) <5.7 % of total Hgb Final     Comment:     For someone without known diabetes, a hemoglobin A1c  value of 6.5% or greater indicates that they may have   diabetes and this should be confirmed with a follow-up   test.     For someone with known diabetes, a value <7% indicates   that their diabetes is well controlled and a value   greater than or equal to 7% indicates suboptimal    control. A1c targets should be individualized based on   duration of diabetes, age, comorbid conditions, and   other considerations.     Currently, no consensus exists regarding use of  hemoglobin A1c for diagnosis of diabetes for children.         11/03/2017 10.7 (H) 4.0 - 5.6 % Final     Comment:     According to ADA guidelines, hemoglobin A1c <7.0% represents  optimal control in non-pregnant diabetic patients. Different  metrics may apply to specific patient populations.   Standards of Medical Care in Diabetes-2016.  For the purpose of screening for the presence of diabetes:  <5.7%     Consistent with the absence of diabetes  5.7-6.4%  Consistent with increasing risk for diabetes   (prediabetes)  >or=6.5%  Consistent with diabetes  Currently, no consensus exists for use of hemoglobin A1c  for diagnosis of diabetes for children.  This Hemoglobin A1c assay has significant interference with fetal   hemoglobin   (HbF). The results are invalid for patients with abnormal amounts of   HbF,   including those with known Hereditary Persistence   of Fetal Hemoglobin. Heterozygous hemoglobin variants (HbAS, HbAC,   HbAD, HbAE, HbA2) do not significantly interfere with this assay;   however, presence of multiple variants in a sample may impact the %   interference.             Review of Systems   Constitutional: Negative for chills and fever.   Cardiovascular: Positive for leg swelling. Negative for claudication.   Respiratory: Negative for shortness of breath.    Skin: Positive for nail changes. Negative for itching and rash.   Musculoskeletal: Positive for arthritis. Negative for muscle cramps, muscle weakness and myalgias.   Gastrointestinal: Negative for nausea and vomiting.   Neurological: Positive for numbness and paresthesias. Negative for focal weakness and loss of balance.           Objective:      Physical Exam  Constitutional:       General: He is not in acute distress.     Appearance: He is well-developed. He is  not diaphoretic.   Cardiovascular:      Pulses:           Dorsalis pedis pulses are 2+ on the right side and 2+ on the left side.        Posterior tibial pulses are 2+ on the right side and 2+ on the left side.      Comments: < 3 sec capillary refill time to toes 1-5 bilateral. Feet are warm to touch proximally with distal cooling b/l. There is no hair growth on the feet and toes b/l. There is moderate edema b/l. No spider veins or varicosities present b/l.     Musculoskeletal:      Comments: Resolved discomfort to palpation at the plantar metatarsal heads area.    Equinus noted b/l ankles with < 10 deg DF noted. MMT 5/5 in DF/PF/Inv/Ev resistance with no reproduction of pain in any direction. Passive range of motion of ankle and pedal joints is painless b/l.     Skin:     General: Skin is warm and dry.      Coloration: Skin is not pale.      Findings: Lesion present. No abrasion, bruising, burn, ecchymosis, erythema, laceration, petechiae or rash.      Nails: There is no clubbing.      Comments: Skin temperature, texture and turgor within normal limits.'    Ulcer Location: Plantar right great toe  Measurements: Pre: 1.0x1.0x0.2 post 1.3x1.2x0.3 cm  Periwound: Intact  Drainage: serosanguinous.  Pus: None.  Malodor: None.  Base:  100% granular. 0% fibrin.  Signs of infection: Mild erythema and warmth       Neurological:      Mental Status: He is alert and oriented to person, place, and time.      Sensory: Sensory deficit present.      Motor: No tremor, atrophy or abnormal muscle tone.      Comments: Negative tinel sign bilateral. Diminished vibratory and protective sensation bilateral.   Psychiatric:         Behavior: Behavior normal.               Assessment:       Encounter Diagnoses   Name Primary?    Type II diabetes mellitus with neurological manifestations     Chronic ulcer of right great toe with fat layer exposed Yes         Plan:       Dre was seen today for diabetic foot ulcer.    Diagnoses and all  orders for this visit:    Chronic ulcer of right great toe with fat layer exposed  -     amoxicillin-clavulanate 875-125mg (AUGMENTIN) 875-125 mg per tablet; Take 1 tablet by mouth 2 (two) times daily. for 7 days  -     Aerobic culture  -     Culture, Anaerobic  -     X-Ray Foot Complete Right; Future  -     Wound Debridement    Type II diabetes mellitus with neurological manifestations  -     gabapentin (NEURONTIN) 300 MG capsule; Take 2 capsules (600 mg total) by mouth 2 (two) times daily.      I counseled the patient on his conditions, their implications and medical management.    Shoe inspection. Diabetic Foot Education. Patient reminded of the importance of good nutrition and blood sugar control to help prevent podiatric complications of diabetes. Patient instructed on proper foot hygeine. We discussed wearing proper shoe gear, daily foot inspections, never walking without protective shoe gear, never putting sharp instruments to feet    Discussed in depth wound care treatment, started on antibiotics and will follow cultures    X-ray today to rule out signs of deeper infection    Wound debrided see attached note and dressed with iodosorb football and darco to offload    Adequate vitamin supplementation, protein intake, and hydration - discussed with patient.    Return for weekly wound care    Alfredo Shirley DPM

## 2022-02-11 LAB
BACTERIA SPEC AEROBE CULT: ABNORMAL
BACTERIA SPEC AEROBE CULT: ABNORMAL

## 2022-02-14 ENCOUNTER — OFFICE VISIT (OUTPATIENT)
Dept: PODIATRY | Facility: CLINIC | Age: 52
End: 2022-02-14
Payer: COMMERCIAL

## 2022-02-14 ENCOUNTER — TELEPHONE (OUTPATIENT)
Dept: PODIATRY | Facility: CLINIC | Age: 52
End: 2022-02-14

## 2022-02-14 DIAGNOSIS — E11.49 TYPE II DIABETES MELLITUS WITH NEUROLOGICAL MANIFESTATIONS: Primary | ICD-10-CM

## 2022-02-14 DIAGNOSIS — L97.512 CHRONIC ULCER OF RIGHT GREAT TOE WITH FAT LAYER EXPOSED: ICD-10-CM

## 2022-02-14 LAB — BACTERIA SPEC ANAEROBE CULT: NORMAL

## 2022-02-14 PROCEDURE — 1159F PR MEDICATION LIST DOCUMENTED IN MEDICAL RECORD: ICD-10-PCS | Mod: CPTII,S$GLB,, | Performed by: PODIATRIST

## 2022-02-14 PROCEDURE — 99213 PR OFFICE/OUTPT VISIT, EST, LEVL III, 20-29 MIN: ICD-10-PCS | Mod: 25,S$GLB,, | Performed by: PODIATRIST

## 2022-02-14 PROCEDURE — 11042 DBRDMT SUBQ TIS 1ST 20SQCM/<: CPT | Mod: S$GLB,,, | Performed by: PODIATRIST

## 2022-02-14 PROCEDURE — 99999 PR PBB SHADOW E&M-EST. PATIENT-LVL III: ICD-10-PCS | Mod: PBBFAC,,, | Performed by: PODIATRIST

## 2022-02-14 PROCEDURE — 99213 OFFICE O/P EST LOW 20 MIN: CPT | Mod: 25,S$GLB,, | Performed by: PODIATRIST

## 2022-02-14 PROCEDURE — 99999 PR PBB SHADOW E&M-EST. PATIENT-LVL III: CPT | Mod: PBBFAC,,, | Performed by: PODIATRIST

## 2022-02-14 PROCEDURE — 11042 WOUND DEBRIDEMENT: ICD-10-PCS | Mod: S$GLB,,, | Performed by: PODIATRIST

## 2022-02-14 PROCEDURE — 1159F MED LIST DOCD IN RCRD: CPT | Mod: CPTII,S$GLB,, | Performed by: PODIATRIST

## 2022-02-14 RX ORDER — SULFAMETHOXAZOLE AND TRIMETHOPRIM 800; 160 MG/1; MG/1
1 TABLET ORAL 2 TIMES DAILY
Qty: 10 TABLET | Refills: 0 | Status: SHIPPED | OUTPATIENT
Start: 2022-02-14 | End: 2022-02-19

## 2022-02-14 NOTE — TELEPHONE ENCOUNTER
----- Message from Ngozi Verde sent at 2/14/2022  4:13 PM CST -----  Pt was seen today and the script should have been sent to     [facility] - [place] [place] - [id number] [facility]  [place] [address]  Phone: [phone] Fax: [phone]    Please resend and advise  [phone] (home)

## 2022-02-14 NOTE — PROGRESS NOTES
Subjective:      Patient ID: Dre Short is a 51 y.o. male.    Chief Complaint: Diabetic Foot Ulcer (Right great toe)    Dre is a 51 y.o. male who presents to the clinic for annual evaluation and treatment of diabetic feet with concern over a new ulcer to the right great toe. Dre has a past medical history of Colon polyps (7/3/2012), Combined hyperlipidemia associated with type 2 diabetes mellitus, Diabetes mellitus type II, uncontrolled, Diverticulitis of colon (7/3/2012), Fatty liver, Hyperlipidemia, Hypertension associated with diabetes, and Sleep apnea. The ulcer to the right toe started as a callus and he noticed the callus for a few months until about 2 weeks ago it began to bleed and opened up into the sore he has now.     2/14/22: Patient returns for follow up right great toe ulcer, ambulating in darco and football no new complaints.       PCP: Scott Peñaloza MD    Date Last Seen by PCP: 7/6/2021    Current shoe gear: Casual shoes    Hemoglobin A1C   Date Value Ref Range Status   11/24/2020 10.9 (H) <5.7 % of total Hgb Final     Comment:     For someone without known diabetes, a hemoglobin A1c  value of 6.5% or greater indicates that they may have   diabetes and this should be confirmed with a follow-up   test.     For someone with known diabetes, a value <7% indicates   that their diabetes is well controlled and a value   greater than or equal to 7% indicates suboptimal   control. A1c targets should be individualized based on   duration of diabetes, age, comorbid conditions, and   other considerations.     Currently, no consensus exists regarding use of  hemoglobin A1c for diagnosis of diabetes for children.         04/16/2019 9.5 (H) <5.7 % of total Hgb Final     Comment:     For someone without known diabetes, a hemoglobin A1c  value of 6.5% or greater indicates that they may have   diabetes and this should be confirmed with a follow-up   test.     For someone with known diabetes, a value <7%  indicates   that their diabetes is well controlled and a value   greater than or equal to 7% indicates suboptimal   control. A1c targets should be individualized based on   duration of diabetes, age, comorbid conditions, and   other considerations.     Currently, no consensus exists regarding use of  hemoglobin A1c for diagnosis of diabetes for children.         11/03/2017 10.7 (H) 4.0 - 5.6 % Final     Comment:     According to ADA guidelines, hemoglobin A1c <7.0% represents  optimal control in non-pregnant diabetic patients. Different  metrics may apply to specific patient populations.   Standards of Medical Care in Diabetes-2016.  For the purpose of screening for the presence of diabetes:  <5.7%     Consistent with the absence of diabetes  5.7-6.4%  Consistent with increasing risk for diabetes   (prediabetes)  >or=6.5%  Consistent with diabetes  Currently, no consensus exists for use of hemoglobin A1c  for diagnosis of diabetes for children.  This Hemoglobin A1c assay has significant interference with fetal   hemoglobin   (HbF). The results are invalid for patients with abnormal amounts of   HbF,   including those with known Hereditary Persistence   of Fetal Hemoglobin. Heterozygous hemoglobin variants (HbAS, HbAC,   HbAD, HbAE, HbA2) do not significantly interfere with this assay;   however, presence of multiple variants in a sample may impact the %   interference.             Review of Systems   Constitutional: Negative for chills and fever.   Cardiovascular: Positive for leg swelling. Negative for claudication.   Respiratory: Negative for shortness of breath.    Skin: Positive for nail changes. Negative for itching and rash.   Musculoskeletal: Positive for arthritis. Negative for muscle cramps, muscle weakness and myalgias.   Gastrointestinal: Negative for nausea and vomiting.   Neurological: Positive for numbness and paresthesias. Negative for focal weakness and loss of balance.           Objective:       Physical Exam  Constitutional:       General: He is not in acute distress.     Appearance: He is well-developed. He is not diaphoretic.   Cardiovascular:      Pulses:           Dorsalis pedis pulses are 2+ on the right side and 2+ on the left side.        Posterior tibial pulses are 2+ on the right side and 2+ on the left side.      Comments: < 3 sec capillary refill time to toes 1-5 bilateral. Feet are warm to touch proximally with distal cooling b/l. There is no hair growth on the feet and toes b/l. There is moderate edema b/l. No spider veins or varicosities present b/l.     Musculoskeletal:      Comments: Resolved discomfort to palpation at the plantar metatarsal heads area.    Equinus noted b/l ankles with < 10 deg DF noted. MMT 5/5 in DF/PF/Inv/Ev resistance with no reproduction of pain in any direction. Passive range of motion of ankle and pedal joints is painless b/l.     Skin:     General: Skin is warm and dry.      Coloration: Skin is not pale.      Findings: Lesion present. No abrasion, bruising, burn, ecchymosis, erythema, laceration, petechiae or rash.      Nails: There is no clubbing.      Comments: Skin temperature, texture and turgor within normal limits.'    Ulcer Location: Plantar right great toe  Measurements: Pre: 1.0x0.8x0.2 post 1.1x0.9x0.3 cm  Periwound: Intact  Drainage: serosanguinous.  Pus: None.  Malodor: None.  Base:  100% granular. 0% fibrin.  Signs of infection: Mild erythema and warmth       Neurological:      Mental Status: He is alert and oriented to person, place, and time.      Sensory: Sensory deficit present.      Motor: No tremor, atrophy or abnormal muscle tone.      Comments: Negative tinel sign bilateral. Diminished vibratory and protective sensation bilateral.   Psychiatric:         Behavior: Behavior normal.               Assessment:       Encounter Diagnoses   Name Primary?    Type II diabetes mellitus with neurological manifestations Yes    Chronic ulcer of right  great toe with fat layer exposed     Healed ulcer of left foot on examination          Plan:       Dre was seen today for diabetic foot ulcer.    Diagnoses and all orders for this visit:    Type II diabetes mellitus with neurological manifestations  -     sulfamethoxazole-trimethoprim 800-160mg (BACTRIM DS) 800-160 mg Tab; Take 1 tablet by mouth 2 (two) times daily. for 5 days    Chronic ulcer of right great toe with fat layer exposed  -     sulfamethoxazole-trimethoprim 800-160mg (BACTRIM DS) 800-160 mg Tab; Take 1 tablet by mouth 2 (two) times daily. for 5 days    Healed ulcer of left foot on examination      I counseled the patient on his conditions, their implications and medical management.    Shoe inspection. Diabetic Foot Education. Patient reminded of the importance of good nutrition and blood sugar control to help prevent podiatric complications of diabetes. Patient instructed on proper foot hygeine. We discussed wearing proper shoe gear, daily foot inspections, never walking without protective shoe gear, never putting sharp instruments to feet    Discussed in depth wound care treatment, reviewed cultures noting that the Staph was MRSA resistant to the augmentin he was on, started him on Bactrim today    Wound debrided see attached note and dressed with iodosorb football and darco to offload    Adequate vitamin supplementation, protein intake, and hydration - discussed with patient.    Return for weekly wound care    Alfredo Shirley DPM

## 2022-02-14 NOTE — TELEPHONE ENCOUNTER
----- Message from Ngozi Verde sent at 2/14/2022  4:13 PM CST -----  Pt was seen today and the script should have been sent to     [facility] - [place] - [id number] [facility]  [place] [address]  Phone: [phone] Fax: [phone]    Please resend and advise  [phone] (home)

## 2022-02-14 NOTE — PROCEDURES
"Wound Debridement    Date/Time: 2/14/2022 2:15 PM  Performed by: Alfredo Shirley DPM  Authorized by: Alfredo Shirley DPM     Time out: Immediately prior to procedure a "time out" was called to verify the correct patient, procedure, equipment, support staff and site/side marked as required.    Consent Done?:  Yes (Verbal)  Local anesthesia used?: No      Wound Details:    Location:  Right foot    Location:  Right 1st Toe    Type of Debridement:  Excisional       Length (cm):  1.1       Area (sq cm):  0.99       Width (cm):  0.9       Percent Debrided (%):  100       Depth (cm):  0.3       Total Area Debrided (sq cm):  0.99    Depth of debridement:  Subcutaneous tissue    Devitalized tissue debrided:  Biofilm and Slough    Instruments:  Curette and Blade    Bleeding:  Minimal  Hemostasis Achieved: Yes    Method Used:  Pressure and Silver Nitrate  Patient tolerance:  Patient tolerated the procedure well with no immediate complications      "

## 2022-02-22 ENCOUNTER — OFFICE VISIT (OUTPATIENT)
Dept: PODIATRY | Facility: CLINIC | Age: 52
End: 2022-02-22
Payer: COMMERCIAL

## 2022-02-22 DIAGNOSIS — L97.512 CHRONIC ULCER OF RIGHT GREAT TOE WITH FAT LAYER EXPOSED: ICD-10-CM

## 2022-02-22 DIAGNOSIS — E11.49 TYPE II DIABETES MELLITUS WITH NEUROLOGICAL MANIFESTATIONS: Primary | ICD-10-CM

## 2022-02-22 PROCEDURE — 99999 PR PBB SHADOW E&M-EST. PATIENT-LVL III: ICD-10-PCS | Mod: PBBFAC,,, | Performed by: PODIATRIST

## 2022-02-22 PROCEDURE — 99499 UNLISTED E&M SERVICE: CPT | Mod: S$GLB,,, | Performed by: PODIATRIST

## 2022-02-22 PROCEDURE — 11042 DBRDMT SUBQ TIS 1ST 20SQCM/<: CPT | Mod: T5,S$GLB,, | Performed by: PODIATRIST

## 2022-02-22 PROCEDURE — 99499 NO LOS: ICD-10-PCS | Mod: S$GLB,,, | Performed by: PODIATRIST

## 2022-02-22 PROCEDURE — 11042 WOUND DEBRIDEMENT: ICD-10-PCS | Mod: T5,S$GLB,, | Performed by: PODIATRIST

## 2022-02-22 PROCEDURE — 1160F RVW MEDS BY RX/DR IN RCRD: CPT | Mod: CPTII,S$GLB,, | Performed by: PODIATRIST

## 2022-02-22 PROCEDURE — 1159F PR MEDICATION LIST DOCUMENTED IN MEDICAL RECORD: ICD-10-PCS | Mod: CPTII,S$GLB,, | Performed by: PODIATRIST

## 2022-02-22 PROCEDURE — 1160F PR REVIEW ALL MEDS BY PRESCRIBER/CLIN PHARMACIST DOCUMENTED: ICD-10-PCS | Mod: CPTII,S$GLB,, | Performed by: PODIATRIST

## 2022-02-22 PROCEDURE — 99999 PR PBB SHADOW E&M-EST. PATIENT-LVL III: CPT | Mod: PBBFAC,,, | Performed by: PODIATRIST

## 2022-02-22 PROCEDURE — 1159F MED LIST DOCD IN RCRD: CPT | Mod: CPTII,S$GLB,, | Performed by: PODIATRIST

## 2022-02-22 NOTE — PROGRESS NOTES
Subjective:      Patient ID: Dre Short is a 51 y.o. male.    Chief Complaint: Diabetic Foot Ulcer (Right great toe )    Dre is a 51 y.o. male who presents to the clinic for annual evaluation and treatment of diabetic feet with concern over a new ulcer to the right great toe. Dre has a past medical history of Colon polyps (7/3/2012), Combined hyperlipidemia associated with type 2 diabetes mellitus, Diabetes mellitus type II, uncontrolled, Diverticulitis of colon (7/3/2012), Fatty liver, Hyperlipidemia, Hypertension associated with diabetes, and Sleep apnea. The ulcer to the right toe started as a callus and he noticed the callus for a few months until about 2 weeks ago it began to bleed and opened up into the sore he has now.     2/14/22: Patient returns for follow up right great toe ulcer, ambulating in darco and football no new complaints.     2/22/22: Patient returns for follow up right great toe ulcer, ambulating in darco and football, taking the bactrim as prescribed and no new complaints.    PCP: Scott Peñaloza MD    Date Last Seen by PCP: 7/6/2021    Current shoe gear: Casual shoes    Hemoglobin A1C   Date Value Ref Range Status   11/24/2020 10.9 (H) <5.7 % of total Hgb Final     Comment:     For someone without known diabetes, a hemoglobin A1c  value of 6.5% or greater indicates that they may have   diabetes and this should be confirmed with a follow-up   test.     For someone with known diabetes, a value <7% indicates   that their diabetes is well controlled and a value   greater than or equal to 7% indicates suboptimal   control. A1c targets should be individualized based on   duration of diabetes, age, comorbid conditions, and   other considerations.     Currently, no consensus exists regarding use of  hemoglobin A1c for diagnosis of diabetes for children.         04/16/2019 9.5 (H) <5.7 % of total Hgb Final     Comment:     For someone without known diabetes, a hemoglobin A1c  value of 6.5% or  greater indicates that they may have   diabetes and this should be confirmed with a follow-up   test.     For someone with known diabetes, a value <7% indicates   that their diabetes is well controlled and a value   greater than or equal to 7% indicates suboptimal   control. A1c targets should be individualized based on   duration of diabetes, age, comorbid conditions, and   other considerations.     Currently, no consensus exists regarding use of  hemoglobin A1c for diagnosis of diabetes for children.         11/03/2017 10.7 (H) 4.0 - 5.6 % Final     Comment:     According to ADA guidelines, hemoglobin A1c <7.0% represents  optimal control in non-pregnant diabetic patients. Different  metrics may apply to specific patient populations.   Standards of Medical Care in Diabetes-2016.  For the purpose of screening for the presence of diabetes:  <5.7%     Consistent with the absence of diabetes  5.7-6.4%  Consistent with increasing risk for diabetes   (prediabetes)  >or=6.5%  Consistent with diabetes  Currently, no consensus exists for use of hemoglobin A1c  for diagnosis of diabetes for children.  This Hemoglobin A1c assay has significant interference with fetal   hemoglobin   (HbF). The results are invalid for patients with abnormal amounts of   HbF,   including those with known Hereditary Persistence   of Fetal Hemoglobin. Heterozygous hemoglobin variants (HbAS, HbAC,   HbAD, HbAE, HbA2) do not significantly interfere with this assay;   however, presence of multiple variants in a sample may impact the %   interference.             Review of Systems   Constitutional: Negative for chills and fever.   Cardiovascular: Positive for leg swelling. Negative for claudication.   Respiratory: Negative for shortness of breath.    Skin: Positive for nail changes. Negative for itching and rash.   Musculoskeletal: Positive for arthritis. Negative for muscle cramps, muscle weakness and myalgias.   Gastrointestinal: Negative for  nausea and vomiting.   Neurological: Positive for numbness and paresthesias. Negative for focal weakness and loss of balance.           Objective:      Physical Exam  Constitutional:       General: He is not in acute distress.     Appearance: He is well-developed. He is not diaphoretic.   Cardiovascular:      Pulses:           Dorsalis pedis pulses are 2+ on the right side and 2+ on the left side.        Posterior tibial pulses are 2+ on the right side and 2+ on the left side.      Comments: < 3 sec capillary refill time to toes 1-5 bilateral. Feet are warm to touch proximally with distal cooling b/l. There is no hair growth on the feet and toes b/l. There is moderate edema b/l. No spider veins or varicosities present b/l.     Musculoskeletal:      Comments: Resolved discomfort to palpation at the plantar metatarsal heads area.    Equinus noted b/l ankles with < 10 deg DF noted. MMT 5/5 in DF/PF/Inv/Ev resistance with no reproduction of pain in any direction. Passive range of motion of ankle and pedal joints is painless b/l.     Skin:     General: Skin is warm and dry.      Coloration: Skin is not pale.      Findings: Lesion present. No abrasion, bruising, burn, ecchymosis, erythema, laceration, petechiae or rash.      Nails: There is no clubbing.      Comments: Skin temperature, texture and turgor within normal limits.'    Ulcer Location: Plantar right great toe  Measurements: Pre: 0.9x0.8x0.2 post 1.0x1.0x0.2 cm  Periwound: Intact  Drainage: serosanguinous.  Pus: None.  Malodor: None.  Base:  100% granular. 0% fibrin.  Signs of infection: Mild erythema and warmth       Neurological:      Mental Status: He is alert and oriented to person, place, and time.      Sensory: Sensory deficit present.      Motor: No tremor, atrophy or abnormal muscle tone.      Comments: Negative tinel sign bilateral. Diminished vibratory and protective sensation bilateral.   Psychiatric:         Behavior: Behavior normal.                Assessment:       Encounter Diagnoses   Name Primary?    Type II diabetes mellitus with neurological manifestations Yes    Chronic ulcer of right great toe with fat layer exposed          Plan:       Dre was seen today for diabetic foot ulcer.    Diagnoses and all orders for this visit:    Type II diabetes mellitus with neurological manifestations    Chronic ulcer of right great toe with fat layer exposed      I counseled the patient on his conditions, their implications and medical management.    Shoe inspection. Diabetic Foot Education. Patient reminded of the importance of good nutrition and blood sugar control to help prevent podiatric complications of diabetes. Patient instructed on proper foot hygeine. We discussed wearing proper shoe gear, daily foot inspections, never walking without protective shoe gear, never putting sharp instruments to feet    Wound debrided see attached note and dressed with iodosorb football and darco to offload    Adequate vitamin supplementation, protein intake, and hydration - discussed with patient.    Return for weekly wound care if wound is not improving over the next 1-2 weeks will begin total contact casting    Alfredo Shirley DPM

## 2022-02-22 NOTE — PROCEDURES
"Wound Debridement    Date/Time: 2/22/2022 7:45 AM  Performed by: Alfredo Shirley DPM  Authorized by: Alfredo Shirley DPM     Time out: Immediately prior to procedure a "time out" was called to verify the correct patient, procedure, equipment, support staff and site/side marked as required.    Consent Done?:  Yes (Verbal)  Local anesthesia used?: No      Wound Details:    Location:  Right foot    Location:  Right 1st Toe    Type of Debridement:  Excisional       Length (cm):  1       Area (sq cm):  1       Width (cm):  1       Percent Debrided (%):  100       Depth (cm):  0.2       Total Area Debrided (sq cm):  1    Depth of debridement:  Subcutaneous tissue    Devitalized tissue debrided:  Slough, Fibrin and Biofilm    Instruments:  Curette    Bleeding:  Minimal  Hemostasis Achieved: Yes    Method Used:  Pressure and Silver Nitrate  Patient tolerance:  Patient tolerated the procedure well with no immediate complications      "

## 2022-03-02 ENCOUNTER — CLINICAL SUPPORT (OUTPATIENT)
Dept: PODIATRY | Facility: CLINIC | Age: 52
End: 2022-03-02
Payer: COMMERCIAL

## 2022-03-02 DIAGNOSIS — L97.512 CHRONIC ULCER OF RIGHT GREAT TOE WITH FAT LAYER EXPOSED: Primary | ICD-10-CM

## 2022-03-02 PROCEDURE — 99999 PR PBB SHADOW E&M-EST. PATIENT-LVL III: ICD-10-PCS | Mod: PBBFAC,,,

## 2022-03-02 PROCEDURE — 99999 PR PBB SHADOW E&M-EST. PATIENT-LVL III: CPT | Mod: PBBFAC,,,

## 2022-03-02 NOTE — PROGRESS NOTES
Removed dressing, cleansed area wound NS, no acute changes to wound bed, no signs of infection noted, no complaints of pain expressed, red granular tissue to wound base, mechanical debridement performed to remove any loose non-viable tissue, ernie-wound tissue thickened and intact, applied iodosorb to wound base, double layer felt pad to plantar foot,  covered with football style dressing,  patient tolerated dressing change well    Measurements in centimeters: L0.7 x W0.7 x D0.2

## 2022-03-09 ENCOUNTER — TELEPHONE (OUTPATIENT)
Dept: PODIATRY | Facility: CLINIC | Age: 52
End: 2022-03-09

## 2022-03-09 ENCOUNTER — OFFICE VISIT (OUTPATIENT)
Dept: PODIATRY | Facility: CLINIC | Age: 52
End: 2022-03-09
Payer: COMMERCIAL

## 2022-03-09 DIAGNOSIS — E11.49 TYPE II DIABETES MELLITUS WITH NEUROLOGICAL MANIFESTATIONS: ICD-10-CM

## 2022-03-09 DIAGNOSIS — L97.512 CHRONIC ULCER OF RIGHT GREAT TOE WITH FAT LAYER EXPOSED: Primary | ICD-10-CM

## 2022-03-09 PROCEDURE — 1159F PR MEDICATION LIST DOCUMENTED IN MEDICAL RECORD: ICD-10-PCS | Mod: CPTII,S$GLB,, | Performed by: PODIATRIST

## 2022-03-09 PROCEDURE — 99499 NO LOS: ICD-10-PCS | Mod: S$GLB,,, | Performed by: PODIATRIST

## 2022-03-09 PROCEDURE — 11042 DBRDMT SUBQ TIS 1ST 20SQCM/<: CPT | Mod: T5,S$GLB,, | Performed by: PODIATRIST

## 2022-03-09 PROCEDURE — 1159F MED LIST DOCD IN RCRD: CPT | Mod: CPTII,S$GLB,, | Performed by: PODIATRIST

## 2022-03-09 PROCEDURE — 99999 PR PBB SHADOW E&M-EST. PATIENT-LVL III: ICD-10-PCS | Mod: PBBFAC,,, | Performed by: PODIATRIST

## 2022-03-09 PROCEDURE — 99499 UNLISTED E&M SERVICE: CPT | Mod: S$GLB,,, | Performed by: PODIATRIST

## 2022-03-09 PROCEDURE — 99999 PR PBB SHADOW E&M-EST. PATIENT-LVL III: CPT | Mod: PBBFAC,,, | Performed by: PODIATRIST

## 2022-03-09 PROCEDURE — 1160F RVW MEDS BY RX/DR IN RCRD: CPT | Mod: CPTII,S$GLB,, | Performed by: PODIATRIST

## 2022-03-09 PROCEDURE — 11042 WOUND DEBRIDEMENT: ICD-10-PCS | Mod: T5,S$GLB,, | Performed by: PODIATRIST

## 2022-03-09 PROCEDURE — 1160F PR REVIEW ALL MEDS BY PRESCRIBER/CLIN PHARMACIST DOCUMENTED: ICD-10-PCS | Mod: CPTII,S$GLB,, | Performed by: PODIATRIST

## 2022-03-09 NOTE — TELEPHONE ENCOUNTER
----- Message from Arlyn Banegas sent at 3/9/2022  9:15 AM CST -----  Regarding: advice  Contact: wife  Type: Needs Medical Advice  Who Called:  wifeJustin  Symptoms (please be specific):    How long has patient had these symptoms:    Pharmacy name and phone #:    Best Call Back Number: [phone]  Additional Information: Patient is  currently at an eye appointment, pt state he will be late for scheduled appointment. Patient want to know if he can be seen later today.

## 2022-03-09 NOTE — PROCEDURES
"Wound Debridement    Date/Time: 3/9/2022 9:45 AM  Performed by: Alfredo Shirley DPM  Authorized by: Alfredo Shirley DPM     Time out: Immediately prior to procedure a "time out" was called to verify the correct patient, procedure, equipment, support staff and site/side marked as required.    Consent Done?:  Yes (Verbal)  Local anesthesia used?: No      Wound Details:    Location:  Right foot    Location:  Right 1st Toe    Type of Debridement:  Excisional       Length (cm):  0.8       Area (sq cm):  0.56       Width (cm):  0.7       Percent Debrided (%):  100       Depth (cm):  0.2       Total Area Debrided (sq cm):  0.56    Depth of debridement:  Subcutaneous tissue    Devitalized tissue debrided:  Slough, Callus and Biofilm    Instruments:  Curette and Blade    Bleeding:  Minimal  Hemostasis Achieved: Yes    Method Used:  Pressure  Patient tolerance:  Patient tolerated the procedure well with no immediate complications      "

## 2022-03-09 NOTE — PROGRESS NOTES
Subjective:      Patient ID: Dre Short is a 51 y.o. male.    Chief Complaint: Diabetic Foot Ulcer (R grt toe)    Dre is a 51 y.o. male who presents to the clinic for annual evaluation and treatment of diabetic feet with concern over a new ulcer to the right great toe. Dre has a past medical history of Colon polyps (7/3/2012), Combined hyperlipidemia associated with type 2 diabetes mellitus, Diabetes mellitus type II, uncontrolled, Diverticulitis of colon (7/3/2012), Fatty liver, Hyperlipidemia, Hypertension associated with diabetes, and Sleep apnea. The ulcer to the right toe started as a callus and he noticed the callus for a few months until about 2 weeks ago it began to bleed and opened up into the sore he has now.     2/14/22: Patient returns for follow up right great toe ulcer, ambulating in darco and football no new complaints.     2/22/22: Patient returns for follow up right great toe ulcer, ambulating in darco and football, taking the bactrim as prescribed and no new complaints.    3/9/22: Patient returns for follow up right great toe ulcer no new complaints ambulating in darco and football    PCP: Scott Peñaloza MD    Date Last Seen by PCP: 7/6/2021    Current shoe gear: Casual shoes    Hemoglobin A1C   Date Value Ref Range Status   11/24/2020 10.9 (H) <5.7 % of total Hgb Final     Comment:     For someone without known diabetes, a hemoglobin A1c  value of 6.5% or greater indicates that they may have   diabetes and this should be confirmed with a follow-up   test.     For someone with known diabetes, a value <7% indicates   that their diabetes is well controlled and a value   greater than or equal to 7% indicates suboptimal   control. A1c targets should be individualized based on   duration of diabetes, age, comorbid conditions, and   other considerations.     Currently, no consensus exists regarding use of  hemoglobin A1c for diagnosis of diabetes for children.         04/16/2019 9.5 (H) <5.7 % of  total Hgb Final     Comment:     For someone without known diabetes, a hemoglobin A1c  value of 6.5% or greater indicates that they may have   diabetes and this should be confirmed with a follow-up   test.     For someone with known diabetes, a value <7% indicates   that their diabetes is well controlled and a value   greater than or equal to 7% indicates suboptimal   control. A1c targets should be individualized based on   duration of diabetes, age, comorbid conditions, and   other considerations.     Currently, no consensus exists regarding use of  hemoglobin A1c for diagnosis of diabetes for children.         11/03/2017 10.7 (H) 4.0 - 5.6 % Final     Comment:     According to ADA guidelines, hemoglobin A1c <7.0% represents  optimal control in non-pregnant diabetic patients. Different  metrics may apply to specific patient populations.   Standards of Medical Care in Diabetes-2016.  For the purpose of screening for the presence of diabetes:  <5.7%     Consistent with the absence of diabetes  5.7-6.4%  Consistent with increasing risk for diabetes   (prediabetes)  >or=6.5%  Consistent with diabetes  Currently, no consensus exists for use of hemoglobin A1c  for diagnosis of diabetes for children.  This Hemoglobin A1c assay has significant interference with fetal   hemoglobin   (HbF). The results are invalid for patients with abnormal amounts of   HbF,   including those with known Hereditary Persistence   of Fetal Hemoglobin. Heterozygous hemoglobin variants (HbAS, HbAC,   HbAD, HbAE, HbA2) do not significantly interfere with this assay;   however, presence of multiple variants in a sample may impact the %   interference.             Review of Systems   Constitutional: Negative for chills and fever.   Cardiovascular: Positive for leg swelling. Negative for claudication.   Respiratory: Negative for shortness of breath.    Skin: Positive for nail changes. Negative for itching and rash.   Musculoskeletal: Positive for  arthritis. Negative for muscle cramps, muscle weakness and myalgias.   Gastrointestinal: Negative for nausea and vomiting.   Neurological: Positive for numbness and paresthesias. Negative for focal weakness and loss of balance.           Objective:      Physical Exam  Constitutional:       General: He is not in acute distress.     Appearance: He is well-developed. He is not diaphoretic.   Cardiovascular:      Pulses:           Dorsalis pedis pulses are 2+ on the right side and 2+ on the left side.        Posterior tibial pulses are 2+ on the right side and 2+ on the left side.      Comments: < 3 sec capillary refill time to toes 1-5 bilateral. Feet are warm to touch proximally with distal cooling b/l. There is no hair growth on the feet and toes b/l. There is moderate edema b/l. No spider veins or varicosities present b/l.     Musculoskeletal:      Comments: Resolved discomfort to palpation at the plantar metatarsal heads area.    Equinus noted b/l ankles with < 10 deg DF noted. MMT 5/5 in DF/PF/Inv/Ev resistance with no reproduction of pain in any direction. Passive range of motion of ankle and pedal joints is painless b/l.     Skin:     General: Skin is warm and dry.      Coloration: Skin is not pale.      Findings: Lesion present. No abrasion, bruising, burn, ecchymosis, erythema, laceration, petechiae or rash.      Nails: There is no clubbing.      Comments: Skin temperature, texture and turgor within normal limits.'    Ulcer Location: Plantar right great toe  Measurements: Pre: 0.7x0.6x0.1 post 0.8x0.7x0.2 cm  Periwound: Intact  Drainage: serosanguinous.  Pus: None.  Malodor: None.  Base:  100% granular. 0% fibrin.  Signs of infection: None       Neurological:      Mental Status: He is alert and oriented to person, place, and time.      Sensory: Sensory deficit present.      Motor: No tremor, atrophy or abnormal muscle tone.      Comments: Negative tinel sign bilateral. Diminished vibratory and protective  sensation bilateral.   Psychiatric:         Behavior: Behavior normal.               Assessment:       Encounter Diagnoses   Name Primary?    Chronic ulcer of right great toe with fat layer exposed - Right Foot Yes    Type II diabetes mellitus with neurological manifestations          Plan:       Dre was seen today for diabetic foot ulcer.    Diagnoses and all orders for this visit:    Chronic ulcer of right great toe with fat layer exposed - Right Foot    Type II diabetes mellitus with neurological manifestations      I counseled the patient on his conditions, their implications and medical management.    Shoe inspection. Diabetic Foot Education. Patient reminded of the importance of good nutrition and blood sugar control to help prevent podiatric complications of diabetes. Patient instructed on proper foot hygeine. We discussed wearing proper shoe gear, daily foot inspections, never walking without protective shoe gear, never putting sharp instruments to feet    Wound debrided see attached note and dressed with iodosorb football and darco to offload    Adequate vitamin supplementation, protein intake, and hydration - discussed with patient.    Return for weekly wound care     Alfredo Shirley DPM

## 2022-03-16 ENCOUNTER — OFFICE VISIT (OUTPATIENT)
Dept: PODIATRY | Facility: CLINIC | Age: 52
End: 2022-03-16
Payer: COMMERCIAL

## 2022-03-16 DIAGNOSIS — E11.49 TYPE II DIABETES MELLITUS WITH NEUROLOGICAL MANIFESTATIONS: ICD-10-CM

## 2022-03-16 DIAGNOSIS — L97.512 CHRONIC ULCER OF RIGHT GREAT TOE WITH FAT LAYER EXPOSED: Primary | ICD-10-CM

## 2022-03-16 PROCEDURE — 1159F PR MEDICATION LIST DOCUMENTED IN MEDICAL RECORD: ICD-10-PCS | Mod: CPTII,S$GLB,, | Performed by: PODIATRIST

## 2022-03-16 PROCEDURE — 1159F MED LIST DOCD IN RCRD: CPT | Mod: CPTII,S$GLB,, | Performed by: PODIATRIST

## 2022-03-16 PROCEDURE — 11042 DBRDMT SUBQ TIS 1ST 20SQCM/<: CPT | Mod: S$GLB,,, | Performed by: PODIATRIST

## 2022-03-16 PROCEDURE — 1160F PR REVIEW ALL MEDS BY PRESCRIBER/CLIN PHARMACIST DOCUMENTED: ICD-10-PCS | Mod: CPTII,S$GLB,, | Performed by: PODIATRIST

## 2022-03-16 PROCEDURE — 99499 NO LOS: ICD-10-PCS | Mod: S$GLB,,, | Performed by: PODIATRIST

## 2022-03-16 PROCEDURE — 1160F RVW MEDS BY RX/DR IN RCRD: CPT | Mod: CPTII,S$GLB,, | Performed by: PODIATRIST

## 2022-03-16 PROCEDURE — 99499 UNLISTED E&M SERVICE: CPT | Mod: S$GLB,,, | Performed by: PODIATRIST

## 2022-03-16 PROCEDURE — 99999 PR PBB SHADOW E&M-EST. PATIENT-LVL II: ICD-10-PCS | Mod: PBBFAC,,, | Performed by: PODIATRIST

## 2022-03-16 PROCEDURE — 99999 PR PBB SHADOW E&M-EST. PATIENT-LVL II: CPT | Mod: PBBFAC,,, | Performed by: PODIATRIST

## 2022-03-16 PROCEDURE — 11042 WOUND DEBRIDEMENT: ICD-10-PCS | Mod: S$GLB,,, | Performed by: PODIATRIST

## 2022-03-16 NOTE — PROGRESS NOTES
Subjective:      Patient ID: Dre Short is a 52 y.o. male.    Chief Complaint: No chief complaint on file.    Dre is a 52 y.o. male who presents to the clinic for annual evaluation and treatment of diabetic feet with concern over a new ulcer to the right great toe. Dre has a past medical history of Colon polyps (7/3/2012), Combined hyperlipidemia associated with type 2 diabetes mellitus, Diabetes mellitus type II, uncontrolled, Diverticulitis of colon (7/3/2012), Fatty liver, Hyperlipidemia, Hypertension associated with diabetes, and Sleep apnea. The ulcer to the right toe started as a callus and he noticed the callus for a few months until about 2 weeks ago it began to bleed and opened up into the sore he has now.     2/14/22: Patient returns for follow up right great toe ulcer, ambulating in darco and football no new complaints.     2/22/22: Patient returns for follow up right great toe ulcer, ambulating in darco and football, taking the bactrim as prescribed and no new complaints.    3/9/22: Patient returns for follow up right great toe ulcer no new complaints ambulating in darco and football    3/16/22: Patient returns for follow up right great toe ulcer no new complaints ambulating in darco shoe and football    PCP: Scott Peñaloza MD    Date Last Seen by PCP: 7/6/2021    Current shoe gear: Casual shoes    Hemoglobin A1C   Date Value Ref Range Status   11/24/2020 10.9 (H) <5.7 % of total Hgb Final     Comment:     For someone without known diabetes, a hemoglobin A1c  value of 6.5% or greater indicates that they may have   diabetes and this should be confirmed with a follow-up   test.     For someone with known diabetes, a value <7% indicates   that their diabetes is well controlled and a value   greater than or equal to 7% indicates suboptimal   control. A1c targets should be individualized based on   duration of diabetes, age, comorbid conditions, and   other considerations.     Currently, no consensus  exists regarding use of  hemoglobin A1c for diagnosis of diabetes for children.         04/16/2019 9.5 (H) <5.7 % of total Hgb Final     Comment:     For someone without known diabetes, a hemoglobin A1c  value of 6.5% or greater indicates that they may have   diabetes and this should be confirmed with a follow-up   test.     For someone with known diabetes, a value <7% indicates   that their diabetes is well controlled and a value   greater than or equal to 7% indicates suboptimal   control. A1c targets should be individualized based on   duration of diabetes, age, comorbid conditions, and   other considerations.     Currently, no consensus exists regarding use of  hemoglobin A1c for diagnosis of diabetes for children.         11/03/2017 10.7 (H) 4.0 - 5.6 % Final     Comment:     According to ADA guidelines, hemoglobin A1c <7.0% represents  optimal control in non-pregnant diabetic patients. Different  metrics may apply to specific patient populations.   Standards of Medical Care in Diabetes-2016.  For the purpose of screening for the presence of diabetes:  <5.7%     Consistent with the absence of diabetes  5.7-6.4%  Consistent with increasing risk for diabetes   (prediabetes)  >or=6.5%  Consistent with diabetes  Currently, no consensus exists for use of hemoglobin A1c  for diagnosis of diabetes for children.  This Hemoglobin A1c assay has significant interference with fetal   hemoglobin   (HbF). The results are invalid for patients with abnormal amounts of   HbF,   including those with known Hereditary Persistence   of Fetal Hemoglobin. Heterozygous hemoglobin variants (HbAS, HbAC,   HbAD, HbAE, HbA2) do not significantly interfere with this assay;   however, presence of multiple variants in a sample may impact the %   interference.             Review of Systems   Constitutional: Negative for chills and fever.   Cardiovascular: Positive for leg swelling. Negative for claudication.   Respiratory: Negative for  shortness of breath.    Skin: Positive for nail changes. Negative for itching and rash.   Musculoskeletal: Positive for arthritis. Negative for muscle cramps, muscle weakness and myalgias.   Gastrointestinal: Negative for nausea and vomiting.   Neurological: Positive for numbness and paresthesias. Negative for focal weakness and loss of balance.           Objective:      Physical Exam  Constitutional:       General: He is not in acute distress.     Appearance: He is well-developed. He is not diaphoretic.   Cardiovascular:      Pulses:           Dorsalis pedis pulses are 2+ on the right side and 2+ on the left side.        Posterior tibial pulses are 2+ on the right side and 2+ on the left side.      Comments: < 3 sec capillary refill time to toes 1-5 bilateral. Feet are warm to touch proximally with distal cooling b/l. There is no hair growth on the feet and toes b/l. There is moderate edema b/l. No spider veins or varicosities present b/l.     Musculoskeletal:      Comments: Resolved discomfort to palpation at the plantar metatarsal heads area.    Equinus noted b/l ankles with < 10 deg DF noted. MMT 5/5 in DF/PF/Inv/Ev resistance with no reproduction of pain in any direction. Passive range of motion of ankle and pedal joints is painless b/l.     Skin:     General: Skin is warm and dry.      Coloration: Skin is not pale.      Findings: Lesion present. No abrasion, bruising, burn, ecchymosis, erythema, laceration, petechiae or rash.      Nails: There is no clubbing.      Comments: Skin temperature, texture and turgor within normal limits.'    Ulcer Location: Plantar right great toe  Measurements: Pre: 0.5x0.3x0.1 post 0.6x0.4x0.2 cm  Periwound: Intact  Drainage: serosanguinous.  Pus: None.  Malodor: None.  Base:  100% granular. 0% fibrin.  Signs of infection: None       Neurological:      Mental Status: He is alert and oriented to person, place, and time.      Sensory: Sensory deficit present.      Motor: No tremor,  atrophy or abnormal muscle tone.      Comments: Negative tinel sign bilateral. Diminished vibratory and protective sensation bilateral.   Psychiatric:         Behavior: Behavior normal.               Assessment:       Encounter Diagnoses   Name Primary?    Chronic ulcer of right great toe with fat layer exposed - Right Foot Yes    Type II diabetes mellitus with neurological manifestations          Plan:       Diagnoses and all orders for this visit:    Chronic ulcer of right great toe with fat layer exposed - Right Foot    Type II diabetes mellitus with neurological manifestations      I counseled the patient on his conditions, their implications and medical management.    Shoe inspection. Diabetic Foot Education. Patient reminded of the importance of good nutrition and blood sugar control to help prevent podiatric complications of diabetes. Patient instructed on proper foot hygeine. We discussed wearing proper shoe gear, daily foot inspections, never walking without protective shoe gear, never putting sharp instruments to feet    Wound debrided see attached note and dressed with iodosorb football and darco to offload    Adequate vitamin supplementation, protein intake, and hydration - discussed with patient.    Return for weekly wound care     Alfredo Shirley DPM

## 2022-03-23 ENCOUNTER — OFFICE VISIT (OUTPATIENT)
Dept: PODIATRY | Facility: CLINIC | Age: 52
End: 2022-03-23
Payer: COMMERCIAL

## 2022-03-23 DIAGNOSIS — L97.512 CHRONIC ULCER OF RIGHT GREAT TOE WITH FAT LAYER EXPOSED: Primary | ICD-10-CM

## 2022-03-23 DIAGNOSIS — E11.49 TYPE II DIABETES MELLITUS WITH NEUROLOGICAL MANIFESTATIONS: ICD-10-CM

## 2022-03-23 PROCEDURE — 99999 PR PBB SHADOW E&M-EST. PATIENT-LVL III: ICD-10-PCS | Mod: PBBFAC,,, | Performed by: PODIATRIST

## 2022-03-23 PROCEDURE — 99499 UNLISTED E&M SERVICE: CPT | Mod: S$GLB,,, | Performed by: PODIATRIST

## 2022-03-23 PROCEDURE — 99999 PR PBB SHADOW E&M-EST. PATIENT-LVL III: CPT | Mod: PBBFAC,,, | Performed by: PODIATRIST

## 2022-03-23 PROCEDURE — 1160F PR REVIEW ALL MEDS BY PRESCRIBER/CLIN PHARMACIST DOCUMENTED: ICD-10-PCS | Mod: CPTII,S$GLB,, | Performed by: PODIATRIST

## 2022-03-23 PROCEDURE — 99499 NO LOS: ICD-10-PCS | Mod: S$GLB,,, | Performed by: PODIATRIST

## 2022-03-23 PROCEDURE — 11042 WOUND DEBRIDEMENT: ICD-10-PCS | Mod: T5,S$GLB,, | Performed by: PODIATRIST

## 2022-03-23 PROCEDURE — 1159F MED LIST DOCD IN RCRD: CPT | Mod: CPTII,S$GLB,, | Performed by: PODIATRIST

## 2022-03-23 PROCEDURE — 1160F RVW MEDS BY RX/DR IN RCRD: CPT | Mod: CPTII,S$GLB,, | Performed by: PODIATRIST

## 2022-03-23 PROCEDURE — 11042 DBRDMT SUBQ TIS 1ST 20SQCM/<: CPT | Mod: T5,S$GLB,, | Performed by: PODIATRIST

## 2022-03-23 PROCEDURE — 1159F PR MEDICATION LIST DOCUMENTED IN MEDICAL RECORD: ICD-10-PCS | Mod: CPTII,S$GLB,, | Performed by: PODIATRIST

## 2022-03-23 NOTE — PROCEDURES
"Wound Debridement    Date/Time: 3/23/2022 8:30 AM  Performed by: Alfredo Shirley DPM  Authorized by: Alfredo Shirley DPM     Time out: Immediately prior to procedure a "time out" was called to verify the correct patient, procedure, equipment, support staff and site/side marked as required.    Consent Done?:  Yes (Verbal)  Local anesthesia used?: No      Wound Details:    Location:  Right foot    Location:  Right 1st Toe    Type of Debridement:  Excisional       Length (cm):  0.7       Area (sq cm):  0.21       Width (cm):  0.3       Percent Debrided (%):  100       Depth (cm):  0.2       Total Area Debrided (sq cm):  0.21    Depth of debridement:  Subcutaneous tissue    Devitalized tissue debrided:  Callus, Necrotic/Eschar and Slough    Instruments:  Curette and Blade    Bleeding:  Minimal  Hemostasis Achieved: Yes    Method Used:  Pressure and Silver Nitrate  Patient tolerance:  Patient tolerated the procedure well with no immediate complications      "

## 2022-03-23 NOTE — PROGRESS NOTES
Subjective:      Patient ID: Dre Short is a 51 y.o. male.    Chief Complaint: Follow-up and Diabetes Mellitus (R grt DFU)    Dre is a 51 y.o. male who presents to the clinic for annual evaluation and treatment of diabetic feet with concern over a new ulcer to the right great toe. Dre has a past medical history of Colon polyps (7/3/2012), Combined hyperlipidemia associated with type 2 diabetes mellitus, Diabetes mellitus type II, uncontrolled, Diverticulitis of colon (7/3/2012), Fatty liver, Hyperlipidemia, Hypertension associated with diabetes, and Sleep apnea. The ulcer to the right toe started as a callus and he noticed the callus for a few months until about 2 weeks ago it began to bleed and opened up into the sore he has now.     2/14/22: Patient returns for follow up right great toe ulcer, ambulating in darco and football no new complaints.     2/22/22: Patient returns for follow up right great toe ulcer, ambulating in darco and football, taking the bactrim as prescribed and no new complaints.    3/9/22: Patient returns for follow up right great toe ulcer no new complaints ambulating in darco and football    3/23/22: Patient returns for weekly wound care no new complaints doing well overall in the darco and football    PCP: Scott Peñaloza MD    Date Last Seen by PCP: 7/6/2021    Current shoe gear: Casual shoes    Hemoglobin A1C   Date Value Ref Range Status   11/24/2020 10.9 (H) <5.7 % of total Hgb Final     Comment:     For someone without known diabetes, a hemoglobin A1c  value of 6.5% or greater indicates that they may have   diabetes and this should be confirmed with a follow-up   test.     For someone with known diabetes, a value <7% indicates   that their diabetes is well controlled and a value   greater than or equal to 7% indicates suboptimal   control. A1c targets should be individualized based on   duration of diabetes, age, comorbid conditions, and   other considerations.     Currently, no  consensus exists regarding use of  hemoglobin A1c for diagnosis of diabetes for children.         04/16/2019 9.5 (H) <5.7 % of total Hgb Final     Comment:     For someone without known diabetes, a hemoglobin A1c  value of 6.5% or greater indicates that they may have   diabetes and this should be confirmed with a follow-up   test.     For someone with known diabetes, a value <7% indicates   that their diabetes is well controlled and a value   greater than or equal to 7% indicates suboptimal   control. A1c targets should be individualized based on   duration of diabetes, age, comorbid conditions, and   other considerations.     Currently, no consensus exists regarding use of  hemoglobin A1c for diagnosis of diabetes for children.         11/03/2017 10.7 (H) 4.0 - 5.6 % Final     Comment:     According to ADA guidelines, hemoglobin A1c <7.0% represents  optimal control in non-pregnant diabetic patients. Different  metrics may apply to specific patient populations.   Standards of Medical Care in Diabetes-2016.  For the purpose of screening for the presence of diabetes:  <5.7%     Consistent with the absence of diabetes  5.7-6.4%  Consistent with increasing risk for diabetes   (prediabetes)  >or=6.5%  Consistent with diabetes  Currently, no consensus exists for use of hemoglobin A1c  for diagnosis of diabetes for children.  This Hemoglobin A1c assay has significant interference with fetal   hemoglobin   (HbF). The results are invalid for patients with abnormal amounts of   HbF,   including those with known Hereditary Persistence   of Fetal Hemoglobin. Heterozygous hemoglobin variants (HbAS, HbAC,   HbAD, HbAE, HbA2) do not significantly interfere with this assay;   however, presence of multiple variants in a sample may impact the %   interference.             Review of Systems   Constitutional: Negative for chills and fever.   Cardiovascular: Positive for leg swelling. Negative for claudication.   Respiratory: Negative  for shortness of breath.    Skin: Positive for nail changes. Negative for itching and rash.   Musculoskeletal: Positive for arthritis. Negative for muscle cramps, muscle weakness and myalgias.   Gastrointestinal: Negative for nausea and vomiting.   Neurological: Positive for numbness and paresthesias. Negative for focal weakness and loss of balance.           Objective:      Physical Exam  Constitutional:       General: He is not in acute distress.     Appearance: He is well-developed. He is not diaphoretic.   Cardiovascular:      Pulses:           Dorsalis pedis pulses are 2+ on the right side and 2+ on the left side.        Posterior tibial pulses are 2+ on the right side and 2+ on the left side.      Comments: < 3 sec capillary refill time to toes 1-5 bilateral. Feet are warm to touch proximally with distal cooling b/l. There is no hair growth on the feet and toes b/l. There is moderate edema b/l. No spider veins or varicosities present b/l.     Musculoskeletal:      Comments: Resolved discomfort to palpation at the plantar metatarsal heads area.    Equinus noted b/l ankles with < 10 deg DF noted. MMT 5/5 in DF/PF/Inv/Ev resistance with no reproduction of pain in any direction. Passive range of motion of ankle and pedal joints is painless b/l.     Skin:     General: Skin is warm and dry.      Coloration: Skin is not pale.      Findings: Lesion present. No abrasion, bruising, burn, ecchymosis, erythema, laceration, petechiae or rash.      Nails: There is no clubbing.      Comments: Skin temperature, texture and turgor within normal limits.'    Ulcer Location: Plantar right great toe  Measurements: Pre: 0.6x0.2x0.1 post 0.7x0.3x0.2 cm  Periwound: Intact  Drainage: serosanguinous.  Pus: None.  Malodor: None.  Base:  100% granular. 0% fibrin.  Signs of infection: None       Neurological:      Mental Status: He is alert and oriented to person, place, and time.      Sensory: Sensory deficit present.      Motor: No  tremor, atrophy or abnormal muscle tone.      Comments: Negative tinel sign bilateral. Diminished vibratory and protective sensation bilateral.   Psychiatric:         Behavior: Behavior normal.               Assessment:       Encounter Diagnoses   Name Primary?    Chronic ulcer of right great toe with fat layer exposed - Right Foot Yes    Type II diabetes mellitus with neurological manifestations          Plan:       Dre was seen today for follow-up and diabetes mellitus.    Diagnoses and all orders for this visit:    Chronic ulcer of right great toe with fat layer exposed - Right Foot    Type II diabetes mellitus with neurological manifestations      I counseled the patient on his conditions, their implications and medical management.    Shoe inspection. Diabetic Foot Education. Patient reminded of the importance of good nutrition and blood sugar control to help prevent podiatric complications of diabetes. Patient instructed on proper foot hygeine. We discussed wearing proper shoe gear, daily foot inspections, never walking without protective shoe gear, never putting sharp instruments to feet    Wound debrided see attached note and dressed with iodosorb football and darco to offload    Adequate vitamin supplementation, protein intake, and hydration - discussed with patient.    Return for weekly wound care     Alfredo Shirley DPM

## 2022-03-30 ENCOUNTER — OFFICE VISIT (OUTPATIENT)
Dept: PODIATRY | Facility: CLINIC | Age: 52
End: 2022-03-30
Payer: COMMERCIAL

## 2022-03-30 DIAGNOSIS — E11.49 TYPE II DIABETES MELLITUS WITH NEUROLOGICAL MANIFESTATIONS: ICD-10-CM

## 2022-03-30 DIAGNOSIS — L97.512 CHRONIC ULCER OF RIGHT GREAT TOE WITH FAT LAYER EXPOSED: Primary | ICD-10-CM

## 2022-03-30 PROCEDURE — 99999 PR PBB SHADOW E&M-EST. PATIENT-LVL III: CPT | Mod: PBBFAC,,, | Performed by: PODIATRIST

## 2022-03-30 PROCEDURE — 99499 UNLISTED E&M SERVICE: CPT | Mod: S$GLB,,, | Performed by: PODIATRIST

## 2022-03-30 PROCEDURE — 11042 WOUND DEBRIDEMENT: ICD-10-PCS | Mod: T5,S$GLB,, | Performed by: PODIATRIST

## 2022-03-30 PROCEDURE — 11042 DBRDMT SUBQ TIS 1ST 20SQCM/<: CPT | Mod: T5,S$GLB,, | Performed by: PODIATRIST

## 2022-03-30 PROCEDURE — 99499 NO LOS: ICD-10-PCS | Mod: S$GLB,,, | Performed by: PODIATRIST

## 2022-03-30 PROCEDURE — 1159F MED LIST DOCD IN RCRD: CPT | Mod: CPTII,S$GLB,, | Performed by: PODIATRIST

## 2022-03-30 PROCEDURE — 1159F PR MEDICATION LIST DOCUMENTED IN MEDICAL RECORD: ICD-10-PCS | Mod: CPTII,S$GLB,, | Performed by: PODIATRIST

## 2022-03-30 PROCEDURE — 1160F RVW MEDS BY RX/DR IN RCRD: CPT | Mod: CPTII,S$GLB,, | Performed by: PODIATRIST

## 2022-03-30 PROCEDURE — 99999 PR PBB SHADOW E&M-EST. PATIENT-LVL III: ICD-10-PCS | Mod: PBBFAC,,, | Performed by: PODIATRIST

## 2022-03-30 PROCEDURE — 1160F PR REVIEW ALL MEDS BY PRESCRIBER/CLIN PHARMACIST DOCUMENTED: ICD-10-PCS | Mod: CPTII,S$GLB,, | Performed by: PODIATRIST

## 2022-03-30 NOTE — PROCEDURES
"Wound Debridement    Date/Time: 3/30/2022 8:45 AM  Performed by: Alfredo Shirley DPM  Authorized by: Alfredo Shirley DPM     Time out: Immediately prior to procedure a "time out" was called to verify the correct patient, procedure, equipment, support staff and site/side marked as required.    Consent Done?:  Yes (Verbal)  Local anesthesia used?: No      Wound Details:    Location:  Right foot    Location:  Right 1st Toe    Type of Debridement:  Excisional       Length (cm):  0.5       Area (sq cm):  0.1       Width (cm):  0.2       Percent Debrided (%):  100       Depth (cm):  0.2       Total Area Debrided (sq cm):  0.1    Depth of debridement:  Subcutaneous tissue    Devitalized tissue debrided:  Necrotic/Eschar, Slough and Callus    Instruments:  Curette and Blade    Bleeding:  Minimal  Hemostasis Achieved: Yes    Method Used:  Pressure and Silver Nitrate  Patient tolerance:  Patient tolerated the procedure well with no immediate complications      "

## 2022-03-30 NOTE — PROGRESS NOTES
Subjective:      Patient ID: Dre Short is a 51 y.o. male.    Chief Complaint: Diabetic Foot Ulcer (Right great toe)    Dre is a 51 y.o. male who presents to the clinic for annual evaluation and treatment of diabetic feet with concern over a new ulcer to the right great toe. Dre has a past medical history of Colon polyps (7/3/2012), Combined hyperlipidemia associated with type 2 diabetes mellitus, Diabetes mellitus type II, uncontrolled, Diverticulitis of colon (7/3/2012), Fatty liver, Hyperlipidemia, Hypertension associated with diabetes, and Sleep apnea. The ulcer to the right toe started as a callus and he noticed the callus for a few months until about 2 weeks ago it began to bleed and opened up into the sore he has now.     2/14/22: Patient returns for follow up right great toe ulcer, ambulating in darco and football no new complaints.     2/22/22: Patient returns for follow up right great toe ulcer, ambulating in darco and football, taking the bactrim as prescribed and no new complaints.    3/9/22: Patient returns for follow up right great toe ulcer no new complaints ambulating in darco and football    3/23/22: Patient returns for weekly wound care no new complaints doing well overall in the darco and football    3/30/22: Patient returns for follow up wound care right great toe ulcer in darco and football to offload no new complaints.    PCP: Scott Peñaloza MD    Date Last Seen by PCP: 7/6/2021    Current shoe gear: Casual shoes    Hemoglobin A1C   Date Value Ref Range Status   11/24/2020 10.9 (H) <5.7 % of total Hgb Final     Comment:     For someone without known diabetes, a hemoglobin A1c  value of 6.5% or greater indicates that they may have   diabetes and this should be confirmed with a follow-up   test.     For someone with known diabetes, a value <7% indicates   that their diabetes is well controlled and a value   greater than or equal to 7% indicates suboptimal   control. A1c targets should be  individualized based on   duration of diabetes, age, comorbid conditions, and   other considerations.     Currently, no consensus exists regarding use of  hemoglobin A1c for diagnosis of diabetes for children.         04/16/2019 9.5 (H) <5.7 % of total Hgb Final     Comment:     For someone without known diabetes, a hemoglobin A1c  value of 6.5% or greater indicates that they may have   diabetes and this should be confirmed with a follow-up   test.     For someone with known diabetes, a value <7% indicates   that their diabetes is well controlled and a value   greater than or equal to 7% indicates suboptimal   control. A1c targets should be individualized based on   duration of diabetes, age, comorbid conditions, and   other considerations.     Currently, no consensus exists regarding use of  hemoglobin A1c for diagnosis of diabetes for children.         11/03/2017 10.7 (H) 4.0 - 5.6 % Final     Comment:     According to ADA guidelines, hemoglobin A1c <7.0% represents  optimal control in non-pregnant diabetic patients. Different  metrics may apply to specific patient populations.   Standards of Medical Care in Diabetes-2016.  For the purpose of screening for the presence of diabetes:  <5.7%     Consistent with the absence of diabetes  5.7-6.4%  Consistent with increasing risk for diabetes   (prediabetes)  >or=6.5%  Consistent with diabetes  Currently, no consensus exists for use of hemoglobin A1c  for diagnosis of diabetes for children.  This Hemoglobin A1c assay has significant interference with fetal   hemoglobin   (HbF). The results are invalid for patients with abnormal amounts of   HbF,   including those with known Hereditary Persistence   of Fetal Hemoglobin. Heterozygous hemoglobin variants (HbAS, HbAC,   HbAD, HbAE, HbA2) do not significantly interfere with this assay;   however, presence of multiple variants in a sample may impact the %   interference.             Review of Systems   Constitutional:  Negative for chills and fever.   Cardiovascular: Positive for leg swelling. Negative for claudication.   Respiratory: Negative for shortness of breath.    Skin: Positive for nail changes. Negative for itching and rash.   Musculoskeletal: Positive for arthritis. Negative for muscle cramps, muscle weakness and myalgias.   Gastrointestinal: Negative for nausea and vomiting.   Neurological: Positive for numbness and paresthesias. Negative for focal weakness and loss of balance.           Objective:      Physical Exam  Constitutional:       General: He is not in acute distress.     Appearance: He is well-developed. He is not diaphoretic.   Cardiovascular:      Pulses:           Dorsalis pedis pulses are 2+ on the right side and 2+ on the left side.        Posterior tibial pulses are 2+ on the right side and 2+ on the left side.      Comments: < 3 sec capillary refill time to toes 1-5 bilateral. Feet are warm to touch proximally with distal cooling b/l. There is no hair growth on the feet and toes b/l. There is moderate edema b/l. No spider veins or varicosities present b/l.     Musculoskeletal:      Comments: Resolved discomfort to palpation at the plantar metatarsal heads area.    Equinus noted b/l ankles with < 10 deg DF noted. MMT 5/5 in DF/PF/Inv/Ev resistance with no reproduction of pain in any direction. Passive range of motion of ankle and pedal joints is painless b/l.     Skin:     General: Skin is warm and dry.      Coloration: Skin is not pale.      Findings: Lesion present. No abrasion, bruising, burn, ecchymosis, erythema, laceration, petechiae or rash.      Nails: There is no clubbing.      Comments: Skin temperature, texture and turgor within normal limits.'    Ulcer Location: Plantar right great toe  Measurements: Pre: 0.4x0.2x0.1 post 0.5x0.2x0.2 cm  Periwound: Intact  Drainage: serosanguinous.  Pus: None.  Malodor: None.  Base:  100% granular. 0% fibrin.  Signs of infection: None       Neurological:       Mental Status: He is alert and oriented to person, place, and time.      Sensory: Sensory deficit present.      Motor: No tremor, atrophy or abnormal muscle tone.      Comments: Negative tinel sign bilateral. Diminished vibratory and protective sensation bilateral.   Psychiatric:         Behavior: Behavior normal.               Assessment:       Encounter Diagnoses   Name Primary?    Chronic ulcer of right great toe with fat layer exposed - Right Foot Yes    Type II diabetes mellitus with neurological manifestations          Plan:       Dre was seen today for diabetic foot ulcer.    Diagnoses and all orders for this visit:    Chronic ulcer of right great toe with fat layer exposed - Right Foot  -     Wound Debridement    Type II diabetes mellitus with neurological manifestations      I counseled the patient on his conditions, their implications and medical management.    Shoe inspection. Diabetic Foot Education. Patient reminded of the importance of good nutrition and blood sugar control to help prevent podiatric complications of diabetes. Patient instructed on proper foot hygeine. We discussed wearing proper shoe gear, daily foot inspections, never walking without protective shoe gear, never putting sharp instruments to feet    Wound debrided see attached note and dressed with rashmi football and darco to offload    Adequate vitamin supplementation, protein intake, and hydration - discussed with patient.    Return for weekly wound care     Alfredo Shirley DPM

## 2022-04-04 ENCOUNTER — PATIENT MESSAGE (OUTPATIENT)
Dept: PODIATRY | Facility: CLINIC | Age: 52
End: 2022-04-04
Payer: COMMERCIAL

## 2022-04-04 NOTE — PROCEDURES
"Wound Debridement    Date/Time: 3/16/2022 5:00 PM  Performed by: Alfredo Shirley DPM  Authorized by: Alfredo Shirley DPM     Time out: Immediately prior to procedure a "time out" was called to verify the correct patient, procedure, equipment, support staff and site/side marked as required.    Consent Done?:  Yes (Verbal)  Local anesthesia used?: No      Wound Details:    Location:  Right foot    Location:  Right 1st Toe    Type of Debridement:  Excisional       Length (cm):  0.6       Area (sq cm):  0.24       Width (cm):  0.4       Percent Debrided (%):  100       Depth (cm):  0.2       Total Area Debrided (sq cm):  0.24    Depth of debridement:  Subcutaneous tissue    Devitalized tissue debrided:  Callus, Necrotic/Eschar and Slough    Instruments:  Curette and Blade    Bleeding:  Minimal  Hemostasis Achieved: Yes    Method Used:  Pressure  Patient tolerance:  Patient tolerated the procedure well with no immediate complications      "

## 2022-04-06 ENCOUNTER — OFFICE VISIT (OUTPATIENT)
Dept: PODIATRY | Facility: CLINIC | Age: 52
End: 2022-04-06
Payer: COMMERCIAL

## 2022-04-06 DIAGNOSIS — E11.49 TYPE II DIABETES MELLITUS WITH NEUROLOGICAL MANIFESTATIONS: ICD-10-CM

## 2022-04-06 DIAGNOSIS — L97.512 CHRONIC ULCER OF RIGHT GREAT TOE WITH FAT LAYER EXPOSED: Primary | ICD-10-CM

## 2022-04-06 PROCEDURE — 99499 NO LOS: ICD-10-PCS | Mod: S$GLB,,, | Performed by: PODIATRIST

## 2022-04-06 PROCEDURE — 11042 WOUND DEBRIDEMENT: ICD-10-PCS | Mod: T5,S$GLB,, | Performed by: PODIATRIST

## 2022-04-06 PROCEDURE — 99999 PR PBB SHADOW E&M-EST. PATIENT-LVL III: ICD-10-PCS | Mod: PBBFAC,,, | Performed by: PODIATRIST

## 2022-04-06 PROCEDURE — 99999 PR PBB SHADOW E&M-EST. PATIENT-LVL III: CPT | Mod: PBBFAC,,, | Performed by: PODIATRIST

## 2022-04-06 PROCEDURE — 99499 UNLISTED E&M SERVICE: CPT | Mod: S$GLB,,, | Performed by: PODIATRIST

## 2022-04-06 PROCEDURE — 11042 DBRDMT SUBQ TIS 1ST 20SQCM/<: CPT | Mod: T5,S$GLB,, | Performed by: PODIATRIST

## 2022-04-06 NOTE — PROGRESS NOTES
Subjective:      Patient ID: Dre Short is a 52 y.o. male.    Chief Complaint: Diabetic Foot Ulcer (Right great toe )    Dre is a 52 y.o. male who presents to the clinic for annual evaluation and treatment of diabetic feet with concern over a new ulcer to the right great toe. Dre has a past medical history of Colon polyps (7/3/2012), Combined hyperlipidemia associated with type 2 diabetes mellitus, Diabetes mellitus type II, uncontrolled, Diverticulitis of colon (7/3/2012), Fatty liver, Hyperlipidemia, Hypertension associated with diabetes, and Sleep apnea. The ulcer to the right toe started as a callus and he noticed the callus for a few months until about 2 weeks ago it began to bleed and opened up into the sore he has now.     2/14/22: Patient returns for follow up right great toe ulcer, ambulating in darco and football no new complaints.     2/22/22: Patient returns for follow up right great toe ulcer, ambulating in darco and football, taking the bactrim as prescribed and no new complaints.    3/9/22: Patient returns for follow up right great toe ulcer no new complaints ambulating in darco and football    3/23/22: Patient returns for weekly wound care no new complaints doing well overall in the darco and football    3/30/22: Patient returns for follow up wound care right great toe ulcer in darco and football to offload no new complaints.    4/6/22: Patient returns for wound care right great toe ulcer, no new complaints in darco and football    PCP: Scott Peñaloza MD    Date Last Seen by PCP: 7/6/2021    Current shoe gear: Casual shoes    Hemoglobin A1C   Date Value Ref Range Status   11/24/2020 10.9 (H) <5.7 % of total Hgb Final     Comment:     For someone without known diabetes, a hemoglobin A1c  value of 6.5% or greater indicates that they may have   diabetes and this should be confirmed with a follow-up   test.     For someone with known diabetes, a value <7% indicates   that their diabetes is well  controlled and a value   greater than or equal to 7% indicates suboptimal   control. A1c targets should be individualized based on   duration of diabetes, age, comorbid conditions, and   other considerations.     Currently, no consensus exists regarding use of  hemoglobin A1c for diagnosis of diabetes for children.         04/16/2019 9.5 (H) <5.7 % of total Hgb Final     Comment:     For someone without known diabetes, a hemoglobin A1c  value of 6.5% or greater indicates that they may have   diabetes and this should be confirmed with a follow-up   test.     For someone with known diabetes, a value <7% indicates   that their diabetes is well controlled and a value   greater than or equal to 7% indicates suboptimal   control. A1c targets should be individualized based on   duration of diabetes, age, comorbid conditions, and   other considerations.     Currently, no consensus exists regarding use of  hemoglobin A1c for diagnosis of diabetes for children.         11/03/2017 10.7 (H) 4.0 - 5.6 % Final     Comment:     According to ADA guidelines, hemoglobin A1c <7.0% represents  optimal control in non-pregnant diabetic patients. Different  metrics may apply to specific patient populations.   Standards of Medical Care in Diabetes-2016.  For the purpose of screening for the presence of diabetes:  <5.7%     Consistent with the absence of diabetes  5.7-6.4%  Consistent with increasing risk for diabetes   (prediabetes)  >or=6.5%  Consistent with diabetes  Currently, no consensus exists for use of hemoglobin A1c  for diagnosis of diabetes for children.  This Hemoglobin A1c assay has significant interference with fetal   hemoglobin   (HbF). The results are invalid for patients with abnormal amounts of   HbF,   including those with known Hereditary Persistence   of Fetal Hemoglobin. Heterozygous hemoglobin variants (HbAS, HbAC,   HbAD, HbAE, HbA2) do not significantly interfere with this assay;   however, presence of multiple  variants in a sample may impact the %   interference.             Review of Systems   Constitutional: Negative for chills and fever.   Cardiovascular: Positive for leg swelling. Negative for claudication.   Respiratory: Negative for shortness of breath.    Skin: Positive for nail changes. Negative for itching and rash.   Musculoskeletal: Positive for arthritis. Negative for muscle cramps, muscle weakness and myalgias.   Gastrointestinal: Negative for nausea and vomiting.   Neurological: Positive for numbness and paresthesias. Negative for focal weakness and loss of balance.           Objective:      Physical Exam  Constitutional:       General: He is not in acute distress.     Appearance: He is well-developed. He is not diaphoretic.   Cardiovascular:      Pulses:           Dorsalis pedis pulses are 2+ on the right side and 2+ on the left side.        Posterior tibial pulses are 2+ on the right side and 2+ on the left side.      Comments: < 3 sec capillary refill time to toes 1-5 bilateral. Feet are warm to touch proximally with distal cooling b/l. There is no hair growth on the feet and toes b/l. There is moderate edema b/l. No spider veins or varicosities present b/l.     Musculoskeletal:      Comments: Resolved discomfort to palpation at the plantar metatarsal heads area.    Equinus noted b/l ankles with < 10 deg DF noted. MMT 5/5 in DF/PF/Inv/Ev resistance with no reproduction of pain in any direction. Passive range of motion of ankle and pedal joints is painless b/l.     Skin:     General: Skin is warm and dry.      Coloration: Skin is not pale.      Findings: Lesion present. No abrasion, bruising, burn, ecchymosis, erythema, laceration, petechiae or rash.      Nails: There is no clubbing.      Comments: Skin temperature, texture and turgor within normal limits.'    Ulcer Location: Plantar right great toe  Measurements: Pre: 0.3x0.2x0.1 post 0.4x0.2x0.2 cm  Periwound: Intact  Drainage: serosanguinous.  Pus:  None.  Malodor: None.  Base:  100% granular. 0% fibrin.  Signs of infection: None       Neurological:      Mental Status: He is alert and oriented to person, place, and time.      Sensory: Sensory deficit present.      Motor: No tremor, atrophy or abnormal muscle tone.      Comments: Negative tinel sign bilateral. Diminished vibratory and protective sensation bilateral.   Psychiatric:         Behavior: Behavior normal.               Assessment:       Encounter Diagnoses   Name Primary?    Chronic ulcer of right great toe with fat layer exposed - Right Foot Yes    Type II diabetes mellitus with neurological manifestations          Plan:       Dre was seen today for diabetic foot ulcer.    Diagnoses and all orders for this visit:    Chronic ulcer of right great toe with fat layer exposed - Right Foot  -     Wound Debridement    Type II diabetes mellitus with neurological manifestations      I counseled the patient on his conditions, their implications and medical management.    Shoe inspection. Diabetic Foot Education. Patient reminded of the importance of good nutrition and blood sugar control to help prevent podiatric complications of diabetes. Patient instructed on proper foot hygeine. We discussed wearing proper shoe gear, daily foot inspections, never walking without protective shoe gear, never putting sharp instruments to feet    Wound debrided see attached note and dressed with rashmi football and darco to offload    Adequate vitamin supplementation, protein intake, and hydration - discussed with patient.    Return for weekly wound care     Alfredo Shirley DPM

## 2022-04-11 ENCOUNTER — TELEPHONE (OUTPATIENT)
Dept: PODIATRY | Facility: CLINIC | Age: 52
End: 2022-04-11
Payer: COMMERCIAL

## 2022-04-11 NOTE — TELEPHONE ENCOUNTER
----- Message from Donna Case sent at 4/11/2022  3:15 PM CDT -----  Contact: Pt at [phone]  Type: Needs Medical Advice  Who Called:  Pt    Best Call Back Number: [phone]    Additional Information: Pt is calling because he would like PAULINA to call him back and reschedule his appt. Pls call back and advise.

## 2022-04-13 ENCOUNTER — OFFICE VISIT (OUTPATIENT)
Dept: PODIATRY | Facility: CLINIC | Age: 52
End: 2022-04-13
Payer: COMMERCIAL

## 2022-04-13 DIAGNOSIS — L97.512 CHRONIC ULCER OF RIGHT GREAT TOE WITH FAT LAYER EXPOSED: Primary | ICD-10-CM

## 2022-04-13 DIAGNOSIS — E11.49 TYPE II DIABETES MELLITUS WITH NEUROLOGICAL MANIFESTATIONS: ICD-10-CM

## 2022-04-13 PROCEDURE — 1159F PR MEDICATION LIST DOCUMENTED IN MEDICAL RECORD: ICD-10-PCS | Mod: CPTII,S$GLB,, | Performed by: PODIATRIST

## 2022-04-13 PROCEDURE — 1160F RVW MEDS BY RX/DR IN RCRD: CPT | Mod: CPTII,S$GLB,, | Performed by: PODIATRIST

## 2022-04-13 PROCEDURE — 99499 UNLISTED E&M SERVICE: CPT | Mod: S$GLB,,, | Performed by: PODIATRIST

## 2022-04-13 PROCEDURE — 99499 NO LOS: ICD-10-PCS | Mod: S$GLB,,, | Performed by: PODIATRIST

## 2022-04-13 PROCEDURE — 99999 PR PBB SHADOW E&M-EST. PATIENT-LVL III: ICD-10-PCS | Mod: PBBFAC,,, | Performed by: PODIATRIST

## 2022-04-13 PROCEDURE — 1160F PR REVIEW ALL MEDS BY PRESCRIBER/CLIN PHARMACIST DOCUMENTED: ICD-10-PCS | Mod: CPTII,S$GLB,, | Performed by: PODIATRIST

## 2022-04-13 PROCEDURE — 99999 PR PBB SHADOW E&M-EST. PATIENT-LVL III: CPT | Mod: PBBFAC,,, | Performed by: PODIATRIST

## 2022-04-13 PROCEDURE — 11042 DBRDMT SUBQ TIS 1ST 20SQCM/<: CPT | Mod: T5,S$GLB,, | Performed by: PODIATRIST

## 2022-04-13 PROCEDURE — 11042 WOUND DEBRIDEMENT: ICD-10-PCS | Mod: T5,S$GLB,, | Performed by: PODIATRIST

## 2022-04-13 PROCEDURE — 1159F MED LIST DOCD IN RCRD: CPT | Mod: CPTII,S$GLB,, | Performed by: PODIATRIST

## 2022-04-21 ENCOUNTER — OFFICE VISIT (OUTPATIENT)
Dept: PODIATRY | Facility: CLINIC | Age: 52
End: 2022-04-21
Payer: COMMERCIAL

## 2022-04-21 DIAGNOSIS — E11.49 TYPE II DIABETES MELLITUS WITH NEUROLOGICAL MANIFESTATIONS: ICD-10-CM

## 2022-04-21 DIAGNOSIS — L97.512 CHRONIC ULCER OF RIGHT GREAT TOE WITH FAT LAYER EXPOSED: Primary | ICD-10-CM

## 2022-04-21 PROCEDURE — 1160F RVW MEDS BY RX/DR IN RCRD: CPT | Mod: CPTII,S$GLB,, | Performed by: PODIATRIST

## 2022-04-21 PROCEDURE — 1160F PR REVIEW ALL MEDS BY PRESCRIBER/CLIN PHARMACIST DOCUMENTED: ICD-10-PCS | Mod: CPTII,S$GLB,, | Performed by: PODIATRIST

## 2022-04-21 PROCEDURE — 99999 PR PBB SHADOW E&M-EST. PATIENT-LVL III: ICD-10-PCS | Mod: PBBFAC,,, | Performed by: PODIATRIST

## 2022-04-21 PROCEDURE — 1159F MED LIST DOCD IN RCRD: CPT | Mod: CPTII,S$GLB,, | Performed by: PODIATRIST

## 2022-04-21 PROCEDURE — 99212 PR OFFICE/OUTPT VISIT, EST, LEVL II, 10-19 MIN: ICD-10-PCS | Mod: S$GLB,,, | Performed by: PODIATRIST

## 2022-04-21 PROCEDURE — 1159F PR MEDICATION LIST DOCUMENTED IN MEDICAL RECORD: ICD-10-PCS | Mod: CPTII,S$GLB,, | Performed by: PODIATRIST

## 2022-04-21 PROCEDURE — 99999 PR PBB SHADOW E&M-EST. PATIENT-LVL III: CPT | Mod: PBBFAC,,, | Performed by: PODIATRIST

## 2022-04-21 PROCEDURE — 99212 OFFICE O/P EST SF 10 MIN: CPT | Mod: S$GLB,,, | Performed by: PODIATRIST

## 2022-04-21 NOTE — PROGRESS NOTES
Subjective:      Patient ID: Dre Short is a 52 y.o. male.    Chief Complaint: Follow-up (DFU)    Dre is a 52 y.o. male     4/21/22: Patient returns for follow up ulcer treatment ambulating in darco and football no new complaints    PCP: Scott Peñaloza MD    Date Last Seen by PCP: 7/6/2021    Current shoe gear: Casual shoes    Hemoglobin A1C   Date Value Ref Range Status   11/24/2020 10.9 (H) <5.7 % of total Hgb Final     Comment:     For someone without known diabetes, a hemoglobin A1c  value of 6.5% or greater indicates that they may have   diabetes and this should be confirmed with a follow-up   test.     For someone with known diabetes, a value <7% indicates   that their diabetes is well controlled and a value   greater than or equal to 7% indicates suboptimal   control. A1c targets should be individualized based on   duration of diabetes, age, comorbid conditions, and   other considerations.     Currently, no consensus exists regarding use of  hemoglobin A1c for diagnosis of diabetes for children.         04/16/2019 9.5 (H) <5.7 % of total Hgb Final     Comment:     For someone without known diabetes, a hemoglobin A1c  value of 6.5% or greater indicates that they may have   diabetes and this should be confirmed with a follow-up   test.     For someone with known diabetes, a value <7% indicates   that their diabetes is well controlled and a value   greater than or equal to 7% indicates suboptimal   control. A1c targets should be individualized based on   duration of diabetes, age, comorbid conditions, and   other considerations.     Currently, no consensus exists regarding use of  hemoglobin A1c for diagnosis of diabetes for children.         11/03/2017 10.7 (H) 4.0 - 5.6 % Final     Comment:     According to ADA guidelines, hemoglobin A1c <7.0% represents  optimal control in non-pregnant diabetic patients. Different  metrics may apply to specific patient populations.   Standards of Medical Care in  Diabetes-2016.  For the purpose of screening for the presence of diabetes:  <5.7%     Consistent with the absence of diabetes  5.7-6.4%  Consistent with increasing risk for diabetes   (prediabetes)  >or=6.5%  Consistent with diabetes  Currently, no consensus exists for use of hemoglobin A1c  for diagnosis of diabetes for children.  This Hemoglobin A1c assay has significant interference with fetal   hemoglobin   (HbF). The results are invalid for patients with abnormal amounts of   HbF,   including those with known Hereditary Persistence   of Fetal Hemoglobin. Heterozygous hemoglobin variants (HbAS, HbAC,   HbAD, HbAE, HbA2) do not significantly interfere with this assay;   however, presence of multiple variants in a sample may impact the %   interference.             Review of Systems   Constitutional: Negative for chills and fever.   Cardiovascular: Positive for leg swelling. Negative for claudication.   Respiratory: Negative for shortness of breath.    Skin: Positive for nail changes. Negative for itching and rash.   Musculoskeletal: Positive for arthritis. Negative for muscle cramps, muscle weakness and myalgias.   Gastrointestinal: Negative for nausea and vomiting.   Neurological: Positive for numbness and paresthesias. Negative for focal weakness and loss of balance.           Objective:      Physical Exam  Constitutional:       General: He is not in acute distress.     Appearance: He is well-developed. He is not diaphoretic.   Cardiovascular:      Pulses:           Dorsalis pedis pulses are 2+ on the right side and 2+ on the left side.        Posterior tibial pulses are 2+ on the right side and 2+ on the left side.      Comments: < 3 sec capillary refill time to toes 1-5 bilateral. Feet are warm to touch proximally with distal cooling b/l. There is no hair growth on the feet and toes b/l. There is moderate edema b/l. No spider veins or varicosities present b/l.     Musculoskeletal:      Comments: Resolved  discomfort to palpation at the plantar metatarsal heads area.    Equinus noted b/l ankles with < 10 deg DF noted. MMT 5/5 in DF/PF/Inv/Ev resistance with no reproduction of pain in any direction. Passive range of motion of ankle and pedal joints is painless b/l.     Skin:     General: Skin is warm and dry.      Coloration: Skin is not pale.      Findings: Lesion present. No abrasion, bruising, burn, ecchymosis, erythema, laceration, petechiae or rash.      Nails: There is no clubbing.      Comments: Skin temperature, texture and turgor within normal limits.'    Ulcer Location: Plantar right great toe  Measurements: 0 x 0 x 0 cm with fragile epithelium  Periwound: Intact  Drainage: None.  Pus: None.  Malodor: None.  Base:  100% epithelial tissue.  Signs of infection: None.           Neurological:      Mental Status: He is alert and oriented to person, place, and time.      Sensory: Sensory deficit present.      Motor: No tremor, atrophy or abnormal muscle tone.      Comments: Negative tinel sign bilateral. Diminished vibratory and protective sensation bilateral.   Psychiatric:         Behavior: Behavior normal.               Assessment:       Encounter Diagnoses   Name Primary?    Chronic ulcer of right great toe with fat layer exposed - Right Foot Yes    Type II diabetes mellitus with neurological manifestations          Plan:       Dre was seen today for follow-up.    Diagnoses and all orders for this visit:    Chronic ulcer of right great toe with fat layer exposed - Right Foot    Type II diabetes mellitus with neurological manifestations      I counseled the patient on his conditions, their implications and medical management.    Shoe inspection. Diabetic Foot Education. Patient reminded of the importance of good nutrition and blood sugar control to help prevent podiatric complications of diabetes. Patient instructed on proper foot hygeine. We discussed wearing proper shoe gear, daily foot inspections, never  walking without protective shoe gear, never putting sharp instruments to feet    Wound healed but fragile will offload in darco and football one more week before going back to shoes    Adequate vitamin supplementation, protein intake, and hydration - discussed with patient.    Return for weekly wound care     Alfredo Shirley DPM

## 2022-04-24 NOTE — PROCEDURES
"Wound Debridement    Date/Time: 4/6/2022 5:45 PM  Performed by: Alfredo Shirley DPM  Authorized by: Alfredo Shirley DPM     Time out: Immediately prior to procedure a "time out" was called to verify the correct patient, procedure, equipment, support staff and site/side marked as required.    Consent Done?:  Yes (Verbal)  Local anesthesia used?: No      Wound Details:    Location:  Right foot    Location:  Right 1st Toe    Type of Debridement:  Excisional       Length (cm):  0.4       Area (sq cm):  0.08       Width (cm):  0.2       Percent Debrided (%):  100       Depth (cm):  0.2       Total Area Debrided (sq cm):  0.08    Depth of debridement:  Subcutaneous tissue    Devitalized tissue debrided:  Callus, Slough and Necrotic/Eschar    Instruments:  Curette and Blade    Bleeding:  Minimal  Hemostasis Achieved: Yes    Method Used:  Pressure  Patient tolerance:  Patient tolerated the procedure well with no immediate complications      "

## 2022-04-25 NOTE — PROCEDURES
"Wound Debridement    Date/Time: 4/13/2022 4:30 PM  Performed by: Alfredo Shirley DPM  Authorized by: Alfredo Shirley DPM     Time out: Immediately prior to procedure a "time out" was called to verify the correct patient, procedure, equipment, support staff and site/side marked as required.    Consent Done?:  Yes (Verbal)  Local anesthesia used?: No      Wound Details:    Location:  Right foot    Location:  Right 1st Toe    Type of Debridement:  Excisional       Length (cm):  0.3       Area (sq cm):  0.06       Width (cm):  0.2       Percent Debrided (%):  100       Depth (cm):  0.2       Total Area Debrided (sq cm):  0.06    Depth of debridement:  Subcutaneous tissue    Instruments:  Curette and Blade    Bleeding:  Minimal  Hemostasis Achieved: Yes    Method Used:  Pressure  Patient tolerance:  Patient tolerated the procedure well with no immediate complications      "

## 2022-04-25 NOTE — PROGRESS NOTES
Subjective:      Patient ID: Dre Short is a 52 y.o. male.    Chief Complaint: Diabetic Foot Ulcer (Right great toe)    Dre is a 52 y.o. male who presents to the clinic for annual evaluation and treatment of diabetic feet with concern over a new ulcer to the right great toe. Dre has a past medical history of Colon polyps (7/3/2012), Combined hyperlipidemia associated with type 2 diabetes mellitus, Diabetes mellitus type II, uncontrolled, Diverticulitis of colon (7/3/2012), Fatty liver, Hyperlipidemia, Hypertension associated with diabetes, and Sleep apnea. The ulcer to the right toe started as a callus and he noticed the callus for a few months until about 2 weeks ago it began to bleed and opened up into the sore he has now.     2/14/22: Patient returns for follow up right great toe ulcer, ambulating in darco and football no new complaints.     2/22/22: Patient returns for follow up right great toe ulcer, ambulating in darco and football, taking the bactrim as prescribed and no new complaints.    3/9/22: Patient returns for follow up right great toe ulcer no new complaints ambulating in darco and football    3/23/22: Patient returns for weekly wound care no new complaints doing well overall in the darco and football    3/30/22: Patient returns for follow up wound care right great toe ulcer in darco and football to offload no new complaints.    4/6/22: Patient returns for wound care right great toe ulcer, no new complaints in darco and football    4/13/22: Patient returns for wound care right great toe ulcer no new complaints.    PCP: Scott Peñaloza MD    Date Last Seen by PCP: 7/6/2021    Current shoe gear: Casual shoes    Hemoglobin A1C   Date Value Ref Range Status   11/24/2020 10.9 (H) <5.7 % of total Hgb Final     Comment:     For someone without known diabetes, a hemoglobin A1c  value of 6.5% or greater indicates that they may have   diabetes and this should be confirmed with a follow-up   test.     For  someone with known diabetes, a value <7% indicates   that their diabetes is well controlled and a value   greater than or equal to 7% indicates suboptimal   control. A1c targets should be individualized based on   duration of diabetes, age, comorbid conditions, and   other considerations.     Currently, no consensus exists regarding use of  hemoglobin A1c for diagnosis of diabetes for children.         04/16/2019 9.5 (H) <5.7 % of total Hgb Final     Comment:     For someone without known diabetes, a hemoglobin A1c  value of 6.5% or greater indicates that they may have   diabetes and this should be confirmed with a follow-up   test.     For someone with known diabetes, a value <7% indicates   that their diabetes is well controlled and a value   greater than or equal to 7% indicates suboptimal   control. A1c targets should be individualized based on   duration of diabetes, age, comorbid conditions, and   other considerations.     Currently, no consensus exists regarding use of  hemoglobin A1c for diagnosis of diabetes for children.         11/03/2017 10.7 (H) 4.0 - 5.6 % Final     Comment:     According to ADA guidelines, hemoglobin A1c <7.0% represents  optimal control in non-pregnant diabetic patients. Different  metrics may apply to specific patient populations.   Standards of Medical Care in Diabetes-2016.  For the purpose of screening for the presence of diabetes:  <5.7%     Consistent with the absence of diabetes  5.7-6.4%  Consistent with increasing risk for diabetes   (prediabetes)  >or=6.5%  Consistent with diabetes  Currently, no consensus exists for use of hemoglobin A1c  for diagnosis of diabetes for children.  This Hemoglobin A1c assay has significant interference with fetal   hemoglobin   (HbF). The results are invalid for patients with abnormal amounts of   HbF,   including those with known Hereditary Persistence   of Fetal Hemoglobin. Heterozygous hemoglobin variants (HbAS, HbAC,   HbAD, HbAE, HbA2)  do not significantly interfere with this assay;   however, presence of multiple variants in a sample may impact the %   interference.             Review of Systems   Constitutional: Negative for chills and fever.   Cardiovascular: Positive for leg swelling. Negative for claudication.   Respiratory: Negative for shortness of breath.    Skin: Positive for nail changes. Negative for itching and rash.   Musculoskeletal: Positive for arthritis. Negative for muscle cramps, muscle weakness and myalgias.   Gastrointestinal: Negative for nausea and vomiting.   Neurological: Positive for numbness and paresthesias. Negative for focal weakness and loss of balance.           Objective:      Physical Exam  Constitutional:       General: He is not in acute distress.     Appearance: He is well-developed. He is not diaphoretic.   Cardiovascular:      Pulses:           Dorsalis pedis pulses are 2+ on the right side and 2+ on the left side.        Posterior tibial pulses are 2+ on the right side and 2+ on the left side.      Comments: < 3 sec capillary refill time to toes 1-5 bilateral. Feet are warm to touch proximally with distal cooling b/l. There is no hair growth on the feet and toes b/l. There is moderate edema b/l. No spider veins or varicosities present b/l.     Musculoskeletal:      Comments: Resolved discomfort to palpation at the plantar metatarsal heads area.    Equinus noted b/l ankles with < 10 deg DF noted. MMT 5/5 in DF/PF/Inv/Ev resistance with no reproduction of pain in any direction. Passive range of motion of ankle and pedal joints is painless b/l.     Skin:     General: Skin is warm and dry.      Coloration: Skin is not pale.      Findings: Lesion present. No abrasion, bruising, burn, ecchymosis, erythema, laceration, petechiae or rash.      Nails: There is no clubbing.      Comments: Skin temperature, texture and turgor within normal limits.'    Ulcer Location: Plantar right great toe  Measurements: Pre:  0.2x0.2x0.1 post 0.3x0.2x0.2 cm  Periwound: Intact  Drainage: serosanguinous.  Pus: None.  Malodor: None.  Base:  100% granular. 0% fibrin.  Signs of infection: None       Neurological:      Mental Status: He is alert and oriented to person, place, and time.      Sensory: Sensory deficit present.      Motor: No tremor, atrophy or abnormal muscle tone.      Comments: Negative tinel sign bilateral. Diminished vibratory and protective sensation bilateral.   Psychiatric:         Behavior: Behavior normal.               Assessment:       Encounter Diagnoses   Name Primary?    Chronic ulcer of right great toe with fat layer exposed - Right Foot Yes    Type II diabetes mellitus with neurological manifestations          Plan:       Dre was seen today for diabetic foot ulcer.    Diagnoses and all orders for this visit:    Chronic ulcer of right great toe with fat layer exposed - Right Foot  -     Wound Debridement    Type II diabetes mellitus with neurological manifestations      I counseled the patient on his conditions, their implications and medical management.    Shoe inspection. Diabetic Foot Education. Patient reminded of the importance of good nutrition and blood sugar control to help prevent podiatric complications of diabetes. Patient instructed on proper foot hygeine. We discussed wearing proper shoe gear, daily foot inspections, never walking without protective shoe gear, never putting sharp instruments to feet    Wound debrided see attached note and dressed with rashmi football and darco to offload    Adequate vitamin supplementation, protein intake, and hydration - discussed with patient.    Return for weekly wound care     Alfredo Shirley DPM

## 2022-04-27 ENCOUNTER — OFFICE VISIT (OUTPATIENT)
Dept: PODIATRY | Facility: CLINIC | Age: 52
End: 2022-04-27
Payer: COMMERCIAL

## 2022-04-27 ENCOUNTER — PATIENT MESSAGE (OUTPATIENT)
Dept: PODIATRY | Facility: CLINIC | Age: 52
End: 2022-04-27
Payer: COMMERCIAL

## 2022-04-27 DIAGNOSIS — E11.51 TYPE II DIABETES MELLITUS WITH PERIPHERAL CIRCULATORY DISORDER: ICD-10-CM

## 2022-04-27 DIAGNOSIS — Z87.2 HEALED ULCER OF RIGHT FOOT: ICD-10-CM

## 2022-04-27 DIAGNOSIS — E11.49 TYPE II DIABETES MELLITUS WITH NEUROLOGICAL MANIFESTATIONS: Primary | ICD-10-CM

## 2022-04-27 PROCEDURE — 99212 PR OFFICE/OUTPT VISIT, EST, LEVL II, 10-19 MIN: ICD-10-PCS | Mod: S$GLB,,, | Performed by: PODIATRIST

## 2022-04-27 PROCEDURE — 99999 PR PBB SHADOW E&M-EST. PATIENT-LVL III: CPT | Mod: PBBFAC,,, | Performed by: PODIATRIST

## 2022-04-27 PROCEDURE — 99212 OFFICE O/P EST SF 10 MIN: CPT | Mod: S$GLB,,, | Performed by: PODIATRIST

## 2022-04-27 PROCEDURE — 99999 PR PBB SHADOW E&M-EST. PATIENT-LVL III: ICD-10-PCS | Mod: PBBFAC,,, | Performed by: PODIATRIST

## 2022-04-27 PROCEDURE — 1159F PR MEDICATION LIST DOCUMENTED IN MEDICAL RECORD: ICD-10-PCS | Mod: CPTII,S$GLB,, | Performed by: PODIATRIST

## 2022-04-27 PROCEDURE — 1160F RVW MEDS BY RX/DR IN RCRD: CPT | Mod: CPTII,S$GLB,, | Performed by: PODIATRIST

## 2022-04-27 PROCEDURE — 1159F MED LIST DOCD IN RCRD: CPT | Mod: CPTII,S$GLB,, | Performed by: PODIATRIST

## 2022-04-27 PROCEDURE — 1160F PR REVIEW ALL MEDS BY PRESCRIBER/CLIN PHARMACIST DOCUMENTED: ICD-10-PCS | Mod: CPTII,S$GLB,, | Performed by: PODIATRIST

## 2022-04-27 RX ORDER — GABAPENTIN 300 MG/1
600 CAPSULE ORAL 2 TIMES DAILY
Qty: 360 CAPSULE | Refills: 3 | Status: SHIPPED | OUTPATIENT
Start: 2022-04-27

## 2022-04-27 NOTE — PROGRESS NOTES
Subjective:      Patient ID: Dre Short is a 52 y.o. male.    Chief Complaint: Diabetes Mellitus and Follow-up    Dre is a 52 y.o. male     4/21/22: Patient returns for follow up ulcer treatment ambulating in darco and football no new complaints    4/27/22: Patient returns for follow up ulcer treatment right great toe ambulating in darco and football no new complaints.    PCP: Scott Peñaloza MD    Date Last Seen by PCP: 7/6/2021    Current shoe gear: Casual shoes    Hemoglobin A1C   Date Value Ref Range Status   11/24/2020 10.9 (H) <5.7 % of total Hgb Final     Comment:     For someone without known diabetes, a hemoglobin A1c  value of 6.5% or greater indicates that they may have   diabetes and this should be confirmed with a follow-up   test.     For someone with known diabetes, a value <7% indicates   that their diabetes is well controlled and a value   greater than or equal to 7% indicates suboptimal   control. A1c targets should be individualized based on   duration of diabetes, age, comorbid conditions, and   other considerations.     Currently, no consensus exists regarding use of  hemoglobin A1c for diagnosis of diabetes for children.         04/16/2019 9.5 (H) <5.7 % of total Hgb Final     Comment:     For someone without known diabetes, a hemoglobin A1c  value of 6.5% or greater indicates that they may have   diabetes and this should be confirmed with a follow-up   test.     For someone with known diabetes, a value <7% indicates   that their diabetes is well controlled and a value   greater than or equal to 7% indicates suboptimal   control. A1c targets should be individualized based on   duration of diabetes, age, comorbid conditions, and   other considerations.     Currently, no consensus exists regarding use of  hemoglobin A1c for diagnosis of diabetes for children.         11/03/2017 10.7 (H) 4.0 - 5.6 % Final     Comment:     According to ADA guidelines, hemoglobin A1c <7.0% represents  optimal  control in non-pregnant diabetic patients. Different  metrics may apply to specific patient populations.   Standards of Medical Care in Diabetes-2016.  For the purpose of screening for the presence of diabetes:  <5.7%     Consistent with the absence of diabetes  5.7-6.4%  Consistent with increasing risk for diabetes   (prediabetes)  >or=6.5%  Consistent with diabetes  Currently, no consensus exists for use of hemoglobin A1c  for diagnosis of diabetes for children.  This Hemoglobin A1c assay has significant interference with fetal   hemoglobin   (HbF). The results are invalid for patients with abnormal amounts of   HbF,   including those with known Hereditary Persistence   of Fetal Hemoglobin. Heterozygous hemoglobin variants (HbAS, HbAC,   HbAD, HbAE, HbA2) do not significantly interfere with this assay;   however, presence of multiple variants in a sample may impact the %   interference.             Review of Systems   Constitutional: Negative for chills and fever.   Cardiovascular: Positive for leg swelling. Negative for claudication.   Respiratory: Negative for shortness of breath.    Skin: Positive for nail changes. Negative for itching and rash.   Musculoskeletal: Positive for arthritis. Negative for muscle cramps, muscle weakness and myalgias.   Gastrointestinal: Negative for nausea and vomiting.   Neurological: Positive for numbness and paresthesias. Negative for focal weakness and loss of balance.           Objective:      Physical Exam  Constitutional:       General: He is not in acute distress.     Appearance: He is well-developed. He is not diaphoretic.   Cardiovascular:      Pulses:           Dorsalis pedis pulses are 2+ on the right side and 2+ on the left side.        Posterior tibial pulses are 2+ on the right side and 2+ on the left side.      Comments: < 3 sec capillary refill time to toes 1-5 bilateral. Feet are warm to touch proximally with distal cooling b/l. There is no hair growth on the feet  and toes b/l. There is moderate edema b/l. No spider veins or varicosities present b/l.     Musculoskeletal:      Comments: Resolved discomfort to palpation at the plantar metatarsal heads area.    Equinus noted b/l ankles with < 10 deg DF noted. MMT 5/5 in DF/PF/Inv/Ev resistance with no reproduction of pain in any direction. Passive range of motion of ankle and pedal joints is painless b/l.     Skin:     General: Skin is warm and dry.      Coloration: Skin is not pale.      Findings: Lesion present. No abrasion, bruising, burn, ecchymosis, erythema, laceration, petechiae or rash.      Nails: There is no clubbing.      Comments: Skin temperature, texture and turgor within normal limits.'    Ulcer Location: Plantar right great toe  Measurements: 0 x 0 x 0   Periwound: Intact  Drainage: None.  Pus: None.  Malodor: None.  Base:  100% epithelial tissue.  Signs of infection: None.           Neurological:      Mental Status: He is alert and oriented to person, place, and time.      Sensory: Sensory deficit present.      Motor: No tremor, atrophy or abnormal muscle tone.      Comments: Negative tinel sign bilateral. Diminished vibratory and protective sensation bilateral.   Psychiatric:         Behavior: Behavior normal.               Assessment:       Encounter Diagnoses   Name Primary?    Type II diabetes mellitus with neurological manifestations Yes    Healed ulcer of right foot     Type II diabetes mellitus with peripheral circulatory disorder          Plan:       Dre was seen today for diabetes mellitus and follow-up.    Diagnoses and all orders for this visit:    Type II diabetes mellitus with neurological manifestations  -     DIABETIC SHOES FOR HOME USE  -     gabapentin (NEURONTIN) 300 MG capsule; Take 2 capsules (600 mg total) by mouth 2 (two) times daily.    Healed ulcer of right foot  -     DIABETIC SHOES FOR HOME USE    Type II diabetes mellitus with peripheral circulatory disorder  -     DIABETIC SHOES  FOR HOME USE      I counseled the patient on his conditions, their implications and medical management.    Shoe inspection. Diabetic Foot Education. Patient reminded of the importance of good nutrition and blood sugar control to help prevent podiatric complications of diabetes. Patient instructed on proper foot hygeine. We discussed wearing proper shoe gear, daily foot inspections, never walking without protective shoe gear, never putting sharp instruments to feet    Wound healed transition to supportive shoes, will begin getting his diabetic shoes with prescription dispensed today    Adequate vitamin supplementation, protein intake, and hydration - discussed with patient.    Return 1 month for follow up    Alfredo Shirley DPM